# Patient Record
Sex: FEMALE | Race: WHITE | NOT HISPANIC OR LATINO | Employment: FULL TIME | URBAN - NONMETROPOLITAN AREA
[De-identification: names, ages, dates, MRNs, and addresses within clinical notes are randomized per-mention and may not be internally consistent; named-entity substitution may affect disease eponyms.]

---

## 2023-03-19 PROBLEM — R05.8 COUGH WITH SPUTUM: Status: ACTIVE | Noted: 2023-03-19

## 2023-03-19 PROBLEM — J45.909 ASTHMA (HHS-HCC): Status: ACTIVE | Noted: 2023-03-19

## 2023-03-19 PROBLEM — I82.409 DVT (DEEP VENOUS THROMBOSIS) (MULTI): Status: ACTIVE | Noted: 2023-03-19

## 2023-03-19 PROBLEM — D68.69 SECONDARY HYPERCOAGULABLE STATE (MULTI): Status: ACTIVE | Noted: 2023-03-19

## 2023-03-19 PROBLEM — S25.409A: Status: ACTIVE | Noted: 2023-03-19

## 2023-03-19 PROBLEM — K60.2 ANAL FISSURE: Status: ACTIVE | Noted: 2023-03-19

## 2023-03-19 PROBLEM — I26.99 PULMONARY EMBOLISM (MULTI): Status: ACTIVE | Noted: 2023-03-19

## 2023-03-19 PROBLEM — G43.109 COMPLICATED MIGRAINE: Status: ACTIVE | Noted: 2023-03-19

## 2023-03-19 PROBLEM — K62.5 BRBPR (BRIGHT RED BLOOD PER RECTUM): Status: ACTIVE | Noted: 2023-03-19

## 2023-03-19 PROBLEM — M79.609 LIMB PAIN: Status: ACTIVE | Noted: 2023-03-19

## 2023-03-19 PROBLEM — M25.519 SHOULDER PAIN: Status: ACTIVE | Noted: 2023-03-19

## 2023-03-19 PROBLEM — E66.9 OBESITY: Status: ACTIVE | Noted: 2023-03-19

## 2023-03-19 PROBLEM — I27.24 CTEPH (CHRONIC THROMBOEMBOLIC PULMONARY HYPERTENSION) (MULTI): Status: ACTIVE | Noted: 2023-03-19

## 2023-03-19 PROBLEM — J30.2 SEASONAL ALLERGIC REACTION: Status: ACTIVE | Noted: 2023-03-19

## 2023-03-19 PROBLEM — G70.00 MYASTHENIA GRAVIS, ACETYLCHOLINE RECEPTOR ANTIBODY POSITIVE (MULTI): Status: ACTIVE | Noted: 2023-03-19

## 2023-03-19 PROBLEM — R06.02 EXERTIONAL SHORTNESS OF BREATH: Status: ACTIVE | Noted: 2023-03-19

## 2023-03-19 PROBLEM — J06.9 URI (UPPER RESPIRATORY INFECTION): Status: ACTIVE | Noted: 2023-03-19

## 2023-03-19 RX ORDER — MYCOPHENOLATE MOFETIL 500 MG/1
500 TABLET ORAL 2 TIMES DAILY
COMMUNITY

## 2023-03-19 RX ORDER — TADALAFIL 20 MG/1
2 TABLET ORAL DAILY
COMMUNITY
Start: 2019-06-01 | End: 2024-03-13

## 2023-03-19 RX ORDER — BENZONATATE 100 MG/1
100 CAPSULE ORAL 3 TIMES DAILY PRN
COMMUNITY
End: 2023-03-24 | Stop reason: ALTCHOICE

## 2023-03-19 RX ORDER — DOCUSATE SODIUM 100 MG/1
1 CAPSULE, LIQUID FILLED ORAL 2 TIMES DAILY
Status: ON HOLD | COMMUNITY
End: 2024-03-28 | Stop reason: WASHOUT

## 2023-03-19 RX ORDER — FLUTICASONE PROPIONATE 50 MCG
1 SPRAY, SUSPENSION (ML) NASAL ONCE AS NEEDED
COMMUNITY
Start: 2014-09-22

## 2023-03-19 RX ORDER — MONTELUKAST SODIUM 10 MG/1
10 TABLET ORAL NIGHTLY
COMMUNITY

## 2023-03-19 RX ORDER — BUDESONIDE AND FORMOTEROL FUMARATE DIHYDRATE 80; 4.5 UG/1; UG/1
AEROSOL RESPIRATORY (INHALATION)
COMMUNITY
Start: 2022-04-27 | End: 2023-12-21 | Stop reason: ALTCHOICE

## 2023-03-19 RX ORDER — PYRIDOSTIGMINE BROMIDE 60 MG/1
2 TABLET ORAL DAILY
COMMUNITY
Start: 2013-01-03

## 2023-03-19 RX ORDER — LORATADINE 10 MG/1
1 TABLET ORAL DAILY
COMMUNITY
Start: 2014-07-15 | End: 2023-11-08 | Stop reason: ALTCHOICE

## 2023-03-19 RX ORDER — ACETAMINOPHEN 325 MG/1
TABLET ORAL
COMMUNITY

## 2023-03-19 RX ORDER — MACITENTAN 10 MG/1
1 TABLET, FILM COATED ORAL DAILY
COMMUNITY
Start: 2019-11-30

## 2023-03-19 RX ORDER — ALBUTEROL SULFATE 90 UG/1
1 AEROSOL, METERED RESPIRATORY (INHALATION) EVERY 4 HOURS PRN
COMMUNITY
End: 2023-12-30

## 2023-03-24 ENCOUNTER — OFFICE VISIT (OUTPATIENT)
Dept: PRIMARY CARE | Facility: CLINIC | Age: 38
End: 2023-03-24
Payer: COMMERCIAL

## 2023-03-24 VITALS
HEIGHT: 63 IN | DIASTOLIC BLOOD PRESSURE: 79 MMHG | WEIGHT: 211 LBS | HEART RATE: 77 BPM | SYSTOLIC BLOOD PRESSURE: 128 MMHG | BODY MASS INDEX: 37.39 KG/M2

## 2023-03-24 DIAGNOSIS — I27.82 CHRONIC PULMONARY EMBOLISM, UNSPECIFIED PULMONARY EMBOLISM TYPE, UNSPECIFIED WHETHER ACUTE COR PULMONALE PRESENT (MULTI): ICD-10-CM

## 2023-03-24 DIAGNOSIS — J30.2 SEASONAL ALLERGIC REACTION: ICD-10-CM

## 2023-03-24 DIAGNOSIS — G43.109 COMPLICATED MIGRAINE: Primary | ICD-10-CM

## 2023-03-24 DIAGNOSIS — G70.00 MYASTHENIA GRAVIS, ACETYLCHOLINE RECEPTOR ANTIBODY POSITIVE (MULTI): ICD-10-CM

## 2023-03-24 DIAGNOSIS — G43.709 CHRONIC MIGRAINE WITHOUT AURA WITHOUT STATUS MIGRAINOSUS, NOT INTRACTABLE: ICD-10-CM

## 2023-03-24 DIAGNOSIS — I27.24 CTEPH (CHRONIC THROMBOEMBOLIC PULMONARY HYPERTENSION) (MULTI): ICD-10-CM

## 2023-03-24 DIAGNOSIS — L23.9 ALLERGIC ECZEMA: ICD-10-CM

## 2023-03-24 PROBLEM — H52.03 HYPEROPIA OF BOTH EYES: Status: RESOLVED | Noted: 2021-12-30 | Resolved: 2023-03-24

## 2023-03-24 PROBLEM — H43.813 POSTERIOR VITREOUS DETACHMENT OF BOTH EYES: Status: RESOLVED | Noted: 2019-09-17 | Resolved: 2023-03-24

## 2023-03-24 PROBLEM — F41.9 ANXIETY DISORDER: Status: ACTIVE | Noted: 2020-04-29

## 2023-03-24 PROBLEM — H43.393 VITREOUS FLOATER, BILATERAL: Status: RESOLVED | Noted: 2019-09-17 | Resolved: 2023-03-24

## 2023-03-24 PROCEDURE — 99214 OFFICE O/P EST MOD 30 MIN: CPT | Performed by: INTERNAL MEDICINE

## 2023-03-24 PROCEDURE — 1036F TOBACCO NON-USER: CPT | Performed by: INTERNAL MEDICINE

## 2023-03-24 RX ORDER — MINERAL OIL
180 ENEMA (ML) RECTAL DAILY
Qty: 30 TABLET | Refills: 5 | Status: SHIPPED | OUTPATIENT
Start: 2023-03-24 | End: 2023-11-08

## 2023-03-24 RX ORDER — MULTIVITAMIN
2 TABLET ORAL
COMMUNITY

## 2023-03-24 RX ORDER — BETAMETHASONE DIPROPIONATE 0.5 MG/G
CREAM TOPICAL 2 TIMES DAILY PRN
Qty: 30 G | Refills: 1 | Status: SHIPPED | OUTPATIENT
Start: 2023-03-24 | End: 2023-07-22

## 2023-03-24 ASSESSMENT — PATIENT HEALTH QUESTIONNAIRE - PHQ9
2. FEELING DOWN, DEPRESSED OR HOPELESS: NOT AT ALL
1. LITTLE INTEREST OR PLEASURE IN DOING THINGS: NOT AT ALL
SUM OF ALL RESPONSES TO PHQ9 QUESTIONS 1 AND 2: 0

## 2023-03-24 ASSESSMENT — ENCOUNTER SYMPTOMS
SINUS PAIN: 1
SINUS PRESSURE: 1
HEADACHES: 1
DIZZINESS: 1
NUMBNESS: 1

## 2023-03-24 NOTE — PROGRESS NOTES
"Subjective   Patient ID: Alda Chaney is a 38 y.o. female who presents for Migraine (Pt states she has had them for a long time/Getting worse and coming on more frequent), Allergies (Seasonal/Having a hard time with her lungs this year), and Rash (Back of her RT leg/Itchy/tender/Noticed a week ago).    Migraine   Associated symptoms include dizziness, numbness and sinus pressure.   Rash  Associated symptoms include congestion.     Patient reports that for  the last few months her migraines have been getting worse, they will affect her eye sight, everything will get blurry. No aura. Sometimes her lips will feel numb.   Usually getting it twice a week, often while driving but not necessarily because the sun is bright.   She had an eye exam in Jan, rx did not change significnatly.   She repots that it is throbbing off to the left side of her head.  She reports  that the stabbing sensation in the top of her head is new through she has had migraines in the past     Allergies dod not seem maribell controlled on current regiment, singualir at night, loratadine during the day, and flonase.     Rash on back of right knee, has been there for 1 week, now it is itchy.   Review of Systems   HENT:  Positive for congestion, sinus pressure and sinus pain.    Skin:  Positive for rash.   Neurological:  Positive for dizziness, numbness and headaches.       Objective   /79 (BP Location: Left arm, Patient Position: Sitting, BP Cuff Size: Adult)   Pulse 77   Ht 1.6 m (5' 3\")   Wt 95.7 kg (211 lb)   BMI 37.38 kg/m²     Physical Exam  Constitutional:       General: She is not in acute distress.     Appearance: Normal appearance.   HENT:      Head: Normocephalic.      Nose: Nose normal.      Mouth/Throat:      Mouth: Mucous membranes are dry.      Pharynx: No oropharyngeal exudate.   Eyes:      General:         Right eye: No discharge.         Left eye: No discharge.      Extraocular Movements: Extraocular movements intact.      " Pupils: Pupils are equal, round, and reactive to light.   Cardiovascular:      Rate and Rhythm: Normal rate and regular rhythm.      Heart sounds: No murmur heard.     No gallop.   Pulmonary:      Effort: Pulmonary effort is normal. No respiratory distress.      Breath sounds: Normal breath sounds. No wheezing.   Musculoskeletal:         General: No swelling. Normal range of motion.   Skin:     General: Skin is warm and dry.      Coloration: Skin is not jaundiced.      Comments: What appears to be nummular excema on posterior right knee, around 3 cm   Neurological:      General: No focal deficit present.      Mental Status: She is alert and oriented to person, place, and time.      Cranial Nerves: No cranial nerve deficit.   Psychiatric:         Mood and Affect: Mood normal.         Behavior: Behavior normal.           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)     - PE with chronic TEPH  - follows with pulm  - on xarelto   - on uptravi  - on opsumit         Pulmonary embolism (CMS/HCC)       Other    Complicated migraine     - change in headache pattern with new sharp stabbing pain at apex of head off to the left, will order brain mri wo contrast   - rx nurtec   - cannot have mg due to Myasthenia gravis   - recommend riboflavin  - had eye exam last in Jan 23         Seasonal allergic reaction     - will try changing cetirizine to allegra           Other Visit Diagnoses       Allergic eczema    -  Primary    Relevant Medications    fexofenadine (Allegra) 180 mg tablet    betamethasone dipropionate 0.05 % cream    Chronic migraine without aura without status migrainosus, not intractable        Relevant Medications    rimegepant (Nurtec ODT) 75 mg  tablet,disintegrating    Other Relevant Orders    MR brain intracranial wo IV contrast            Final diagnoses:   [L23.9] Allergic eczema   [G43.709] Chronic migraine without aura without status migrainosus, not intractable   [G70.00] Myasthenia gravis, acetylcholine receptor antibody positive (CMS/HCC)   [I27.24] CTEPH (chronic thromboembolic pulmonary hypertension) (CMS/[facility])   [I27.82] Chronic pulmonary embolism, unspecified pulmonary embolism type, unspecified whether acute cor pulmonale present (CMS/[facility])   [G43.109] Complicated migraine   [J30.2] Seasonal allergic reaction

## 2023-03-24 NOTE — ASSESSMENT & PLAN NOTE
- change in headache pattern with new sharp stabbing pain at apex of head off to the left, will order brain mri wo contrast   - rx nurtec   - cannot have mg due to Myasthenia gravis   - recommend riboflavin  - had eye exam last in Jan 23

## 2023-04-03 ENCOUNTER — APPOINTMENT (OUTPATIENT)
Dept: LAB | Facility: LAB | Age: 38
End: 2023-04-03
Payer: COMMERCIAL

## 2023-04-03 LAB
ALANINE AMINOTRANSFERASE (SGPT) (U/L) IN SER/PLAS: 14 U/L (ref 7–45)
ALBUMIN (G/DL) IN SER/PLAS: 4.1 G/DL (ref 3.4–5)
ALKALINE PHOSPHATASE (U/L) IN SER/PLAS: 82 U/L (ref 33–110)
ANION GAP IN SER/PLAS: 12 MMOL/L (ref 10–20)
ASPARTATE AMINOTRANSFERASE (SGOT) (U/L) IN SER/PLAS: 13 U/L (ref 9–39)
BASOPHILS (10*3/UL) IN BLOOD BY AUTOMATED COUNT: 0.04 X10E9/L (ref 0–0.1)
BASOPHILS/100 LEUKOCYTES IN BLOOD BY AUTOMATED COUNT: 0.6 % (ref 0–2)
BILIRUBIN TOTAL (MG/DL) IN SER/PLAS: 0.4 MG/DL (ref 0–1.2)
CALCIUM (MG/DL) IN SER/PLAS: 9.4 MG/DL (ref 8.6–10.6)
CARBON DIOXIDE, TOTAL (MMOL/L) IN SER/PLAS: 27 MMOL/L (ref 21–32)
CHLORIDE (MMOL/L) IN SER/PLAS: 105 MMOL/L (ref 98–107)
CREATININE (MG/DL) IN SER/PLAS: 0.63 MG/DL (ref 0.5–1.05)
EOSINOPHILS (10*3/UL) IN BLOOD BY AUTOMATED COUNT: 0.22 X10E9/L (ref 0–0.7)
EOSINOPHILS/100 LEUKOCYTES IN BLOOD BY AUTOMATED COUNT: 3 % (ref 0–6)
ERYTHROCYTE DISTRIBUTION WIDTH (RATIO) BY AUTOMATED COUNT: 13.3 % (ref 11.5–14.5)
ERYTHROCYTE MEAN CORPUSCULAR HEMOGLOBIN CONCENTRATION (G/DL) BY AUTOMATED: 32.2 G/DL (ref 32–36)
ERYTHROCYTE MEAN CORPUSCULAR VOLUME (FL) BY AUTOMATED COUNT: 93 FL (ref 80–100)
ERYTHROCYTES (10*6/UL) IN BLOOD BY AUTOMATED COUNT: 4.7 X10E12/L (ref 4–5.2)
GFR FEMALE: >90 ML/MIN/1.73M2
GLUCOSE (MG/DL) IN SER/PLAS: 90 MG/DL (ref 74–99)
HCG, URINE: NEGATIVE
HEMATOCRIT (%) IN BLOOD BY AUTOMATED COUNT: 43.8 % (ref 36–46)
HEMOGLOBIN (G/DL) IN BLOOD: 14.1 G/DL (ref 12–16)
IMMATURE GRANULOCYTES/100 LEUKOCYTES IN BLOOD BY AUTOMATED COUNT: 0.4 % (ref 0–0.9)
LEUKOCYTES (10*3/UL) IN BLOOD BY AUTOMATED COUNT: 7.3 X10E9/L (ref 4.4–11.3)
LYMPHOCYTES (10*3/UL) IN BLOOD BY AUTOMATED COUNT: 1.68 X10E9/L (ref 1.2–4.8)
LYMPHOCYTES/100 LEUKOCYTES IN BLOOD BY AUTOMATED COUNT: 23.1 % (ref 13–44)
MONOCYTES (10*3/UL) IN BLOOD BY AUTOMATED COUNT: 0.45 X10E9/L (ref 0.1–1)
MONOCYTES/100 LEUKOCYTES IN BLOOD BY AUTOMATED COUNT: 6.2 % (ref 2–10)
NATRIURETIC PEPTIDE B (PG/ML) IN SER/PLAS: 11 PG/ML (ref 0–99)
NEUTROPHILS (10*3/UL) IN BLOOD BY AUTOMATED COUNT: 4.85 X10E9/L (ref 1.2–7.7)
NEUTROPHILS/100 LEUKOCYTES IN BLOOD BY AUTOMATED COUNT: 66.7 % (ref 40–80)
NRBC (PER 100 WBCS) BY AUTOMATED COUNT: 0 /100 WBC (ref 0–0)
PLATELETS (10*3/UL) IN BLOOD AUTOMATED COUNT: 289 X10E9/L (ref 150–450)
POTASSIUM (MMOL/L) IN SER/PLAS: 4.2 MMOL/L (ref 3.5–5.3)
PROTEIN TOTAL: 6.8 G/DL (ref 6.4–8.2)
SODIUM (MMOL/L) IN SER/PLAS: 140 MMOL/L (ref 136–145)
UREA NITROGEN (MG/DL) IN SER/PLAS: 14 MG/DL (ref 6–23)

## 2023-04-25 ENCOUNTER — APPOINTMENT (OUTPATIENT)
Dept: PRIMARY CARE | Facility: CLINIC | Age: 38
End: 2023-04-25
Payer: COMMERCIAL

## 2023-04-26 ENCOUNTER — HOSPITAL ENCOUNTER (OUTPATIENT)
Dept: DATA CONVERSION | Facility: HOSPITAL | Age: 38
End: 2023-04-26
Attending: SURGERY | Admitting: SURGERY
Payer: COMMERCIAL

## 2023-04-26 DIAGNOSIS — R06.09 OTHER FORMS OF DYSPNEA: ICD-10-CM

## 2023-04-26 DIAGNOSIS — G70.00 MYASTHENIA GRAVIS WITHOUT (ACUTE) EXACERBATION (MULTI): ICD-10-CM

## 2023-04-26 DIAGNOSIS — Z99.81 DEPENDENCE ON SUPPLEMENTAL OXYGEN: ICD-10-CM

## 2023-04-26 DIAGNOSIS — E66.9 OBESITY, UNSPECIFIED: ICD-10-CM

## 2023-04-26 DIAGNOSIS — G43.909 MIGRAINE, UNSPECIFIED, NOT INTRACTABLE, WITHOUT STATUS MIGRAINOSUS: ICD-10-CM

## 2023-04-26 DIAGNOSIS — K64.8 OTHER HEMORRHOIDS: ICD-10-CM

## 2023-04-26 DIAGNOSIS — I27.24 CHRONIC THROMBOEMBOLIC PULMONARY HYPERTENSION (MULTI): ICD-10-CM

## 2023-04-26 DIAGNOSIS — Z86.718 PERSONAL HISTORY OF OTHER VENOUS THROMBOSIS AND EMBOLISM: ICD-10-CM

## 2023-04-26 DIAGNOSIS — J45.909 UNSPECIFIED ASTHMA, UNCOMPLICATED (HHS-HCC): ICD-10-CM

## 2023-04-26 DIAGNOSIS — K62.5 HEMORRHAGE OF ANUS AND RECTUM: ICD-10-CM

## 2023-04-26 DIAGNOSIS — I27.21 SECONDARY PULMONARY ARTERIAL HYPERTENSION (MULTI): ICD-10-CM

## 2023-04-26 DIAGNOSIS — Z79.01 LONG TERM (CURRENT) USE OF ANTICOAGULANTS: ICD-10-CM

## 2023-04-26 DIAGNOSIS — K64.4 RESIDUAL HEMORRHOIDAL SKIN TAGS: ICD-10-CM

## 2023-04-26 DIAGNOSIS — K57.30 DIVERTICULOSIS OF LARGE INTESTINE WITHOUT PERFORATION OR ABSCESS WITHOUT BLEEDING: ICD-10-CM

## 2023-04-26 DIAGNOSIS — Z86.711 PERSONAL HISTORY OF PULMONARY EMBOLISM: ICD-10-CM

## 2023-05-05 ENCOUNTER — OFFICE VISIT (OUTPATIENT)
Dept: PRIMARY CARE | Facility: CLINIC | Age: 38
End: 2023-05-05
Payer: COMMERCIAL

## 2023-05-05 VITALS
WEIGHT: 214 LBS | SYSTOLIC BLOOD PRESSURE: 127 MMHG | BODY MASS INDEX: 37.92 KG/M2 | HEIGHT: 63 IN | DIASTOLIC BLOOD PRESSURE: 75 MMHG | HEART RATE: 88 BPM

## 2023-05-05 DIAGNOSIS — G43.109 COMPLICATED MIGRAINE: Primary | ICD-10-CM

## 2023-05-05 DIAGNOSIS — G43.709 CHRONIC MIGRAINE WITHOUT AURA WITHOUT STATUS MIGRAINOSUS, NOT INTRACTABLE: ICD-10-CM

## 2023-05-05 LAB — HCG, URINE: NEGATIVE

## 2023-05-05 PROCEDURE — 1036F TOBACCO NON-USER: CPT | Performed by: INTERNAL MEDICINE

## 2023-05-05 PROCEDURE — 3008F BODY MASS INDEX DOCD: CPT | Performed by: INTERNAL MEDICINE

## 2023-05-05 PROCEDURE — 99214 OFFICE O/P EST MOD 30 MIN: CPT | Performed by: INTERNAL MEDICINE

## 2023-05-05 ASSESSMENT — PATIENT HEALTH QUESTIONNAIRE - PHQ9
SUM OF ALL RESPONSES TO PHQ9 QUESTIONS 1 AND 2: 0
2. FEELING DOWN, DEPRESSED OR HOPELESS: NOT AT ALL
1. LITTLE INTEREST OR PLEASURE IN DOING THINGS: NOT AT ALL

## 2023-05-05 NOTE — PROGRESS NOTES
"Subjective   Patient ID: Alda Carvalho is a 38 y.o. female who presents for Follow-up (1 month (headaches and allergies) ).  HPI  Patient is here today for 1 mo follow up on migraines.  Reports since starting the Allegra her allergies have been improved.  Insurance not giving her enough to do nurtec every other day for preventative indication, will change directions, was having migraine nearly daily, > 15 per month, sig improved since starting medication,.     Review of Systems    Objective   /75 (BP Location: Right arm, Patient Position: Sitting, BP Cuff Size: Adult)   Pulse 88   Ht 1.6 m (5' 3\")   Wt 97.1 kg (214 lb)   BMI 37.91 kg/m²     Physical Exam    Narrative & Impression   Interpreted By:  JOSEP MCMANUS DO  MRN: [id number]  Patient Name: ALDA CARVALHO     STUDY:  MRI BRAIN WO;  3/29/2023 8:03 am     INDICATION:  change in migraines.     COMPARISON:  April 16, 2014     ACCESSION NUMBER(S):  [id number]     ORDERING CLINICIAN:  BAKARI VELAZQUEZ     TECHNIQUE:  Axial T2, FLAIR, DWI, gradient echo T2 and sagittal and coronal T1  weighted images of brain were acquired.     FINDINGS:  Some of the images are degraded by patient motion.     CSF Spaces: The ventricular system is nondilated. There is again  prominence of the retrocerebellar CSF space which may be related to  an arachnoid cyst measuring 1.8 cm in AP dimension by 1.9 cm in  transverse dimension, not measurably changed from the prior study.     Parenchyma: There is no diffusion restriction abnormality to suggest  acute infarct.  There is no focal parenchymal signal abnormality.  There is no mass effect or midline shift.     Paranasal Sinuses and Mastoids: Visualized paranasal sinuses and  mastoid air cells are unremarkable.     IMPRESSION:  No evidence of acute infarct, intracranial mass effect or midline  shift.     Assessment/Plan   Problem List Items Addressed This Visit          Other    Complicated migraine - Primary     Other Visit " Diagnoses       Chronic migraine without aura without status migrainosus, not intractable        Relevant Medications    rimegepant (Nurtec ODT) 75 mg tablet,disintegrating          Chronic migraine   - I have personally reviewed pts brain MRI, within normal limtis   - has not had any headaches since starting nurtec   - will change directions to 1 every other day for preventative indication   - cannot have mg due to Myasthenia gravis   - follow up in 6 mo sooner if anything changes     Final diagnoses:   [G43.709] Chronic migraine without aura without status migrainosus, not intractable   [G43.109] Complicated migraine

## 2023-07-19 ENCOUNTER — TELEPHONE (OUTPATIENT)
Dept: PRIMARY CARE | Facility: CLINIC | Age: 38
End: 2023-07-19
Payer: COMMERCIAL

## 2023-07-19 DIAGNOSIS — M25.571 ACUTE RIGHT ANKLE PAIN: Primary | ICD-10-CM

## 2023-07-19 LAB — HCG, URINE: NEGATIVE

## 2023-07-19 NOTE — TELEPHONE ENCOUNTER
Pt states she twisted her Right ankle a week ago and still hurting a lot, wanted to know if you would order an xray for her

## 2023-09-07 VITALS — HEIGHT: 63 IN | WEIGHT: 212.52 LBS | BODY MASS INDEX: 37.66 KG/M2

## 2023-09-14 NOTE — H&P
History of Present Illness:   Pregnant/Lactating:  ·  Are You Pregnant no   ·  Are You Currently Breastfeeding no     History Present Illness:  Reason for surgery: BRBPR   HPI:    Ms. Chaney is a 37-year-old female who I previously evaluated 2 months ago for an anal fissure. At that time, she was having sharp pain. That sharp  severe pain has subsided. She still gets some minor discomfort with bowel movements, but this is much improved. She has been taking Colace twice daily. She has bowel movements every other day. These are soft and do not require straining. Since starting  the Colace, she has not seen any blood per rectum. She takes Xarelto for history of DVT and PE, she has a clotting disorder. She has required a Lovenox bridge in the past when these are held for procedures. She had a colonoscopy over 10 years ago that  showed hemorrhoids, but no other abnormalities. She has no family history of colon or rectal cancer.        Allergies:        Allergies:  ·  Levaquin : Other  ·  magnesium  sulfate: Resp Distress  ·  Bee  Stings: Anaphylaxis  ·  magnesium  chloride: Unknown  ·  Cipro : Unknown    Home Medication Review:   Home Medications Reviewed: yes     Impression/Procedure:   ·  Impression and Planned Procedure: Ms. Chaney is a 37-year-old female with an anal fissure. This is healing, but not fully healed. Therefore, we will delay colonoscopy a little longer. We are doing  the colonoscopy because she had seen bright red blood per rectum and it has been over 10 years since her previous scope. We discussed risks and benefit of diagnostic colonoscopy. This included risk of bleeding, perforation, missed polyps, potential need  for additional procedures pending findings, and potential recurrence of her anal fissure. She was agreeable to proceed. Bowel prep instructions were reviewed with the patient and all of her questions were answered. We will do this at the hospital with  the assistance of anesthesia  given her history of pulmonary arterial hypertension. She reports that when she had Versed in the past she had respiratory issues that required reversal of the Versed. She will also need to hold her Xarelto for this procedure,  and will require a Lovenox bridge. She is scheduled for diagnostic colonoscopy on 4/26/23. In the meantime, she will continue Colace twice daily and she will notify the office if her fissure does not continue to improved prior to the procedure in which  case we would bump it back.       ERAS (Enhanced Recovery After Surgery):  ·  ERAS Patient: no       Physical Exam by System:    Constitutional: No acute distress, conversant and  pleasant   Eyes: PERRL   ENMT: mucous membranes moist   Head/Neck: Grossly normal.   Respiratory/Thorax: No labored breathing   Cardiovascular: NSR   Gastrointestinal: Abdomen soft, obese but nondistended   Extremities: normal extremities, no edema   Neurological: alert and oriented x3   Lymphatic: No significant lymphadenopathy   Psychological: Appropriate mood and behavior   Skin: Warm and dry     Consent:   COVID-19 Consent:  ·  COVID-19 Risk Consent Surgeon has reviewed key risks related to the risk of alena COVID-19 and if they contract COVID-19 what the risks are.       Electronic Signatures:  Milagros Almaraz)  (Signed 26-Apr-2023 07:06)   Authored: History of Present Illness, Allergies, Home  Medication Review, Impression/Procedure, ERAS, Physical Exam, Consent, Note Completion      Last Updated: 26-Apr-2023 07:06 by Milagros Almaraz)

## 2023-09-25 ENCOUNTER — LAB (OUTPATIENT)
Dept: LAB | Facility: LAB | Age: 38
End: 2023-09-25
Payer: COMMERCIAL

## 2023-09-25 LAB
ALANINE AMINOTRANSFERASE (SGPT) (U/L) IN SER/PLAS: 25 U/L (ref 7–45)
ALBUMIN (G/DL) IN SER/PLAS: 4.1 G/DL (ref 3.4–5)
ALKALINE PHOSPHATASE (U/L) IN SER/PLAS: 79 U/L (ref 33–110)
ANION GAP IN SER/PLAS: 16 MMOL/L (ref 10–20)
ASPARTATE AMINOTRANSFERASE (SGOT) (U/L) IN SER/PLAS: 24 U/L (ref 9–39)
BILIRUBIN TOTAL (MG/DL) IN SER/PLAS: 0.5 MG/DL (ref 0–1.2)
CALCIUM (MG/DL) IN SER/PLAS: 9.3 MG/DL (ref 8.6–10.6)
CARBON DIOXIDE, TOTAL (MMOL/L) IN SER/PLAS: 24 MMOL/L (ref 21–32)
CHLORIDE (MMOL/L) IN SER/PLAS: 106 MMOL/L (ref 98–107)
CREATININE (MG/DL) IN SER/PLAS: 0.64 MG/DL (ref 0.5–1.05)
ERYTHROCYTE DISTRIBUTION WIDTH (RATIO) BY AUTOMATED COUNT: 13.2 % (ref 11.5–14.5)
ERYTHROCYTE MEAN CORPUSCULAR HEMOGLOBIN CONCENTRATION (G/DL) BY AUTOMATED: 33.2 G/DL (ref 32–36)
ERYTHROCYTE MEAN CORPUSCULAR VOLUME (FL) BY AUTOMATED COUNT: 93 FL (ref 80–100)
ERYTHROCYTES (10*6/UL) IN BLOOD BY AUTOMATED COUNT: 4.35 X10E12/L (ref 4–5.2)
GFR FEMALE: >90 ML/MIN/1.73M2
GLUCOSE (MG/DL) IN SER/PLAS: 88 MG/DL (ref 74–99)
HEMATOCRIT (%) IN BLOOD BY AUTOMATED COUNT: 40.4 % (ref 36–46)
HEMOGLOBIN (G/DL) IN BLOOD: 13.4 G/DL (ref 12–16)
LEUKOCYTES (10*3/UL) IN BLOOD BY AUTOMATED COUNT: 4.6 X10E9/L (ref 4.4–11.3)
NATRIURETIC PEPTIDE B (PG/ML) IN SER/PLAS: 20 PG/ML (ref 0–99)
NRBC (PER 100 WBCS) BY AUTOMATED COUNT: 0 /100 WBC (ref 0–0)
PLATELETS (10*3/UL) IN BLOOD AUTOMATED COUNT: 300 X10E9/L (ref 150–450)
POTASSIUM (MMOL/L) IN SER/PLAS: 4.7 MMOL/L (ref 3.5–5.3)
PROTEIN TOTAL: 6.7 G/DL (ref 6.4–8.2)
SODIUM (MMOL/L) IN SER/PLAS: 141 MMOL/L (ref 136–145)
UREA NITROGEN (MG/DL) IN SER/PLAS: 11 MG/DL (ref 6–23)

## 2023-09-27 RX ORDER — BETAMETHASONE DIPROPIONATE 0.5 MG/G
1 CREAM TOPICAL 2 TIMES DAILY PRN
Status: ON HOLD | COMMUNITY
Start: 2023-03-24 | End: 2024-03-28 | Stop reason: WASHOUT

## 2023-10-29 DIAGNOSIS — J45.30 MILD PERSISTENT ASTHMA WITHOUT COMPLICATION (HHS-HCC): Primary | ICD-10-CM

## 2023-11-08 ENCOUNTER — HOSPITAL ENCOUNTER (OUTPATIENT)
Dept: VASCULAR MEDICINE | Facility: HOSPITAL | Age: 38
Discharge: HOME | End: 2023-11-08
Payer: COMMERCIAL

## 2023-11-08 ENCOUNTER — OFFICE VISIT (OUTPATIENT)
Dept: PRIMARY CARE | Facility: CLINIC | Age: 38
End: 2023-11-08
Payer: COMMERCIAL

## 2023-11-08 ENCOUNTER — LAB (OUTPATIENT)
Dept: LAB | Facility: LAB | Age: 38
End: 2023-11-08
Payer: COMMERCIAL

## 2023-11-08 VITALS
HEIGHT: 63 IN | HEART RATE: 78 BPM | SYSTOLIC BLOOD PRESSURE: 125 MMHG | DIASTOLIC BLOOD PRESSURE: 77 MMHG | WEIGHT: 211 LBS | BODY MASS INDEX: 37.39 KG/M2

## 2023-11-08 DIAGNOSIS — I27.82 CHRONIC PULMONARY EMBOLISM, UNSPECIFIED PULMONARY EMBOLISM TYPE, UNSPECIFIED WHETHER ACUTE COR PULMONALE PRESENT (MULTI): ICD-10-CM

## 2023-11-08 DIAGNOSIS — Z86.718 HISTORY OF DVT (DEEP VEIN THROMBOSIS): ICD-10-CM

## 2023-11-08 DIAGNOSIS — Z79.899 OTHER LONG TERM (CURRENT) DRUG THERAPY: Primary | ICD-10-CM

## 2023-11-08 DIAGNOSIS — Z23 NEED FOR INFLUENZA VACCINATION: ICD-10-CM

## 2023-11-08 DIAGNOSIS — I26.99 PULMONARY EMBOLISM AND INFARCTION (MULTI): ICD-10-CM

## 2023-11-08 DIAGNOSIS — M79.604 PAIN OF RIGHT LOWER EXTREMITY: ICD-10-CM

## 2023-11-08 DIAGNOSIS — G70.00 MYASTHENIA GRAVIS, ACETYLCHOLINE RECEPTOR ANTIBODY POSITIVE (MULTI): ICD-10-CM

## 2023-11-08 DIAGNOSIS — I82.591 CHRONIC EMBOLISM AND THROMBOSIS OF OTHER SPECIFIED DEEP VEIN OF RIGHT LOWER EXTREMITY (MULTI): ICD-10-CM

## 2023-11-08 DIAGNOSIS — L23.9 ALLERGIC ECZEMA: ICD-10-CM

## 2023-11-08 DIAGNOSIS — F41.9 ANXIETY DISORDER, UNSPECIFIED TYPE: ICD-10-CM

## 2023-11-08 DIAGNOSIS — I27.24 CTEPH (CHRONIC THROMBOEMBOLIC PULMONARY HYPERTENSION) (MULTI): Primary | ICD-10-CM

## 2023-11-08 LAB — HCG UR QL IA.RAPID: NEGATIVE

## 2023-11-08 PROCEDURE — 3008F BODY MASS INDEX DOCD: CPT | Performed by: INTERNAL MEDICINE

## 2023-11-08 PROCEDURE — 93971 EXTREMITY STUDY: CPT

## 2023-11-08 PROCEDURE — 93971 EXTREMITY STUDY: CPT | Performed by: STUDENT IN AN ORGANIZED HEALTH CARE EDUCATION/TRAINING PROGRAM

## 2023-11-08 PROCEDURE — 99214 OFFICE O/P EST MOD 30 MIN: CPT | Performed by: INTERNAL MEDICINE

## 2023-11-08 PROCEDURE — 90686 IIV4 VACC NO PRSV 0.5 ML IM: CPT | Performed by: INTERNAL MEDICINE

## 2023-11-08 PROCEDURE — 81025 URINE PREGNANCY TEST: CPT

## 2023-11-08 PROCEDURE — 1036F TOBACCO NON-USER: CPT | Performed by: INTERNAL MEDICINE

## 2023-11-08 PROCEDURE — 90471 IMMUNIZATION ADMIN: CPT | Performed by: INTERNAL MEDICINE

## 2023-11-08 RX ORDER — ALBUTEROL SULFATE 90 UG/1
1 AEROSOL, METERED RESPIRATORY (INHALATION) EVERY 4 HOURS PRN
Refills: 11 | OUTPATIENT
Start: 2023-11-08

## 2023-11-08 RX ORDER — MINERAL OIL
180 ENEMA (ML) RECTAL DAILY
Qty: 90 TABLET | Refills: 1 | Status: SHIPPED | OUTPATIENT
Start: 2023-11-08 | End: 2024-11-07

## 2023-11-08 ASSESSMENT — ENCOUNTER SYMPTOMS
ACTIVITY CHANGE: 0
SORE THROAT: 0
SINUS PAIN: 0
BACK PAIN: 0
WHEEZING: 0
APPETITE CHANGE: 0
SHORTNESS OF BREATH: 0
NAUSEA: 0
COUGH: 0
WEAKNESS: 0
CHILLS: 0
ABDOMINAL DISTENTION: 0
FATIGUE: 0
VOMITING: 0
SINUS PRESSURE: 0
NUMBNESS: 0
DIARRHEA: 0

## 2023-11-08 NOTE — PROGRESS NOTES
"Subjective   Patient ID: Alda Chaney is a 38 y.o. female who presents for Follow-up (6 month).  HPI  Patient is here today for 6 mo follow up   Patient reports that she had pain in her right leg yesterday ,was on her feet for 11 hours at work, she has a hx of dvt on there right, she had some ankle swelling, put on compression hose, then had the pain in upper thigh.  PT is more active than she has been, Denies chest pain or sob.     Review of Systems   Constitutional:  Negative for activity change, appetite change, chills and fatigue.   HENT:  Negative for congestion, postnasal drip, sinus pressure, sinus pain and sore throat.    Respiratory:  Negative for cough, shortness of breath and wheezing.    Cardiovascular:  Positive for leg swelling. Negative for chest pain.   Gastrointestinal:  Negative for abdominal distention, diarrhea, nausea and vomiting.   Musculoskeletal:  Negative for back pain.   Neurological:  Negative for weakness and numbness.       Objective   /77   Pulse 78   Ht 1.6 m (5' 3\")   Wt 95.7 kg (211 lb)   BMI 37.38 kg/m²     Physical Exam  Constitutional:       General: She is not in acute distress.     Appearance: Normal appearance.   HENT:      Head: Normocephalic.      Nose: Nose normal.      Mouth/Throat:      Pharynx: No oropharyngeal exudate.   Eyes:      General:         Right eye: No discharge.         Left eye: No discharge.      Extraocular Movements: Extraocular movements intact.      Pupils: Pupils are equal, round, and reactive to light.   Cardiovascular:      Rate and Rhythm: Normal rate and regular rhythm.      Heart sounds: No murmur heard.     No gallop.   Pulmonary:      Effort: Pulmonary effort is normal. No respiratory distress.      Breath sounds: Normal breath sounds. No wheezing.   Musculoskeletal:         General: No swelling. Normal range of motion.   Skin:     General: Skin is warm and dry.      Coloration: Skin is not jaundiced.   Neurological:      General: No " focal deficit present.      Mental Status: She is alert and oriented to person, place, and time.      Cranial Nerves: No cranial nerve deficit.   Psychiatric:         Mood and Affect: Mood normal.         Behavior: Behavior normal.       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 CTEPH   - PE with chronic teph   - follows with pulm   - on xarelto   - on uptravi   - on opsumit     3. Chronic migraine   - cannot have mg due to MG   - continue riboflavin   - last eye exam Jan 23   - has not needed to take nurtec in a while    4. Seasonal allergies   - claritin or zyrtec does not help, only allegra, sent rx     5. Leg pain and swelling, will order duplex     Final diagnoses:   [L23.9] Allergic eczema   [I27.24] CTEPH (chronic thromboembolic pulmonary hypertension) (CMS/HCC)   [I26.99] Pulmonary embolism and infarction (CMS/HCC)   [I27.82] Chronic pulmonary embolism, unspecified pulmonary embolism type, unspecified whether acute cor pulmonale present (CMS/HCC)   [Z86.718] History of DVT (deep vein thrombosis)   [F41.9] Anxiety disorder, unspecified type   [G70.00] Myasthenia gravis, acetylcholine receptor antibody positive (CMS/HCC)

## 2023-12-21 ENCOUNTER — OFFICE VISIT (OUTPATIENT)
Dept: PRIMARY CARE | Facility: CLINIC | Age: 38
End: 2023-12-21
Payer: COMMERCIAL

## 2023-12-21 VITALS
BODY MASS INDEX: 36.68 KG/M2 | DIASTOLIC BLOOD PRESSURE: 79 MMHG | HEART RATE: 91 BPM | HEIGHT: 63 IN | WEIGHT: 207 LBS | SYSTOLIC BLOOD PRESSURE: 119 MMHG

## 2023-12-21 DIAGNOSIS — J45.40 MODERATE PERSISTENT ASTHMA, UNSPECIFIED WHETHER COMPLICATED (HHS-HCC): Primary | ICD-10-CM

## 2023-12-21 PROCEDURE — 1036F TOBACCO NON-USER: CPT | Performed by: INTERNAL MEDICINE

## 2023-12-21 PROCEDURE — 3008F BODY MASS INDEX DOCD: CPT | Performed by: INTERNAL MEDICINE

## 2023-12-21 PROCEDURE — 99213 OFFICE O/P EST LOW 20 MIN: CPT | Performed by: INTERNAL MEDICINE

## 2023-12-21 RX ORDER — BUDESONIDE AND FORMOTEROL FUMARATE DIHYDRATE 160; 4.5 UG/1; UG/1
2 AEROSOL RESPIRATORY (INHALATION)
Qty: 10.2 G | Refills: 2 | Status: SHIPPED | OUTPATIENT
Start: 2023-12-21

## 2023-12-21 ASSESSMENT — ENCOUNTER SYMPTOMS
ACTIVITY CHANGE: 0
COUGH: 1

## 2023-12-21 NOTE — PROGRESS NOTES
"Subjective   Patient ID: Alda Chaney is a 38 y.o. female who presents for Cough (Pt reports having a cough for weeks/Tx with all allergy medication OTC/ Tessalon pearls etc.).  Cough      Patient is here today for cough,   PT reports that has had a cough for 2-3 weeks.   She tried tessalon pearls that she had at home that did not help, otc allergy medicine, and otc cough medicine .  She does have clear mucus production.  No fevers or sick contacts that she knows of.   She is still taking the Singulair also.   Her MG and CTPH have all been controlled.     Review of Systems   Constitutional:  Negative for activity change.   HENT:  Negative for congestion.    Respiratory:  Positive for cough.        Objective   /79   Pulse 91   Ht 1.6 m (5' 3\")   Wt 93.9 kg (207 lb)   BMI 36.67 kg/m²     Physical Exam  Constitutional:       General: She is not in acute distress.     Appearance: Normal appearance. She is not toxic-appearing.   HENT:      Head: Normocephalic and atraumatic.      Right Ear: Tympanic membrane, ear canal and external ear normal.      Left Ear: Tympanic membrane, ear canal and external ear normal.      Nose:      Comments: +pale and boggy      Mouth/Throat:      Mouth: Mucous membranes are moist.      Pharynx: Oropharynx is clear.   Eyes:      Extraocular Movements: Extraocular movements intact.      Conjunctiva/sclera: Conjunctivae normal.      Pupils: Pupils are equal, round, and reactive to light.   Cardiovascular:      Rate and Rhythm: Normal rate and regular rhythm.      Heart sounds: No murmur heard.     No friction rub. No gallop.   Pulmonary:      Effort: Pulmonary effort is normal.      Breath sounds: Normal breath sounds.   Abdominal:      General: Bowel sounds are normal. There is no distension.      Palpations: Abdomen is soft. There is no mass.      Tenderness: There is no abdominal tenderness. There is no guarding.   Musculoskeletal:         General: No swelling. Normal range of " motion.      Cervical back: Normal range of motion.   Skin:     General: Skin is warm and dry.   Neurological:      General: No focal deficit present.      Mental Status: She is alert and oriented to person, place, and time.   Psychiatric:         Mood and Affect: Mood normal.         Thought Content: Thought content normal.         Judgment: Judgment normal.           Assessment/Plan   Problem List Items Addressed This Visit       Asthma - Primary    Relevant Medications    budesonide-formoteroL (Symbicort) 160-4.5 mcg/actuation inhaler     I think it is more allergy induced, recommend continuing current treatment, will increase dosage of Symbicort, recommend humidifier or nasal saline to help with moisture  Final diagnoses:   [J45.40] Moderate persistent asthma, unspecified whether complicated

## 2023-12-22 DIAGNOSIS — J45.40 MODERATE PERSISTENT ASTHMA, UNSPECIFIED WHETHER COMPLICATED (HHS-HCC): Primary | ICD-10-CM

## 2023-12-22 RX ORDER — FLUTICASONE PROPIONATE AND SALMETEROL 500; 50 UG/1; UG/1
1 POWDER RESPIRATORY (INHALATION)
Qty: 60 EACH | Refills: 11 | Status: SHIPPED | OUTPATIENT
Start: 2023-12-22 | End: 2024-05-08 | Stop reason: ALTCHOICE

## 2023-12-27 ENCOUNTER — CONSULT (OUTPATIENT)
Dept: CARDIOLOGY | Facility: CLINIC | Age: 38
End: 2023-12-27
Payer: COMMERCIAL

## 2023-12-27 VITALS
SYSTOLIC BLOOD PRESSURE: 90 MMHG | BODY MASS INDEX: 36.82 KG/M2 | HEART RATE: 68 BPM | DIASTOLIC BLOOD PRESSURE: 70 MMHG | OXYGEN SATURATION: 97 % | WEIGHT: 207.8 LBS | HEIGHT: 63 IN

## 2023-12-27 DIAGNOSIS — I87.001 POST-THROMBOTIC SYNDROME OF RIGHT LOWER EXTREMITY: Primary | ICD-10-CM

## 2023-12-27 DIAGNOSIS — Z86.718 HISTORY OF DVT (DEEP VEIN THROMBOSIS): ICD-10-CM

## 2023-12-27 DIAGNOSIS — J45.909 MILD ASTHMA WITHOUT COMPLICATION, UNSPECIFIED WHETHER PERSISTENT (HHS-HCC): Primary | ICD-10-CM

## 2023-12-27 DIAGNOSIS — M79.89 RIGHT LEG SWELLING: ICD-10-CM

## 2023-12-27 DIAGNOSIS — J45.909 MILD ASTHMA, UNSPECIFIED WHETHER COMPLICATED, UNSPECIFIED WHETHER PERSISTENT (HHS-HCC): ICD-10-CM

## 2023-12-27 DIAGNOSIS — I27.24 CHRONIC THROMBOEMBOLIC PULMONARY HYPERTENSION (MULTI): ICD-10-CM

## 2023-12-27 PROCEDURE — 99204 OFFICE O/P NEW MOD 45 MIN: CPT | Performed by: STUDENT IN AN ORGANIZED HEALTH CARE EDUCATION/TRAINING PROGRAM

## 2023-12-27 NOTE — PROGRESS NOTES
Referred by Milagros Lawton DO  Chief complaint:   Chief Complaint   Patient presents with    History of DVT        History of Present Illness  Alda Chaney is a 38 y.o. 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  Patient reports having since her right lower extremity DVT persistent right lower extremity swelling, that does not resolve with leg elevation.  She reports this swelling is predominantly painless.  However she does get some right leg discomfort after the end of long walks.  Sometimes gets intermittent stabbing pain in the right thigh.  No foot or wounds.  She only wears light compression stockings intermittently  She is on Xarelto 20 mg daily    Social History     Tobacco Use    Smoking status: Never     Passive exposure: Never    Smokeless tobacco: Never   Substance Use Topics    Alcohol use: Not Currently     Comment: Rarely    Drug use: Never       Outpatient Medications:  Current Outpatient Medications   Medication Instructions    acetaminophen (TylenoL) 325 mg tablet oral, TAKE 1 TO 2 TABLETS EVERY 4 HOURS AS NEEDED    albuterol 90 mcg/actuation inhaler 1 puff, inhalation, Every 4 hours PRN    betamethasone dipropionate 0.05 % cream 1 Application 2 times a day as needed.    budesonide-formoteroL (Symbicort) 160-4.5 mcg/actuation inhaler 2 puffs, inhalation, 2 times daily RT, Rinse mouth with water after use to reduce aftertaste and incidence of candidiasis. Do not swallow.    calcium carbonate-vitamin D3 600 mg-10 mcg (400 unit) tablet 2 tablets, oral, Daily RT    docusate sodium (Colace) 100 mg capsule 1 capsule, oral, 2 times daily    fexofenadine (ALLEGRA) 180 mg, oral, Daily    fluticasone (Flonase) 50 mcg/actuation nasal spray 1 spray, Each Nostril, Daily    fluticasone propion-salmeteroL (Advair Diskus) 500-50 mcg/dose diskus inhaler 1 puff,  inhalation, 2 times daily RT, Rinse mouth with water after use to reduce aftertaste and incidence of candidiasis. Do not swallow.    macitentan (Opsumit) 10 mg tablet tablet 1 tablet, oral, Daily    montelukast (SINGULAIR) 10 mg, oral, Nightly    mycophenolate (Cellcept) 500 mg tablet 2 tablets, oral, 2 times daily    pyridostigmine (Mestinon) 60 mg tablet 2 tablets, oral, 2 times daily    rimegepant (NURTEC ODT) 75 mg, oral, Every other day    rivaroxaban (Xarelto) 20 mg tablet 1 tablet, oral, Daily    selexipag (UPTRAVI) 200 mcg, oral, Daily    tadalafil (tadalafil, antihypertensive,) 20 mg tablet 2 tablets, oral, Daily         Vitals:  Vitals:    12/27/23 0921   BP: 90/70   Pulse: 68   SpO2: 97%       Physical Exam:  General: NAD, well-appearing  HEENT: moist mucous membranes, no jaundice  Neck: No JVD, no carotid bruit  Lungs: CTA cady, no wheezing or rales  Cardiac: RRR, no murmurs  Abdomen: soft, non-tender, non-distended  Extremities: 2+ radial pulses, 1+ nonpitting right leg edema, palpable pulses  Skin: warm, dry, no wounds  Neurologic: AAOx3,  no focal deficits         Reviewed Study(s):  CONCLUSIONS:  Right Lower Venous Insufficiency: There is reflux noted in the common femoral, mid femoral and dist femoral veins.  Right Lower Venous: No evidence of acute deep vein thrombus visualized in the right lower extremity. There are chronic changes visualized in the distal external iliac, common femoral, proximal femoral, mid femoral, distal Femoral and popliteal veins. The proximal femoral vein appears chronically occluded. The EIV was visualized in segments and diminished flow is noted with a large collateral at the distal segment, suggestive of potential outflow occlusion.  Left Lower Venous: Left common femoral vein is negative for deep vein thrombus.     Imaging & Doppler Findings:     Right                 Compressible Thrombus        Flow  Distal External Iliac   Partial    Chronic  Spontaneous/Phasic  CFV                      Partial    Chronic        Reflux  PFV                       Yes        None   Spontaneous/Phasic  FV Proximal                No      Chronic         None  FV Mid                  Partial    Chronic        Reflux  FV Distal               Partial    Chronic        Reflux  Popliteal               Partial    Chronic  Spontaneous/Phasic  Peroneal                  Yes        None  PTV                       Yes        None        Left Compress Thrombus        Flow  CFV    Yes      None   Spontaneous/Phasic            Assessment/Plan       # Right lower extremity postthrombotic syndrome  -Evidence of chronic changes in the femoral extremity pain.  Will obtain leg vein duplex and venous reflux study for evaluation  -Discussed importance of wearing compression stockings 20 to 30 mmHg pressure, regular walking exercise, and regular extremity rest  -Referral to lymphedema therapy  -Patient is on Xarelto 20 mg daily      Marion Samuels MD [facility]  Interventional Cardiology  Endovascular Interventions  [email or web address]    Thank you for allowing me to participate in the care of this patient. Please do not hesitate to contact me with any further questions or concerns.

## 2023-12-28 RX ORDER — RIVAROXABAN 20 MG/1
20 TABLET, FILM COATED ORAL DAILY
Qty: 30 TABLET | Refills: 11 | Status: SHIPPED | OUTPATIENT
Start: 2023-12-28

## 2023-12-29 ENCOUNTER — HOSPITAL ENCOUNTER (OUTPATIENT)
Dept: VASCULAR MEDICINE | Facility: HOSPITAL | Age: 38
Discharge: HOME | End: 2023-12-29
Payer: COMMERCIAL

## 2023-12-29 DIAGNOSIS — R60.0 LOCALIZED EDEMA: ICD-10-CM

## 2023-12-29 DIAGNOSIS — Z86.718 HISTORY OF DVT (DEEP VEIN THROMBOSIS): ICD-10-CM

## 2023-12-29 DIAGNOSIS — I87.001 POST-THROMBOTIC SYNDROME OF RIGHT LOWER EXTREMITY: ICD-10-CM

## 2023-12-29 PROCEDURE — 93978 VASCULAR STUDY: CPT | Performed by: SURGERY

## 2023-12-29 PROCEDURE — 93978 VASCULAR STUDY: CPT

## 2023-12-30 RX ORDER — ALBUTEROL SULFATE 90 UG/1
1 AEROSOL, METERED RESPIRATORY (INHALATION) EVERY 4 HOURS PRN
Qty: 8.5 G | Refills: 11 | Status: SHIPPED | OUTPATIENT
Start: 2023-12-30

## 2024-01-02 ENCOUNTER — HOSPITAL ENCOUNTER (OUTPATIENT)
Dept: VASCULAR MEDICINE | Facility: HOSPITAL | Age: 39
Discharge: HOME | End: 2024-01-02
Payer: COMMERCIAL

## 2024-01-02 DIAGNOSIS — M79.89 RIGHT LEG SWELLING: ICD-10-CM

## 2024-01-02 DIAGNOSIS — R60.0 LOCALIZED EDEMA: ICD-10-CM

## 2024-01-02 PROCEDURE — 93971 EXTREMITY STUDY: CPT | Performed by: INTERNAL MEDICINE

## 2024-01-02 PROCEDURE — 93971 EXTREMITY STUDY: CPT

## 2024-01-04 PROBLEM — K64.4 RESIDUAL HEMORRHOIDAL SKIN TAGS: Status: ACTIVE | Noted: 2023-04-26

## 2024-01-04 PROBLEM — Z99.81 DEPENDENCE ON SUPPLEMENTAL OXYGEN: Status: ACTIVE | Noted: 2023-04-26

## 2024-01-04 PROBLEM — K57.30 DIVERTICULOSIS OF LARGE INTESTINE: Status: ACTIVE | Noted: 2023-04-26

## 2024-01-04 PROBLEM — I87.001 POSTTHROMBOTIC SYNDROME OF RIGHT LOWER EXTREMITY: Status: ACTIVE | Noted: 2024-01-04

## 2024-01-04 PROBLEM — M79.89 SWELLING OF RIGHT LOWER EXTREMITY: Status: ACTIVE | Noted: 2024-01-04

## 2024-01-04 PROBLEM — M79.604 PAIN OF RIGHT LOWER EXTREMITY: Status: ACTIVE | Noted: 2024-01-04

## 2024-01-04 PROBLEM — K62.89 RECTAL PAIN: Status: ACTIVE | Noted: 2023-03-19

## 2024-01-04 PROBLEM — R60.0 LOCALIZED EDEMA: Status: ACTIVE | Noted: 2024-01-04

## 2024-01-04 PROBLEM — L02.92 FURUNCLE: Status: ACTIVE | Noted: 2024-01-04

## 2024-01-04 NOTE — PROGRESS NOTES
History Of Present Illness  Alda Chaney is a 38 y.o. female being seen today for CTEPH follow up. Her last visit was 9/25/2023. She has been with our clinic since 2012. No known admissions or new medical issues since her last visit. Seeing vascular doctor in [place].     PAH Treatment: Has restarted Opsumit, Uptravi (600mcg/600mcg) and tadalafil. Also taking Xarelto. She also has oxygen at 2LPM PRN with activity (does not use daily) and 2LPM HS.   Treatment history: Revatio (2013, transitioned to Tadalafil for compliance)    01/04/24 - testing includes echo and 6MWT today. Seen PCP for nasal congestion/chest tightness - patient attributes to allergies, using antihistamines, nasal sprays and inhalers. Increased PM dose Uptravi from 400 -> 600, has increased diarrhea - not using immodium.      Past Medical History  Patient Active Problem List   Diagnosis    Asthma    Complicated migraine    Cough with sputum    CTEPH (chronic thromboembolic pulmonary hypertension) (CMS/HCC)    DVT (deep venous thrombosis) (CMS/HCC)    Exertional shortness of breath    Limb pain    Myasthenia gravis, acetylcholine receptor antibody positive (CMS/HCC)    Obesity    Pulmonary embolism (CMS/HCC)    Pulmonary vein injury    Seasonal allergic reaction    Secondary hypercoagulable state (CMS/HCC)    Shoulder pain    URI (upper respiratory infection)    Anal fissure    BRBPR (bright red blood per rectum)    Cardiomegaly    Anxiety disorder    Swelling of right lower extremity    Residual hemorrhoidal skin tags    Rectal pain    Postthrombotic syndrome of right lower extremity    Pain of right lower extremity    Localized edema    Diverticulosis of large intestine    Furuncle    Dependence on supplemental oxygen    Hypertropia of left eye    Hypertropia of right eye        Surgical History  She has a past surgical history that includes Eye surgery (08/28/2013); Mouth surgery (08/28/2013); Other surgical history (12/16/2022); Thymectomy  (08/31/2017); Other surgical history (02/09/2017); MR angio head w and wo IV contrast (4/16/2014); MR angio chest w IV contrast (1/23/2018); IR CVC tunneled (9/27/2016); and MR angio chest w IV contrast (9/28/2012).     Social History  She reports that she has never smoked. She has never been exposed to tobacco smoke. She has never used smokeless tobacco. She reports that she does not currently use alcohol. She reports that she does not use drugs.    Family History  Family History   Problem Relation Name Age of Onset    Other (diabetes mellitus) Mother      Hypertension Mother      Other (diabetes mellitus) Father      Heart disease Father          Medications      Current Outpatient Medications:     acetaminophen (TylenoL) 325 mg tablet, Take by mouth. TAKE 1 TO 2 TABLETS EVERY 4 HOURS AS NEEDED, Disp: , Rfl:     albuterol 90 mcg/actuation inhaler, TAKE 1 PUFF BY MOUTH EVERY 4 HOURS AS NEEDED, Disp: 8.5 g, Rfl: 11    betamethasone dipropionate 0.05 % cream, 1 Application 2 times a day as needed., Disp: , Rfl:     budesonide-formoteroL (Symbicort) 160-4.5 mcg/actuation inhaler, Inhale 2 puffs 2 times a day. Rinse mouth with water after use to reduce aftertaste and incidence of candidiasis. Do not swallow., Disp: 10.2 g, Rfl: 2    calcium carbonate-vitamin D3 600 mg-10 mcg (400 unit) tablet, Take 2 tablets by mouth once daily., Disp: , Rfl:     docusate sodium (Colace) 100 mg capsule, Take 1 capsule (100 mg) by mouth 2 times a day., Disp: , Rfl:     fexofenadine (Allegra) 180 mg tablet, Take 1 tablet (180 mg) by mouth once daily., Disp: 90 tablet, Rfl: 1    fluticasone (Flonase) 50 mcg/actuation nasal spray, Administer 1 spray into each nostril once daily., Disp: , Rfl:     fluticasone propion-salmeteroL (Advair Diskus) 500-50 mcg/dose diskus inhaler, Inhale 1 puff 2 times a day. Rinse mouth with water after use to reduce aftertaste and incidence of candidiasis. Do not swallow. (Patient not taking: Reported on  12/27/2023), Disp: 60 each, Rfl: 11    macitentan (Opsumit) 10 mg tablet tablet, Take 1 tablet (10 mg) by mouth once daily., Disp: , Rfl:     montelukast (Singulair) 10 mg tablet, Take 1 tablet (10 mg) by mouth once daily at bedtime., Disp: , Rfl:     mycophenolate (Cellcept) 500 mg tablet, Take 2 tablets (1,000 mg) by mouth 2 times a day., Disp: , Rfl:     pyridostigmine (Mestinon) 60 mg tablet, Take 2 tablets (120 mg) by mouth 2 times a day., Disp: , Rfl:     rimegepant (Nurtec ODT) 75 mg tablet,disintegrating, Take 1 tablet (75 mg) by mouth every other day., Disp: , Rfl:     selexipag (Uptravi) 200 mcg tablet, Take 1 tablet (200 mcg) by mouth once daily., Disp: , Rfl:     tadalafil (tadalafil, antihypertensive,) 20 mg tablet, Take 2 tablets (40 mg) by mouth once daily., Disp: , Rfl:     Xarelto 20 mg tablet, TAKE 1 TABLET BY MOUTH EVERY DAY, Disp: 30 tablet, Rfl: 11     Allergies  Bee venom protein (honey bee), Ciprofloxacin, Levofloxacin, Magnesium, Magnesium chloride, and Magnesium sulfate    Review of Systems   Respiratory:  Positive for cough, shortness of breath and wheezing.    Cardiovascular:  Negative for chest pain and leg swelling.   Gastrointestinal:  Positive for diarrhea. Negative for nausea and vomiting.        3x daily.    Allergic/Immunologic: Positive for environmental allergies.   Neurological:  Negative for dizziness and syncope.       Last Recorded Vitals  There were no vitals taken for this visit.     Physical Exam  Constitutional:       Appearance: Normal appearance. She is obese.   HENT:      Head: Normocephalic.      Nose: Nose normal.   Cardiovascular:      Rate and Rhythm: Normal rate and regular rhythm.      Heart sounds: No murmur heard.  Pulmonary:      Effort: Pulmonary effort is normal.      Breath sounds: Normal breath sounds.   Abdominal:      General: Bowel sounds are normal.      Palpations: Abdomen is soft.   Musculoskeletal:      Right lower leg: No edema.      Left lower leg:  No edema.      Comments: RLE > LLE chronically.    Neurological:      General: No focal deficit present.      Mental Status: She is alert.   Psychiatric:         Mood and Affect: Mood normal.         Judgment: Judgment normal.            Relevant Results    6MWT (1/15/24)  HR 64 - 133  O2 94 - 86 on RA  Reuben 0 - 4  Meters 465     6MWT (23)  SpO2: 96% - 89% on RA  HR: 74 - 139  Reuben: 0 - 3  Actual 466m     6MWT (2023)  SpO2: 97-88% RA  HR:   Reuben: 0-3  Actual meters: 457    Echo (23)   Right Ventricle: The right ventricle is upper limits of normal in size. There is reduced right ventricular systolic function.  Right Atrium: The right atrium is normal in size.     6MWT (4/3/23)  SpO2: 97% - 91% on RA  HR: 72 - 135  Reuben: 0 - 3  Actual 450m     Echo (3/12/22): normal size RV with mildly reduced function, normal size RA    Pulmonary angiogram (2019) with Dr. Veronica - attempted left pulmonary artery angioplasty.  Pulmonary angioplasty (2019) with Dr. Veronica - balloon angio to distal left main, pulmonary artery and segmental branches to the lingula and left lower lobe.     Assessment/Plan     I have reviewed relevant data prior to patient visit includin MW , previous echo and 2019 angio with BPA. Echo today with unchanged, mild RV.     1) Pulmonary   a. CTED limited mostly to right lung - near normal RV - unchanged exercise ability FC II. Known previous desat with significant exercise, prescribed oxygen, will not use. 2015 stress echo with maintained RVSP , TAPSE and RV  size at peak exertion. Echo 10/23/2017 near normal to mild RV, likely unchanged. Cath last year 2017 with resting mPAP of 35 mm Hg. Somewhat difficult echo to follow, cardiac MRI performed on 2018, which showed   severe stenosis of distal left main pulmonary artery secondary to calcified perihilar lymph nodes with associated decreased perfusion of left lung in comparison to the right; normal LV, normal  AV, MV, and TV function, and enlargement of the main pulmonary artery. Uptravi started, significant side effects because she inappropriately escalated dosing without reviewing or contacting physician office as instructed. we then slowly increase dose and only change after direct consultation with pulmonary vascular office.      1/10/2022 - taking meds, no edema tolerating 400/400 ug selexipag.      9/12/2022 - stopped taking PH meds because she is in [place]. Still taking anticoagulation. Echo unchanged with muscular RV but normal size, unchanged compared to 2021.     4/3/2023 - Last seen September 2022 with normal echo but had interrupted meds due to insurance issues. She has not worked to clear this up but did not tell us. Today has insurance approval but has not restarted. Will restart. triple therapy sequentially.      9/25/2023 - (+) 9 m , unchanged drop in SpO2 to 89%, using meds, resistant to uptitration of selexipag due to work.    1/15/2024 - Specialty pharmacy claims no delivery since July 2023 for Uptravi. Patient claims she has significant previously delivered drug which she is using, this is possible given dose, etc. Patient unable to uptitrate past 600 BID due to diarrhea, headache. May have to decrease to 600/400 unless sx decrease over next 1-2 weeks. Walk similar but desaturation to 86%, recommend use of ambulatory oxygen.      b. Desaturation due to CTED and dead space- still SpO2 = 91% on room air.      c. 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 This Limits possibilities for PTE. Angio 4/2019 suggested improvement of this flow and patient underwent BPA with significant subjective improvement which has now waned. Unclear if this is due to restenosis or ?     d. Previously INcreasing wheezing and allergy symptoms, patient has had albuterol inhaler for long time but very, very rarely. Now using regular symbicort, no nocturnal  "symptoms.      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      3) Obesity with increased weight, BMI = 35.5-> 36.9->37-.37.6     4) COVID infection, minor dyspnea, no oxygen , stayed home, no CXR feels \"normal\" now.          Plan    1) Echo next visit 3-4 mo with 6MW cath and echo before.   2) Continue anticoagulation.   3) Weight loss.   4) COVID vaccination again recommended.  5) Continue tadalafil and macitentan  6) No uptitration of selexipag at this time given symptoms. currently at 600/600. May need to decrease to previous 600/400 unless side effects settle down.     ADDENDUM REQUESTED BY INSURANCE 7/3/2024 FOR CONTINUED APPROVAL OF PREVIOUSLY DELIVERED, APPROVED AND THERAPEUTICALLY USEFUL MACITENTAN.    To Address: \"Has documentation been submitted to confirm that patient has tried generic bosentan?\"    Bosentan was never trialed. Given the modern availability of non liver toxic drugs (macitentan and ambrisentan) it is not acceptable for insurance to advocate for a potentially liver toxic drug with potential patient harm when safe and effective alternatives are available.        "

## 2024-01-05 DIAGNOSIS — R06.02 SHORTNESS OF BREATH: ICD-10-CM

## 2024-01-05 DIAGNOSIS — I27.20 PULMONARY HYPERTENSION (MULTI): ICD-10-CM

## 2024-01-05 DIAGNOSIS — Z79.899 HIGH RISK MEDICATION USE: ICD-10-CM

## 2024-01-12 ENCOUNTER — LAB (OUTPATIENT)
Dept: LAB | Facility: LAB | Age: 39
End: 2024-01-12
Payer: COMMERCIAL

## 2024-01-12 DIAGNOSIS — Z79.899 HIGH RISK MEDICATION USE: ICD-10-CM

## 2024-01-12 LAB — HCG UR QL IA.RAPID: NEGATIVE

## 2024-01-12 PROCEDURE — 81025 URINE PREGNANCY TEST: CPT

## 2024-01-15 ENCOUNTER — HOSPITAL ENCOUNTER (OUTPATIENT)
Dept: RESPIRATORY THERAPY | Facility: HOSPITAL | Age: 39
Discharge: HOME | End: 2024-01-15
Payer: COMMERCIAL

## 2024-01-15 ENCOUNTER — APPOINTMENT (OUTPATIENT)
Dept: CARDIOLOGY | Facility: HOSPITAL | Age: 39
End: 2024-01-15
Payer: COMMERCIAL

## 2024-01-15 ENCOUNTER — OFFICE VISIT (OUTPATIENT)
Dept: PULMONOLOGY | Facility: HOSPITAL | Age: 39
End: 2024-01-15
Payer: COMMERCIAL

## 2024-01-15 ENCOUNTER — APPOINTMENT (OUTPATIENT)
Dept: RESPIRATORY THERAPY | Facility: HOSPITAL | Age: 39
End: 2024-01-15
Payer: COMMERCIAL

## 2024-01-15 VITALS
SYSTOLIC BLOOD PRESSURE: 109 MMHG | WEIGHT: 210 LBS | HEART RATE: 64 BPM | BODY MASS INDEX: 37.2 KG/M2 | DIASTOLIC BLOOD PRESSURE: 73 MMHG | OXYGEN SATURATION: 94 %

## 2024-01-15 DIAGNOSIS — R06.02 EXERTIONAL SHORTNESS OF BREATH: ICD-10-CM

## 2024-01-15 DIAGNOSIS — R19.7 DIARRHEA, UNSPECIFIED TYPE: ICD-10-CM

## 2024-01-15 DIAGNOSIS — I27.20 PULMONARY HYPERTENSION (MULTI): ICD-10-CM

## 2024-01-15 DIAGNOSIS — I27.24 CTEPH (CHRONIC THROMBOEMBOLIC PULMONARY HYPERTENSION) (MULTI): Primary | ICD-10-CM

## 2024-01-15 DIAGNOSIS — R06.02 SHORTNESS OF BREATH: ICD-10-CM

## 2024-01-15 DIAGNOSIS — Z91.89 AT RISK FOR SIDE EFFECT OF MEDICATION: ICD-10-CM

## 2024-01-15 PROCEDURE — 3008F BODY MASS INDEX DOCD: CPT | Performed by: INTERNAL MEDICINE

## 2024-01-15 PROCEDURE — 99215 OFFICE O/P EST HI 40 MIN: CPT | Performed by: INTERNAL MEDICINE

## 2024-01-15 PROCEDURE — 1036F TOBACCO NON-USER: CPT | Performed by: INTERNAL MEDICINE

## 2024-01-15 RX ORDER — LOPERAMIDE HCL 2 MG
2 TABLET ORAL 4 TIMES DAILY PRN
Qty: 30 TABLET | Refills: 11 | Status: CANCELLED | OUTPATIENT
Start: 2024-01-15 | End: 2025-01-14

## 2024-01-15 RX ORDER — LOPERAMIDE HCL 2 MG
2 TABLET ORAL 4 TIMES DAILY PRN
Qty: 30 TABLET | Refills: 11 | Status: SHIPPED | OUTPATIENT
Start: 2024-01-15 | End: 2025-01-14

## 2024-01-15 ASSESSMENT — PAIN SCALES - GENERAL: PAINLEVEL: 0-NO PAIN

## 2024-01-15 ASSESSMENT — ENCOUNTER SYMPTOMS
DIZZINESS: 0
DIARRHEA: 1
WHEEZING: 1
SHORTNESS OF BREATH: 1
NAUSEA: 0
COUGH: 1
VOMITING: 0

## 2024-01-24 ENCOUNTER — APPOINTMENT (OUTPATIENT)
Dept: RADIOLOGY | Facility: HOSPITAL | Age: 39
End: 2024-01-24
Payer: COMMERCIAL

## 2024-01-24 ENCOUNTER — HOSPITAL ENCOUNTER (EMERGENCY)
Facility: HOSPITAL | Age: 39
Discharge: HOME | End: 2024-01-24
Attending: EMERGENCY MEDICINE
Payer: COMMERCIAL

## 2024-01-24 VITALS
HEIGHT: 63 IN | RESPIRATION RATE: 18 BRPM | DIASTOLIC BLOOD PRESSURE: 87 MMHG | WEIGHT: 207 LBS | OXYGEN SATURATION: 93 % | HEART RATE: 75 BPM | SYSTOLIC BLOOD PRESSURE: 123 MMHG | TEMPERATURE: 97.7 F | BODY MASS INDEX: 36.68 KG/M2

## 2024-01-24 DIAGNOSIS — R42 VERTIGO: Primary | ICD-10-CM

## 2024-01-24 LAB
ALBUMIN SERPL BCP-MCNC: 4.2 G/DL (ref 3.4–5)
ALP SERPL-CCNC: 85 U/L (ref 33–110)
ALT SERPL W P-5'-P-CCNC: 20 U/L (ref 7–45)
ANION GAP SERPL CALC-SCNC: 11 MMOL/L (ref 10–20)
AST SERPL W P-5'-P-CCNC: 14 U/L (ref 9–39)
BASOPHILS # BLD AUTO: 0.03 X10*3/UL (ref 0–0.1)
BASOPHILS NFR BLD AUTO: 0.4 %
BILIRUB SERPL-MCNC: 0.4 MG/DL (ref 0–1.2)
BNP SERPL-MCNC: 21 PG/ML (ref 0–99)
BUN SERPL-MCNC: 12 MG/DL (ref 6–23)
CALCIUM SERPL-MCNC: 8.9 MG/DL (ref 8.6–10.3)
CARDIAC TROPONIN I PNL SERPL HS: <3 NG/L (ref 0–13)
CHLORIDE SERPL-SCNC: 105 MMOL/L (ref 98–107)
CO2 SERPL-SCNC: 26 MMOL/L (ref 21–32)
CREAT SERPL-MCNC: 0.63 MG/DL (ref 0.5–1.05)
EGFRCR SERPLBLD CKD-EPI 2021: >90 ML/MIN/1.73M*2
EOSINOPHIL # BLD AUTO: 0 X10*3/UL (ref 0–0.7)
EOSINOPHIL NFR BLD AUTO: 0 %
ERYTHROCYTE [DISTWIDTH] IN BLOOD BY AUTOMATED COUNT: 13.5 % (ref 11.5–14.5)
FLUAV RNA RESP QL NAA+PROBE: NOT DETECTED
FLUBV RNA RESP QL NAA+PROBE: NOT DETECTED
GLUCOSE SERPL-MCNC: 94 MG/DL (ref 74–99)
HCT VFR BLD AUTO: 41.2 % (ref 36–46)
HGB BLD-MCNC: 13.6 G/DL (ref 12–16)
IMM GRANULOCYTES # BLD AUTO: 0.02 X10*3/UL (ref 0–0.7)
IMM GRANULOCYTES NFR BLD AUTO: 0.3 % (ref 0–0.9)
LYMPHOCYTES # BLD AUTO: 1.67 X10*3/UL (ref 1.2–4.8)
LYMPHOCYTES NFR BLD AUTO: 24.7 %
MAGNESIUM SERPL-MCNC: 1.44 MG/DL (ref 1.6–2.4)
MCH RBC QN AUTO: 30.3 PG (ref 26–34)
MCHC RBC AUTO-ENTMCNC: 33 G/DL (ref 32–36)
MCV RBC AUTO: 92 FL (ref 80–100)
MONOCYTES # BLD AUTO: 0.39 X10*3/UL (ref 0.1–1)
MONOCYTES NFR BLD AUTO: 5.8 %
NEUTROPHILS # BLD AUTO: 4.65 X10*3/UL (ref 1.2–7.7)
NEUTROPHILS NFR BLD AUTO: 68.8 %
NRBC BLD-RTO: 0 /100 WBCS (ref 0–0)
PLATELET # BLD AUTO: 283 X10*3/UL (ref 150–450)
POTASSIUM SERPL-SCNC: 4 MMOL/L (ref 3.5–5.3)
PROT SERPL-MCNC: 7 G/DL (ref 6.4–8.2)
RBC # BLD AUTO: 4.49 X10*6/UL (ref 4–5.2)
RSV RNA RESP QL NAA+PROBE: NOT DETECTED
SARS-COV-2 RNA RESP QL NAA+PROBE: NOT DETECTED
SODIUM SERPL-SCNC: 138 MMOL/L (ref 136–145)
WBC # BLD AUTO: 6.8 X10*3/UL (ref 4.4–11.3)

## 2024-01-24 PROCEDURE — 2500000004 HC RX 250 GENERAL PHARMACY W/ HCPCS (ALT 636 FOR OP/ED): Performed by: EMERGENCY MEDICINE

## 2024-01-24 PROCEDURE — 83880 ASSAY OF NATRIURETIC PEPTIDE: CPT | Performed by: EMERGENCY MEDICINE

## 2024-01-24 PROCEDURE — 36415 COLL VENOUS BLD VENIPUNCTURE: CPT | Performed by: EMERGENCY MEDICINE

## 2024-01-24 PROCEDURE — 96361 HYDRATE IV INFUSION ADD-ON: CPT

## 2024-01-24 PROCEDURE — 84484 ASSAY OF TROPONIN QUANT: CPT | Performed by: EMERGENCY MEDICINE

## 2024-01-24 PROCEDURE — 96360 HYDRATION IV INFUSION INIT: CPT

## 2024-01-24 PROCEDURE — 80053 COMPREHEN METABOLIC PANEL: CPT | Performed by: EMERGENCY MEDICINE

## 2024-01-24 PROCEDURE — 83735 ASSAY OF MAGNESIUM: CPT | Performed by: EMERGENCY MEDICINE

## 2024-01-24 PROCEDURE — 93005 ELECTROCARDIOGRAM TRACING: CPT

## 2024-01-24 PROCEDURE — 71045 X-RAY EXAM CHEST 1 VIEW: CPT | Performed by: RADIOLOGY

## 2024-01-24 PROCEDURE — 70450 CT HEAD/BRAIN W/O DYE: CPT

## 2024-01-24 PROCEDURE — 85025 COMPLETE CBC W/AUTO DIFF WBC: CPT | Performed by: EMERGENCY MEDICINE

## 2024-01-24 PROCEDURE — 9420000001 HC RT PATIENT EDUCATION 5 MIN

## 2024-01-24 PROCEDURE — 71045 X-RAY EXAM CHEST 1 VIEW: CPT

## 2024-01-24 PROCEDURE — 70450 CT HEAD/BRAIN W/O DYE: CPT | Performed by: RADIOLOGY

## 2024-01-24 PROCEDURE — 2500000001 HC RX 250 WO HCPCS SELF ADMINISTERED DRUGS (ALT 637 FOR MEDICARE OP): Performed by: EMERGENCY MEDICINE

## 2024-01-24 PROCEDURE — 99285 EMERGENCY DEPT VISIT HI MDM: CPT | Mod: 25

## 2024-01-24 PROCEDURE — 87637 SARSCOV2&INF A&B&RSV AMP PRB: CPT | Performed by: EMERGENCY MEDICINE

## 2024-01-24 PROCEDURE — 99284 EMERGENCY DEPT VISIT MOD MDM: CPT | Performed by: EMERGENCY MEDICINE

## 2024-01-24 RX ORDER — ONDANSETRON 8 MG/1
8 TABLET, ORALLY DISINTEGRATING ORAL EVERY 8 HOURS PRN
Qty: 20 TABLET | Refills: 0 | Status: SHIPPED | OUTPATIENT
Start: 2024-01-24 | End: 2024-01-31

## 2024-01-24 RX ORDER — MECLIZINE HYDROCHLORIDE 25 MG/1
25 TABLET ORAL ONCE
Status: COMPLETED | OUTPATIENT
Start: 2024-01-24 | End: 2024-01-24

## 2024-01-24 RX ORDER — MECLIZINE HYDROCHLORIDE 25 MG/1
25 TABLET ORAL 3 TIMES DAILY PRN
Qty: 30 TABLET | Refills: 0 | Status: SHIPPED | OUTPATIENT
Start: 2024-01-24 | End: 2024-02-03

## 2024-01-24 RX ORDER — SODIUM CHLORIDE 9 MG/ML
150 INJECTION, SOLUTION INTRAVENOUS CONTINUOUS
Status: DISCONTINUED | OUTPATIENT
Start: 2024-01-24 | End: 2024-01-24 | Stop reason: HOSPADM

## 2024-01-24 RX ADMIN — MECLIZINE HYDROCHLORIDE 25 MG: 25 TABLET ORAL at 19:27

## 2024-01-24 RX ADMIN — SODIUM CHLORIDE 500 ML: 9 INJECTION, SOLUTION INTRAVENOUS at 19:20

## 2024-01-24 RX ADMIN — SODIUM CHLORIDE 150 ML/HR: 9 INJECTION, SOLUTION INTRAVENOUS at 21:00

## 2024-01-24 ASSESSMENT — PAIN DESCRIPTION - LOCATION: LOCATION: HEAD

## 2024-01-24 ASSESSMENT — PAIN DESCRIPTION - DESCRIPTORS: DESCRIPTORS: ACHING

## 2024-01-24 ASSESSMENT — COLUMBIA-SUICIDE SEVERITY RATING SCALE - C-SSRS
2. HAVE YOU ACTUALLY HAD ANY THOUGHTS OF KILLING YOURSELF?: NO
1. IN THE PAST MONTH, HAVE YOU WISHED YOU WERE DEAD OR WISHED YOU COULD GO TO SLEEP AND NOT WAKE UP?: NO
6. HAVE YOU EVER DONE ANYTHING, STARTED TO DO ANYTHING, OR PREPARED TO DO ANYTHING TO END YOUR LIFE?: NO

## 2024-01-24 ASSESSMENT — PAIN SCALES - GENERAL: PAINLEVEL_OUTOF10: 3

## 2024-01-24 ASSESSMENT — PAIN - FUNCTIONAL ASSESSMENT: PAIN_FUNCTIONAL_ASSESSMENT: 0-10

## 2024-01-24 NOTE — Clinical Note
Alda Chaney was seen and treated in our emergency department on 1/24/2024.  She may return to work on 01/30/2024.       If you have any questions or concerns, please don't hesitate to call.      Jacob Sin, DO

## 2024-01-25 ENCOUNTER — HOSPITAL ENCOUNTER (OUTPATIENT)
Dept: CARDIOLOGY | Facility: HOSPITAL | Age: 39
Discharge: HOME | End: 2024-01-25
Payer: COMMERCIAL

## 2024-01-25 PROCEDURE — 93005 ELECTROCARDIOGRAM TRACING: CPT

## 2024-01-25 NOTE — DISCHARGE INSTRUCTIONS
You were provided a work note to return on 30 January if you need further excuses please contact her primary care doctor.

## 2024-01-25 NOTE — ED PROVIDER NOTES
"HPI   Chief Complaint   Patient presents with    Dizziness     Patient complains of dizziness since yesterday, states it feels like the room is spinning. Complains of nausea but denies vomiting. Also states she has a history of pulmonary arterial hypertension and because of that wears 2 liters oxygen at night. States \"the past few nights Yaz been waking up feeling short of breath even with my oxygen on and I have been having to use my inhaler.\"       Multiple complaints.  This 38-year-old white female with history of myasthenia gravis and pulmonary artery hypertension presents to the ED with complaint of vertigo symptoms that began yesterday she states that today she began to have headache symptoms she also notes that she has had increasing wheezing and shortness of breath and mild night waking her up and causing her to have to use her albuterol MDI more frequently.  She also has had a chronic upper respiratory infection and a winter thought to be secondary to allergies patient states that he has been taking allergy medication without much improvement of her symptoms and has been seen by her primary care doctor she does see a pulmonologist for her pulmonary hypertension.      History provided by:  Patient   used: Yes                        Jesus Manuel Coma Scale Score: 15                  Patient History   Past Medical History:   Diagnosis Date    History of DVT (deep vein thrombosis) 02/15/2012    Hyperopia of both eyes 12/30/2021    Other pulmonary embolism without acute cor pulmonale (CMS/[facility]) 04/07/2020    Pulmonary embolism    Posterior vitreous detachment of both eyes 09/17/2019    Pulmonary embolism and infarction (CMS/[facility]) 02/15/2012    Vitreous floater, bilateral 09/17/2019     Past Surgical History:   Procedure Laterality Date    EYE SURGERY  08/28/2013    Eye Surgery    IR CVC TUNNELED  9/27/2016    [place] CVC [place] [facility] [place]    MOUTH SURGERY  08/28/2013    Oral Surgery " Tooth Extraction    MR CHEST ANGIO W IV CONTRAST  1/23/2018    MR CHEST ANGIO W IV CONTRAST 1/23/2018 [facility] [place]    MR CHEST ANGIO W IV CONTRAST  9/28/2012    MR CHEST ANGIO W IV CONTRAST 9/28/2012 [facility] [place] [facility]    MR HEAD ANGIO W AND WO IV CONTRAST  4/16/2014    MR HEAD ANGIO W AND WO IV CONTRAST 4/16/2014 [facility] ANCILLARY LEGACY    OTHER SURGICAL HISTORY  12/16/2022    Cardiac catheterization    OTHER SURGICAL HISTORY  02/09/2017    Sternotomy    THYMECTOMY  08/31/2017    Thymectomy     Family History   Problem Relation Name Age of Onset    Other (diabetes mellitus) Mother      Hypertension Mother      Other (diabetes mellitus) Father      Heart disease Father       Social History     Tobacco Use    Smoking status: Never     Passive exposure: Never    Smokeless tobacco: Never   Substance Use Topics    Alcohol use: Not Currently     Comment: Rarely    Drug use: Never       Physical Exam   ED Triage Vitals [01/24/24 1850]   Temperature Heart Rate Respirations BP   36.5 °C (97.7 °F) 77 16 150/80      Pulse Ox Temp Source Heart Rate Source Patient Position   (!) 92 % Temporal Monitor --      BP Location FiO2 (%)     -- --       Physical Exam  Vitals and nursing note reviewed.   Constitutional:       Appearance: Normal appearance. She is overweight.   HENT:      Head: Normocephalic and atraumatic.      Right Ear: Hearing, tympanic membrane, ear canal and external ear normal.      Left Ear: Hearing, tympanic membrane, ear canal and external ear normal.      Nose: Nose normal. No congestion or rhinorrhea.      Mouth/Throat:      Mouth: Mucous membranes are moist.      Pharynx: Oropharynx is clear. No oropharyngeal exudate or posterior oropharyngeal erythema.   Eyes:      General: Lids are normal.         Right eye: No discharge.         Left eye: No discharge.      Extraocular Movements: Extraocular movements intact.      Conjunctiva/sclera: Conjunctivae normal.      Pupils: Pupils are equal, round, and  reactive to light.   Cardiovascular:      Rate and Rhythm: Normal rate and regular rhythm.      Pulses: Normal pulses.      Heart sounds: Normal heart sounds. No murmur heard.     No friction rub. No gallop.   Pulmonary:      Effort: Pulmonary effort is normal. No respiratory distress.      Breath sounds: Normal breath sounds. No stridor. No wheezing, rhonchi or rales.   Chest:      Chest wall: No tenderness.   Abdominal:      General: Abdomen is flat. Bowel sounds are normal. There is no distension.      Palpations: Abdomen is soft. There is no mass.      Tenderness: There is no abdominal tenderness. There is no guarding or rebound.      Hernia: No hernia is present.   Musculoskeletal:         General: No swelling, tenderness, deformity or signs of injury. Normal range of motion.      Cervical back: Full passive range of motion without pain, normal range of motion and neck supple.      Right lower le+ Edema present.      Left lower le+ Edema present.   Skin:     General: Skin is warm and dry.      Capillary Refill: Capillary refill takes less than 2 seconds.      Coloration: Skin is not jaundiced or pale.      Findings: No bruising, erythema, lesion or rash.   Neurological:      General: No focal deficit present.      Mental Status: She is alert and oriented to person, place, and time.      GCS: GCS eye subscore is 4. GCS verbal subscore is 5. GCS motor subscore is 6.      Cranial Nerves: Cranial nerves 2-12 are intact. No cranial nerve deficit.      Sensory: Sensation is intact. No sensory deficit.      Motor: Motor function is intact. No weakness.      Coordination: Coordination normal.      Deep Tendon Reflexes: Reflexes normal.   Psychiatric:         Attention and Perception: Attention and perception normal.         Mood and Affect: Mood and affect normal.         Speech: Speech normal.         Behavior: Behavior normal. Behavior is cooperative.         Thought Content: Thought content normal.          Cognition and Memory: Cognition and memory normal.         Judgment: Judgment normal.         ED Course & MDM   Diagnoses as of 01/25/24 043   Vertigo       Medical Decision Making  Patient was seen and evaluated due to vertigo as well and shortness of breath at nighttime.  Patient states that she was just seen by her pulmonologist and after having asthma attack and problem they did not do anything about that.  Her chest x-ray was read by radiologist as cardiomegaly with concern for congestive heart failure I did do a BNP that was normal therefore I do not think she is having congestive heart failure and clinically she is not having as well.  She did have some improvement.  The Antivert.  CT imaging of the brain was normal.  Her magnesium level was low at 1.44 and did have a discussion with that concerning that she states that she is not to take magnesium because of her myasthenia gravis.  Patient will be referred to ENT for follow-up prescribed Antivert and provided work note.    Amount and/or Complexity of Data Reviewed  ECG/medicine tests: independent interpretation performed.     Details: EKG was interpreted by myself at 1858 reveals normal sinus rhythm with a incomplete right bundle branch block pattern with no acute ST or T wave changes there are some nonspecific flipped T's in leads V1 V4.  Heart rate 72 bpm.  The MS interval is 186 ms.  QRS duration is 102 ms.  The QTc is 424 ms.  Axis is 77 degrees.        Procedure  Procedures     Jacob Sin,   01/25/24 8223

## 2024-01-28 LAB
ATRIAL RATE: 72 BPM
P AXIS: 66 DEGREES
P OFFSET: 171 MS
P ONSET: 113 MS
PR INTERVAL: 186 MS
Q ONSET: 206 MS
QRS COUNT: 12 BEATS
QRS DURATION: 102 MS
QT INTERVAL: 388 MS
QTC CALCULATION(BAZETT): 424 MS
QTC FREDERICIA: 412 MS
R AXIS: 77 DEGREES
T AXIS: 51 DEGREES
T OFFSET: 400 MS
VENTRICULAR RATE: 72 BPM

## 2024-02-21 DIAGNOSIS — I27.24 CTEPH (CHRONIC THROMBOEMBOLIC PULMONARY HYPERTENSION) (MULTI): ICD-10-CM

## 2024-02-21 DIAGNOSIS — R06.02 EXERTIONAL SHORTNESS OF BREATH: ICD-10-CM

## 2024-03-12 DIAGNOSIS — I27.24 CTEPH (CHRONIC THROMBOEMBOLIC PULMONARY HYPERTENSION) (MULTI): Primary | ICD-10-CM

## 2024-03-13 RX ORDER — TADALAFIL 20 MG/1
40 TABLET ORAL DAILY
Qty: 60 TABLET | Refills: 2 | Status: SHIPPED | OUTPATIENT
Start: 2024-03-13

## 2024-03-21 ENCOUNTER — LAB (OUTPATIENT)
Dept: LAB | Facility: LAB | Age: 39
End: 2024-03-21
Payer: COMMERCIAL

## 2024-03-21 DIAGNOSIS — Z79.899 HIGH RISK MEDICATION USE: ICD-10-CM

## 2024-03-21 DIAGNOSIS — R06.02 EXERTIONAL SHORTNESS OF BREATH: ICD-10-CM

## 2024-03-21 DIAGNOSIS — I27.24 CTEPH (CHRONIC THROMBOEMBOLIC PULMONARY HYPERTENSION) (MULTI): ICD-10-CM

## 2024-03-21 LAB
ERYTHROCYTE [DISTWIDTH] IN BLOOD BY AUTOMATED COUNT: 14.6 % (ref 11.5–14.5)
HCG UR QL IA.RAPID: NEGATIVE
HCT VFR BLD AUTO: 46.8 % (ref 36–46)
HGB BLD-MCNC: 14.7 G/DL (ref 12–16)
MCH RBC QN AUTO: 29.8 PG (ref 26–34)
MCHC RBC AUTO-ENTMCNC: 31.4 G/DL (ref 32–36)
MCV RBC AUTO: 95 FL (ref 80–100)
NRBC BLD-RTO: 0 /100 WBCS (ref 0–0)
PLATELET # BLD AUTO: 257 X10*3/UL (ref 150–450)
RBC # BLD AUTO: 4.94 X10*6/UL (ref 4–5.2)
WBC # BLD AUTO: 5.3 X10*3/UL (ref 4.4–11.3)

## 2024-03-21 PROCEDURE — 83880 ASSAY OF NATRIURETIC PEPTIDE: CPT

## 2024-03-21 PROCEDURE — 36415 COLL VENOUS BLD VENIPUNCTURE: CPT

## 2024-03-21 PROCEDURE — 81025 URINE PREGNANCY TEST: CPT

## 2024-03-21 PROCEDURE — 85027 COMPLETE CBC AUTOMATED: CPT

## 2024-03-22 LAB — BNP SERPL-MCNC: 11 PG/ML (ref 0–99)

## 2024-03-27 ENCOUNTER — OFFICE VISIT (OUTPATIENT)
Dept: CARDIOLOGY | Facility: CLINIC | Age: 39
End: 2024-03-27
Payer: COMMERCIAL

## 2024-03-27 ENCOUNTER — DOCUMENTATION (OUTPATIENT)
Dept: PULMONOLOGY | Facility: HOSPITAL | Age: 39
End: 2024-03-27

## 2024-03-27 VITALS
WEIGHT: 203 LBS | OXYGEN SATURATION: 98 % | HEART RATE: 59 BPM | DIASTOLIC BLOOD PRESSURE: 68 MMHG | SYSTOLIC BLOOD PRESSURE: 110 MMHG | HEIGHT: 63 IN | BODY MASS INDEX: 35.97 KG/M2

## 2024-03-27 DIAGNOSIS — I82.511 CHRONIC DEEP VEIN THROMBOSIS (DVT) OF FEMORAL VEIN OF RIGHT LOWER EXTREMITY (MULTI): ICD-10-CM

## 2024-03-27 DIAGNOSIS — I87.001 POSTTHROMBOTIC SYNDROME OF RIGHT LOWER EXTREMITY: Primary | ICD-10-CM

## 2024-03-27 DIAGNOSIS — I27.24 CTEPH (CHRONIC THROMBOEMBOLIC PULMONARY HYPERTENSION) (MULTI): ICD-10-CM

## 2024-03-27 PROCEDURE — 3008F BODY MASS INDEX DOCD: CPT | Performed by: STUDENT IN AN ORGANIZED HEALTH CARE EDUCATION/TRAINING PROGRAM

## 2024-03-27 PROCEDURE — 99214 OFFICE O/P EST MOD 30 MIN: CPT | Performed by: STUDENT IN AN ORGANIZED HEALTH CARE EDUCATION/TRAINING PROGRAM

## 2024-03-27 NOTE — PROGRESS NOTES
Chief complaint:   Chief Complaint   Patient presents with    4 month follow up        History of Present Illness  Alda Chaney is a 39 y.o. 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      Patient reports having since her right lower extremity DVT persistent right lower extremity swelling.  She also endorses venous claudication on the right leg.  She works as a  and reports the pain limits her regular work life, since she has to avoid going on longer walks due to leg discomfort.  She also reports that she definitely feels the pain more when she goes on her 6-minute walk test.  Reports she also has painful vulvar varicosities,  She is wearing compression stockings more regularly.  And she has been maintained on Xarelto 20 mg daily    Social History     Tobacco Use    Smoking status: Never     Passive exposure: Never    Smokeless tobacco: Never   Substance Use Topics    Alcohol use: Not Currently     Comment: Rarely    Drug use: Never       Outpatient Medications:  Current Outpatient Medications   Medication Instructions    acetaminophen (TylenoL) 325 mg tablet oral, TAKE 1 TO 2 TABLETS EVERY 4 HOURS AS NEEDED    albuterol 90 mcg/actuation inhaler 1 puff, inhalation, Every 4 hours PRN    budesonide-formoteroL (Symbicort) 160-4.5 mcg/actuation inhaler 2 puffs, inhalation, 2 times daily RT, Rinse mouth with water after use to reduce aftertaste and incidence of candidiasis. Do not swallow.    calcium carbonate-vitamin D3 600 mg-10 mcg (400 unit) tablet 2 tablets, oral, Daily RT    fexofenadine (ALLEGRA) 180 mg, oral, Daily    fluticasone (Flonase) 50 mcg/actuation nasal spray 1 spray, Each Nostril, Daily    fluticasone propion-salmeteroL (Advair Diskus) 500-50 mcg/dose diskus inhaler 1 puff, inhalation, 2 times daily RT, Rinse mouth with water after use to reduce aftertaste and incidence of  candidiasis. Do not swallow.    loperamide (IMODIUM A-D) 2 mg, oral, 4 times daily PRN    macitentan (Opsumit) 10 mg tablet tablet 1 tablet, oral, Daily    montelukast (SINGULAIR) 10 mg, oral, Nightly    mycophenolate (CELLCEPT) 500 mg, oral, 2 times daily    pyridostigmine (Mestinon) 60 mg tablet 2 tablets, oral, Daily    selexipag (UPTRAVI) 400 mcg, oral, Daily    tadalafil (CIALIS) 40 mg, oral, Daily    Xarelto 20 mg, oral, Daily         Vitals:  Vitals:    03/27/24 1530   BP: 110/68   Pulse: 59   SpO2: 98%       Physical Exam:  General: NAD, well-appearing  HEENT: moist mucous membranes, no jaundice  Neck: No JVD, no carotid bruit  Lungs: CTA cady, no wheezing or rales  Cardiac: RRR, no murmurs  Abdomen: soft, non-tender, non-distended  Extremities: 2+ radial pulses, right lower extremity edema edema, palpable pulses  Skin: warm, dry, no wound  Neurologic: AAOx3,  no focal deficits             Reviewed Study(s):    Venous reflux ultrasound from January 2, 2024  -Predominantly deep venous reflux  -Chronic changes on the popliteal to proximal femoral veins     Iliac duplex 12/19/2023  -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  Patient is symptomatic - VCSS score 10  -All ultrasound images reviewed.  Will obtain CT abdomen pelvis with contrast to further evaluation for potential intervention  -Continue compression therapy  -Continue anticoagulation with Xarelto 20 mg daily      Jasmin Samuels MD [facility]  Interventional Cardiology  Endovascular Interventions  [email or web address]    **Disclaimer: This note was dictated by speech recognition, and every effort has been made to prevent any error in transcription, however minor errors may be present**

## 2024-03-28 ENCOUNTER — HOSPITAL ENCOUNTER (OUTPATIENT)
Facility: HOSPITAL | Age: 39
Setting detail: OUTPATIENT SURGERY
Discharge: HOME | End: 2024-03-28
Attending: INTERNAL MEDICINE | Admitting: INTERNAL MEDICINE
Payer: COMMERCIAL

## 2024-03-28 VITALS — WEIGHT: 203 LBS | BODY MASS INDEX: 35.97 KG/M2 | HEIGHT: 63 IN

## 2024-03-28 DIAGNOSIS — I27.24 CTEPH (CHRONIC THROMBOEMBOLIC PULMONARY HYPERTENSION) (MULTI): Primary | ICD-10-CM

## 2024-03-28 LAB
ANION GAP SERPL CALC-SCNC: 17 MMOL/L (ref 10–20)
BUN SERPL-MCNC: 10 MG/DL (ref 6–23)
CALCIUM SERPL-MCNC: 9.6 MG/DL (ref 8.6–10.6)
CHLORIDE SERPL-SCNC: 104 MMOL/L (ref 98–107)
CO2 SERPL-SCNC: 24 MMOL/L (ref 21–32)
CREAT SERPL-MCNC: 0.63 MG/DL (ref 0.5–1.05)
EGFRCR SERPLBLD CKD-EPI 2021: >90 ML/MIN/1.73M*2
GLUCOSE SERPL-MCNC: 92 MG/DL (ref 74–99)
HOLD SPECIMEN: NORMAL
POTASSIUM SERPL-SCNC: 4.3 MMOL/L (ref 3.5–5.3)
SODIUM SERPL-SCNC: 141 MMOL/L (ref 136–145)

## 2024-03-28 PROCEDURE — 93451 RIGHT HEART CATH: CPT | Performed by: INTERNAL MEDICINE

## 2024-03-28 PROCEDURE — C1894 INTRO/SHEATH, NON-LASER: HCPCS | Performed by: INTERNAL MEDICINE

## 2024-03-28 PROCEDURE — 7100000009 HC PHASE TWO TIME - INITIAL BASE CHARGE: Performed by: INTERNAL MEDICINE

## 2024-03-28 PROCEDURE — 99153 MOD SED SAME PHYS/QHP EA: CPT | Performed by: INTERNAL MEDICINE

## 2024-03-28 PROCEDURE — C1887 CATHETER, GUIDING: HCPCS | Performed by: INTERNAL MEDICINE

## 2024-03-28 PROCEDURE — 7100000010 HC PHASE TWO TIME - EACH INCREMENTAL 1 MINUTE: Performed by: INTERNAL MEDICINE

## 2024-03-28 PROCEDURE — 80048 BASIC METABOLIC PNL TOTAL CA: CPT | Performed by: INTERNAL MEDICINE

## 2024-03-28 PROCEDURE — 2720000007 HC OR 272 NO HCPCS: Performed by: INTERNAL MEDICINE

## 2024-03-28 PROCEDURE — 99152 MOD SED SAME PHYS/QHP 5/>YRS: CPT | Performed by: INTERNAL MEDICINE

## 2024-03-28 PROCEDURE — 36415 COLL VENOUS BLD VENIPUNCTURE: CPT | Performed by: INTERNAL MEDICINE

## 2024-03-28 PROCEDURE — 2500000004 HC RX 250 GENERAL PHARMACY W/ HCPCS (ALT 636 FOR OP/ED): Performed by: INTERNAL MEDICINE

## 2024-03-28 PROCEDURE — 2500000005 HC RX 250 GENERAL PHARMACY W/O HCPCS: Performed by: INTERNAL MEDICINE

## 2024-03-28 RX ORDER — FENTANYL CITRATE 50 UG/ML
INJECTION, SOLUTION INTRAMUSCULAR; INTRAVENOUS AS NEEDED
Status: DISCONTINUED | OUTPATIENT
Start: 2024-03-28 | End: 2024-03-28 | Stop reason: HOSPADM

## 2024-03-28 RX ORDER — LIDOCAINE HYDROCHLORIDE 10 MG/ML
INJECTION, SOLUTION EPIDURAL; INFILTRATION; INTRACAUDAL; PERINEURAL AS NEEDED
Status: DISCONTINUED | OUTPATIENT
Start: 2024-03-28 | End: 2024-03-28 | Stop reason: HOSPADM

## 2024-03-28 RX ORDER — MIDAZOLAM HYDROCHLORIDE 1 MG/ML
INJECTION INTRAMUSCULAR; INTRAVENOUS AS NEEDED
Status: DISCONTINUED | OUTPATIENT
Start: 2024-03-28 | End: 2024-03-28 | Stop reason: HOSPADM

## 2024-03-28 NOTE — DISCHARGE INSTRUCTIONS
CARDIAC CATHETERIZATION DISCHARGE INSTRUCTIONS (procedure done on ***)     FOR SUDDEN AND SEVERE CHEST PAIN, SHORTNESS OF BREATH, EXCESSIVE BLEEDING, SIGNS OF STROKE, OR CHANGES IN MENTAL STATUS YOU SHOULD CALL 911 IMMEDIATELY.     If your provider has prescribed aspirin and/or clopidogrel (Plavix), or prasugrel (Effient), or ticagrelor (Brilinta), DO NOT STOP THESE MEDICATIONS for any reason without talking to your cardiologist first. If any of these were prescribed, you must take them every day without missing a single dose. If you are getting low on these medications, contact your provider immediately for a refill.     FOR NEXT 24 HOURS  - Upon discharge, you should return home and rest for the remainder of the day and evening. You do not have to stay on bed rest but should not be very active.  It is recommended a responsible adult be with you for the first 24 hours after the procedure.    - No driving for 24 hours after procedure. Please arrange for someone to drive you home from the hospital today.     - Do not drive, operate machinery, or use power tools for 24 hours after your procedure.     - Do not make any legal decisions for 24 hours after your procedure.     - Do not drink alcoholic beverages for 24 hours after your procedure.    WOUND CARE   *FOR FEMORAL (LEG) ACCESS*  ·      Avoid heavy lifting (over 10 pounds) for 7 days, squatting or excessive bending for 2 days, and strenuous exercise for 7 days.  ·      No submerged bathing, swimming, or hot tubs for the next 7 days, or until fully healed.  ·      Avoid sexual activity for 3-4 days until any groin discomfort has ceased.     *FOR RADIAL (WRIST) ACCESS*  ·      No lifting more than 5 pounds or excessive use of the wrist for 24 hours - for example, treat your wrist as if it is sprained.  ·      Do not engage in vigorous activities (tennis, golf, bowling, weights) for at least 48 hours after the procedure.  ·      Do not submerge the wrist for  7 days after the procedure.  ·      You should expect mild tingling in your hand and tenderness at the puncture site for up to 3 days.    - The transparent dressing should be removed from the site 24 hours after the procedure.  Wash the site gently with soap and water. Rinse well and pat dry. Keep the area clean and dry. You may apply a Band-Aid to the site. Avoid lotions, ointments, or powders until fully healed.     - You may shower the day after your procedure.      - It is normal to notice a small bruise around the puncture site and/or a small grape sized or smaller lump. Any large bruising or large lump warrants a call to the office.     - If bleeding should occur, lay down and apply pressure to the affected area for 10 minutes.  If the bleeding stops notify your physician.  If there is a large amount of bleeding or spurting of blood CALL 911 immediately.  DO NOT drive yourself to the hospital.    - You may experience some tenderness, bruising or minimal inflammation.  If you have any concerns, you may contact the Cath Lab or if any of these symptoms become excessive, contact your cardiologist or go to the emergency room.     OTHER INSTRUCTIONS  - You may take acetaminophen (Tylenol) as directed for discomfort.  If pain is not relieved with acetaminophen (Tylenol), contact your doctor.    - If you notice or experience any of the following, you should notify your doctor or seek medical attention  Chest pain or discomfort  Change in mental status or weakness in extremities.  Dizziness, light headedness, or feeling faint.  Change in the site where the procedure was performed, such as bleeding or an increased area of bruising or swelling.  Tingling, numbness, pain, or coolness in the leg/arm beyond the site where the procedure was performed.  Signs of infection (i.e. shaking chills, temperature > 100 degrees Fahrenheit, warmth, redness) in the leg/arm area where the procedure was performed.  Changes in urination    Bloody or black stools  Vomiting blood  Severe nose bleeds  Any excessive bleeding    - If you DO NOT have an appointment with your cardiologist within 2-4 weeks following your procedure, please contact their office.

## 2024-03-28 NOTE — PROGRESS NOTES
Pharmacy Medication History Review    Alda Chaney is a 39 y.o. female admitted for CTEPH (chronic thromboembolic pulmonary hypertension) (CMS/[facility]). Pharmacy reviewed the patient's [id number] medications and allergies for accuracy.    The list below reflects the updated PTA list. Comments regarding how patient may be taking medications differently can be found in the Admit Orders Activity  Prior to Admission Medications   Prescriptions Last Dose Informant Patient Reported?   Xarelto 20 mg tablet 3/25/2024 Self No   Sig: TAKE 1 TABLET BY MOUTH EVERY DAY   acetaminophen (TylenoL) 325 mg tablet 3/24/2024 Self Yes   Sig: Take by mouth. TAKE 1 TO 2 TABLETS EVERY 4 HOURS AS NEEDED   albuterol 90 mcg/actuation inhaler 3/28/2024 Self No   Sig: TAKE 1 PUFF BY MOUTH EVERY 4 HOURS AS NEEDED   budesonide-formoteroL (Symbicort) 160-4.5 mcg/actuation inhaler 3/25/2024 Self No   Sig: Inhale 2 puffs 2 times a day. Rinse mouth with water after use to reduce aftertaste and incidence of candidiasis. Do not swallow.   calcium carbonate-vitamin D3 600 mg-10 mcg (400 unit) tablet 3/25/2024 Self Yes   Sig: Take 2 tablets by mouth once daily.   fexofenadine (Allegra) 180 mg tablet 3/25/2024 Self No   Sig: Take 1 tablet (180 mg) by mouth once daily.   fluticasone (Flonase) 50 mcg/actuation nasal spray 3/25/2024 Self Yes   Sig: Administer 1 spray into each nostril once daily.   fluticasone propion-salmeteroL (Advair Diskus) 500-50 mcg/dose diskus inhaler  Self No   Sig: Inhale 1 puff 2 times a day. Rinse mouth with water after use to reduce aftertaste and incidence of candidiasis. Do not swallow.   Patient not taking: Reported on 12/27/2023   loperamide (Imodium A-D) 2 mg tablet 3/25/2024 Self No   Sig: Take 1 tablet (2 mg) by mouth 4 times a day as needed for diarrhea.   macitentan (Opsumit) 10 mg tablet tablet 3/25/2024 Self Yes   Sig: Take 1 tablet (10 mg) by mouth once daily.   montelukast (Singulair) 10 mg tablet 3/25/2024  Self Yes   Sig: Take 1 tablet (10 mg) by mouth once daily at bedtime.   mycophenolate (Cellcept) 500 mg tablet 3/25/2024 Self Yes   Sig: Take 1 tablet (500 mg) by mouth 2 times a day.   pyridostigmine (Mestinon) 60 mg tablet 3/25/2024 Self Yes   Sig: Take 2 tablets (120 mg) by mouth once daily.   selexipag (Uptravi) 200 mcg tablet 3/25/2024 Self Yes   Sig: Take 2 tablets (400 mcg) by mouth once daily.   tadalafil, antihypertensive, 20 mg tablet 3/25/2024 Self No   Sig: TAKE 2 TABLETS DAILY      Facility-Administered Medications: None        The list below reflects the updated allergy list. Please review each documented allergy for additional clarification and justification.  Allergies  Reviewed by Meron Banks PharmD on 3/28/2024        Severity Reactions Comments    Bee Venom Protein (honey Bee) High Anaphylaxis     Ciprofloxacin High Unknown     Levofloxacin High Other contraindicated with myesthenia gravis unk    Magnesium High Anaphylaxis     Magnesium Chloride High Unknown     Magnesium Sulfate High Unknown patient notified that elevated Mg level could result in respiratory distress.  not immunologically allergic to magnesium.            Patient declines M2B at discharge. Pharmacy has been updated to [facility] in [place].    Sources used to complete the med history include out patient fill history, OARRS, and patient interview- great historian, patient is well aware of all her medications, doses, and directions.      Below are additional concerns with the patient's PTA list.  Patient reports not taking (Betamethasone, Advair Diskus, Nurtec ODT, and Colace)  Patient reports taking Cellcept 500 as : 1 tab bid  Patient reports taking Pyridostigmine 60 as: 2 tabs every day  Patient reports taking Selexipag 200 mcg as : 2 tabs every day     Meron Banks PharmD  Transitions of Care Pharmacist  [facility]  Please reach out via Secure Chat for questions, or if no response call [email or web address] or [email or web address]  [facility]

## 2024-03-28 NOTE — POST-PROCEDURE NOTE
Physician Transition of Care Summary  Invasive Cardiovascular Lab    Procedure Date: 3/28/2024  Attending:    * Santiago Dimas - Primary  Resident/Fellow/Other Assistant: Surgeon(s) and Role:     * Wilfredo Schrader MD - Fellow    Indications:   Pre-op Diagnosis     * CTEPH (chronic thromboembolic pulmonary hypertension) (CMS/HCC) [I27.24]    Post-procedure diagnosis:   Post-op Diagnosis     * CTEPH (chronic thromboembolic pulmonary hypertension) (CMS/HCC) [I27.24]    Procedure(s):   Right Heart Cath  [address] - [place] RIGHT HEART CATH O2 SATURATION & CARDIAC OUTPUT        Procedure Findings:   RHC hemodynamics:  RA 7  RV 37/11 (13)  PCWP 13  PA 0/18 (27)  Mixed venous sat: 71%  CO/CI 5.1/2.6    After 2 minutes of left hand exercise:  PA 54/18 (35)  PCWP 15    Description of the Procedure:   Access: Right brachial vein access  Closure: manual pressure      Complications:   None    Stents/Implants:   N/A    Anticoagulation/Antiplatelet Plan:   Continue home xarelto    Estimated Blood Loss:   0 mL    Anesthesia: Moderate Sedation Anesthesia Staff: No anesthesia staff entered.    Any Specimen(s) Removed:   Order Name Source Comment Collection Info Order Time   BASIC METABOLIC PANEL Blood, Venous  Collected By: Tessa Crenshaw RN 3/28/2024  6:52 AM     Release result to [employer]   Immediate            Disposition:   home      Electronically signed by: Wilfredo Schrader MD, 3/28/2024 8:52 AM

## 2024-03-28 NOTE — H&P
History Of Present Illness  Alda Chaney is a 39 y.o. female with history of CTEPH presenting today for repeat RHC for hemodynamic assessment      Past Medical History  Past Medical History:   Diagnosis Date    History of DVT (deep vein thrombosis) 02/15/2012    Hyperopia of both eyes 12/30/2021    Other pulmonary embolism without acute cor pulmonale (CMS/HCC) 04/07/2020    Pulmonary embolism    Posterior vitreous detachment of both eyes 09/17/2019    Pulmonary embolism and infarction (CMS/HCC) 02/15/2012    Vitreous floater, bilateral 09/17/2019       Surgical History  Past Surgical History:   Procedure Laterality Date    EYE SURGERY  08/28/2013    Eye Surgery    IR CVC TUNNELED  9/27/2016    [place] C[place] [facility] [place]    MOUTH SURGERY  08/28/2013    Oral Surgery Tooth Extraction    MR CHEST ANGIO W IV CONTRAST  1/23/2018    MR CHEST ANGIO W IV CONTRAST [place] [facility] [place]    MR CHEST ANGIO W IV CONTRAST  9/28/2012    MR CHEST ANGIO W IV CONTRAST [place] [facility] [place]    MR HEAD ANGIO W AND WO IV CONTRAST  4/16/2014    MR HEAD ANGIO W AND WO IV CONTRAST 4/16/2014 [facility] ANCILLARY LEGACY    OTHER SURGICAL HISTORY  12/16/2022    Cardiac catheterization    OTHER SURGICAL HISTORY  02/09/2017    Sternotomy    THYMECTOMY  08/31/2017    Thymectomy        Social History  She reports that she has never smoked. She has never been exposed to tobacco smoke. She has never used smokeless tobacco. She reports that she does not currently use alcohol. She reports that she does not use drugs.    Family History  Family History   Problem Relation Name Age of Onset    Other (diabetes mellitus) Mother      Hypertension Mother      Other (diabetes mellitus) Father      Heart disease Father          Allergies  Bee venom protein (honey bee), Ciprofloxacin, Levofloxacin, Magnesium, Magnesium chloride, and Magnesium sulfate    Review of Systems     Physical Exam     General: NAD, AOx3  HEENT: EOMI, MMM,  no LAD, no thyroid nodule or enlargement.   Neck: -JVD, supple  Cardiac: Normal S, S2. No murmurs noted  Lungs:  Good bilateral air entry, clear lungs bilaterally  Abdomen: Soft, non-tender, Non-distended   Extremities: No LE edema   Neuro: AOx3, no apparent focal deficits       Assessment/Plan   Principal Problem:    CTEPH (chronic thromboembolic pulmonary hypertension) (CMS/HCC)      Will proceed with RHC today.           Wilfredo Schrader MD

## 2024-04-01 ENCOUNTER — TELEPHONE (OUTPATIENT)
Dept: CARDIOLOGY | Facility: CLINIC | Age: 39
End: 2024-04-01
Payer: COMMERCIAL

## 2024-04-08 ENCOUNTER — HOSPITAL ENCOUNTER (OUTPATIENT)
Dept: RADIOLOGY | Facility: HOSPITAL | Age: 39
Discharge: HOME | End: 2024-04-08
Payer: COMMERCIAL

## 2024-04-08 ENCOUNTER — APPOINTMENT (OUTPATIENT)
Dept: RADIOLOGY | Facility: HOSPITAL | Age: 39
End: 2024-04-08
Payer: COMMERCIAL

## 2024-04-08 DIAGNOSIS — I87.001 POSTTHROMBOTIC SYNDROME OF RIGHT LOWER EXTREMITY: ICD-10-CM

## 2024-04-08 DIAGNOSIS — I82.409 DVT (DEEP VENOUS THROMBOSIS) (MULTI): Primary | ICD-10-CM

## 2024-04-08 DIAGNOSIS — I82.409 DVT (DEEP VENOUS THROMBOSIS) (MULTI): ICD-10-CM

## 2024-04-08 PROCEDURE — 74174 CTA ABD&PLVS W/CONTRAST: CPT

## 2024-04-08 PROCEDURE — 2550000001 HC RX 255 CONTRASTS: Performed by: STUDENT IN AN ORGANIZED HEALTH CARE EDUCATION/TRAINING PROGRAM

## 2024-04-08 PROCEDURE — 74174 CTA ABD&PLVS W/CONTRAST: CPT | Performed by: RADIOLOGY

## 2024-04-08 RX ADMIN — IOHEXOL 90 ML: 350 INJECTION, SOLUTION INTRAVENOUS at 13:31

## 2024-04-10 ENCOUNTER — APPOINTMENT (OUTPATIENT)
Dept: CARDIOLOGY | Facility: CLINIC | Age: 39
End: 2024-04-10
Payer: COMMERCIAL

## 2024-04-15 ENCOUNTER — APPOINTMENT (OUTPATIENT)
Dept: RESPIRATORY THERAPY | Facility: HOSPITAL | Age: 39
End: 2024-04-15
Payer: COMMERCIAL

## 2024-04-15 ENCOUNTER — APPOINTMENT (OUTPATIENT)
Dept: PULMONOLOGY | Facility: HOSPITAL | Age: 39
End: 2024-04-15
Payer: COMMERCIAL

## 2024-04-15 ENCOUNTER — APPOINTMENT (OUTPATIENT)
Dept: CARDIOLOGY | Facility: HOSPITAL | Age: 39
End: 2024-04-15
Payer: COMMERCIAL

## 2024-04-17 ENCOUNTER — TELEMEDICINE (OUTPATIENT)
Dept: CARDIOLOGY | Facility: CLINIC | Age: 39
End: 2024-04-17
Payer: COMMERCIAL

## 2024-04-17 ENCOUNTER — APPOINTMENT (OUTPATIENT)
Dept: CARDIOLOGY | Facility: HOSPITAL | Age: 39
End: 2024-04-17
Payer: COMMERCIAL

## 2024-04-17 ENCOUNTER — HOSPITAL ENCOUNTER (EMERGENCY)
Facility: HOSPITAL | Age: 39
Discharge: HOME | End: 2024-04-17
Payer: COMMERCIAL

## 2024-04-17 ENCOUNTER — APPOINTMENT (OUTPATIENT)
Dept: RADIOLOGY | Facility: HOSPITAL | Age: 39
End: 2024-04-17
Payer: COMMERCIAL

## 2024-04-17 VITALS
DIASTOLIC BLOOD PRESSURE: 81 MMHG | TEMPERATURE: 97.5 F | SYSTOLIC BLOOD PRESSURE: 112 MMHG | OXYGEN SATURATION: 97 % | HEART RATE: 82 BPM | WEIGHT: 205 LBS | RESPIRATION RATE: 18 BRPM | HEIGHT: 63 IN | BODY MASS INDEX: 36.32 KG/M2

## 2024-04-17 DIAGNOSIS — I87.001 POSTTHROMBOTIC SYNDROME OF RIGHT LOWER EXTREMITY: Primary | ICD-10-CM

## 2024-04-17 DIAGNOSIS — J01.90 ACUTE SINUSITIS, RECURRENCE NOT SPECIFIED, UNSPECIFIED LOCATION: ICD-10-CM

## 2024-04-17 DIAGNOSIS — R06.00 DYSPNEA, UNSPECIFIED TYPE: ICD-10-CM

## 2024-04-17 DIAGNOSIS — R52 GENERALIZED BODY ACHES: ICD-10-CM

## 2024-04-17 DIAGNOSIS — R11.2 NAUSEA AND VOMITING, UNSPECIFIED VOMITING TYPE: ICD-10-CM

## 2024-04-17 DIAGNOSIS — R10.9 ACUTE ABDOMINAL PAIN: ICD-10-CM

## 2024-04-17 DIAGNOSIS — I82.511 CHRONIC DEEP VEIN THROMBOSIS (DVT) OF FEMORAL VEIN OF RIGHT LOWER EXTREMITY (MULTI): ICD-10-CM

## 2024-04-17 DIAGNOSIS — R42 DIZZINESS: Primary | ICD-10-CM

## 2024-04-17 LAB
ALBUMIN SERPL BCP-MCNC: 4 G/DL (ref 3.4–5)
ALP SERPL-CCNC: 86 U/L (ref 33–110)
ALT SERPL W P-5'-P-CCNC: 24 U/L (ref 7–45)
ANION GAP SERPL CALC-SCNC: 10 MMOL/L (ref 10–20)
APTT PPP: 30 SECONDS (ref 27–38)
AST SERPL W P-5'-P-CCNC: 17 U/L (ref 9–39)
B-HCG SERPL-ACNC: <2 MIU/ML
BASOPHILS # BLD AUTO: 0.01 X10*3/UL (ref 0–0.1)
BASOPHILS NFR BLD AUTO: 0.2 %
BILIRUB SERPL-MCNC: 0.4 MG/DL (ref 0–1.2)
BNP SERPL-MCNC: 89 PG/ML (ref 0–99)
BUN SERPL-MCNC: 5 MG/DL (ref 6–23)
CALCIUM SERPL-MCNC: 8.7 MG/DL (ref 8.6–10.3)
CARDIAC TROPONIN I PNL SERPL HS: 7 NG/L (ref 0–13)
CARDIAC TROPONIN I PNL SERPL HS: 9 NG/L (ref 0–13)
CHLORIDE SERPL-SCNC: 102 MMOL/L (ref 98–107)
CO2 SERPL-SCNC: 28 MMOL/L (ref 21–32)
CREAT SERPL-MCNC: 0.76 MG/DL (ref 0.5–1.05)
EGFRCR SERPLBLD CKD-EPI 2021: >90 ML/MIN/1.73M*2
EOSINOPHIL # BLD AUTO: 0.01 X10*3/UL (ref 0–0.7)
EOSINOPHIL NFR BLD AUTO: 0.2 %
ERYTHROCYTE [DISTWIDTH] IN BLOOD BY AUTOMATED COUNT: 14.4 % (ref 11.5–14.5)
FLUAV RNA RESP QL NAA+PROBE: NOT DETECTED
FLUBV RNA RESP QL NAA+PROBE: NOT DETECTED
GLUCOSE SERPL-MCNC: 104 MG/DL (ref 74–99)
HCT VFR BLD AUTO: 42.4 % (ref 36–46)
HGB BLD-MCNC: 13.3 G/DL (ref 12–16)
IMM GRANULOCYTES # BLD AUTO: 0.01 X10*3/UL (ref 0–0.7)
IMM GRANULOCYTES NFR BLD AUTO: 0.2 % (ref 0–0.9)
INR PPP: 1.2 (ref 0.9–1.1)
LACTATE SERPL-SCNC: 1 MMOL/L (ref 0.4–2)
LIPASE SERPL-CCNC: 11 U/L (ref 9–82)
LYMPHOCYTES # BLD AUTO: 1.13 X10*3/UL (ref 1.2–4.8)
LYMPHOCYTES NFR BLD AUTO: 27.8 %
MAGNESIUM SERPL-MCNC: 1.5 MG/DL (ref 1.6–2.4)
MCH RBC QN AUTO: 29.5 PG (ref 26–34)
MCHC RBC AUTO-ENTMCNC: 31.4 G/DL (ref 32–36)
MCV RBC AUTO: 94 FL (ref 80–100)
MONOCYTES # BLD AUTO: 0.33 X10*3/UL (ref 0.1–1)
MONOCYTES NFR BLD AUTO: 8.1 %
NEUTROPHILS # BLD AUTO: 2.58 X10*3/UL (ref 1.2–7.7)
NEUTROPHILS NFR BLD AUTO: 63.5 %
NRBC BLD-RTO: 0 /100 WBCS (ref 0–0)
PLATELET # BLD AUTO: 236 X10*3/UL (ref 150–450)
POTASSIUM SERPL-SCNC: 3.7 MMOL/L (ref 3.5–5.3)
PROT SERPL-MCNC: 6.8 G/DL (ref 6.4–8.2)
PROTHROMBIN TIME: 13.4 SECONDS (ref 9.8–12.8)
RBC # BLD AUTO: 4.51 X10*6/UL (ref 4–5.2)
SARS-COV-2 RNA RESP QL NAA+PROBE: NOT DETECTED
SODIUM SERPL-SCNC: 136 MMOL/L (ref 136–145)
WBC # BLD AUTO: 4.1 X10*3/UL (ref 4.4–11.3)

## 2024-04-17 PROCEDURE — 85730 THROMBOPLASTIN TIME PARTIAL: CPT | Performed by: PHYSICIAN ASSISTANT

## 2024-04-17 PROCEDURE — 71275 CT ANGIOGRAPHY CHEST: CPT

## 2024-04-17 PROCEDURE — 87040 BLOOD CULTURE FOR BACTERIA: CPT | Mod: SAMLAB | Performed by: PHYSICIAN ASSISTANT

## 2024-04-17 PROCEDURE — 71275 CT ANGIOGRAPHY CHEST: CPT | Performed by: STUDENT IN AN ORGANIZED HEALTH CARE EDUCATION/TRAINING PROGRAM

## 2024-04-17 PROCEDURE — 2500000005 HC RX 250 GENERAL PHARMACY W/O HCPCS: Performed by: PHYSICIAN ASSISTANT

## 2024-04-17 PROCEDURE — 74177 CT ABD & PELVIS W/CONTRAST: CPT | Performed by: STUDENT IN AN ORGANIZED HEALTH CARE EDUCATION/TRAINING PROGRAM

## 2024-04-17 PROCEDURE — 2500000001 HC RX 250 WO HCPCS SELF ADMINISTERED DRUGS (ALT 637 FOR MEDICARE OP): Performed by: PHYSICIAN ASSISTANT

## 2024-04-17 PROCEDURE — 36415 COLL VENOUS BLD VENIPUNCTURE: CPT | Performed by: PHYSICIAN ASSISTANT

## 2024-04-17 PROCEDURE — 87636 SARSCOV2 & INF A&B AMP PRB: CPT | Performed by: PHYSICIAN ASSISTANT

## 2024-04-17 PROCEDURE — 80053 COMPREHEN METABOLIC PANEL: CPT | Performed by: PHYSICIAN ASSISTANT

## 2024-04-17 PROCEDURE — 85610 PROTHROMBIN TIME: CPT | Performed by: PHYSICIAN ASSISTANT

## 2024-04-17 PROCEDURE — 83690 ASSAY OF LIPASE: CPT | Performed by: PHYSICIAN ASSISTANT

## 2024-04-17 PROCEDURE — 74177 CT ABD & PELVIS W/CONTRAST: CPT

## 2024-04-17 PROCEDURE — 2550000001 HC RX 255 CONTRASTS: Performed by: PHYSICIAN ASSISTANT

## 2024-04-17 PROCEDURE — 84702 CHORIONIC GONADOTROPIN TEST: CPT | Performed by: PHYSICIAN ASSISTANT

## 2024-04-17 PROCEDURE — 85025 COMPLETE CBC W/AUTO DIFF WBC: CPT | Performed by: PHYSICIAN ASSISTANT

## 2024-04-17 PROCEDURE — 83880 ASSAY OF NATRIURETIC PEPTIDE: CPT | Performed by: PHYSICIAN ASSISTANT

## 2024-04-17 PROCEDURE — 84484 ASSAY OF TROPONIN QUANT: CPT | Performed by: PHYSICIAN ASSISTANT

## 2024-04-17 PROCEDURE — 94760 N-INVAS EAR/PLS OXIMETRY 1: CPT

## 2024-04-17 PROCEDURE — 99442 PR PHYS/QHP TELEPHONE EVALUATION 11-20 MIN: CPT | Performed by: STUDENT IN AN ORGANIZED HEALTH CARE EDUCATION/TRAINING PROGRAM

## 2024-04-17 PROCEDURE — 3008F BODY MASS INDEX DOCD: CPT | Performed by: STUDENT IN AN ORGANIZED HEALTH CARE EDUCATION/TRAINING PROGRAM

## 2024-04-17 PROCEDURE — 83605 ASSAY OF LACTIC ACID: CPT | Performed by: PHYSICIAN ASSISTANT

## 2024-04-17 PROCEDURE — 93005 ELECTROCARDIOGRAM TRACING: CPT

## 2024-04-17 PROCEDURE — 99285 EMERGENCY DEPT VISIT HI MDM: CPT | Mod: 25

## 2024-04-17 PROCEDURE — 83735 ASSAY OF MAGNESIUM: CPT | Performed by: PHYSICIAN ASSISTANT

## 2024-04-17 PROCEDURE — 1036F TOBACCO NON-USER: CPT | Performed by: STUDENT IN AN ORGANIZED HEALTH CARE EDUCATION/TRAINING PROGRAM

## 2024-04-17 RX ORDER — ONDANSETRON 4 MG/1
4 TABLET, ORALLY DISINTEGRATING ORAL EVERY 8 HOURS PRN
Qty: 9 TABLET | Refills: 0 | Status: SHIPPED | OUTPATIENT
Start: 2024-04-17 | End: 2024-04-20

## 2024-04-17 RX ORDER — AMOXICILLIN AND CLAVULANATE POTASSIUM 875; 125 MG/1; MG/1
1 TABLET, FILM COATED ORAL ONCE
Status: COMPLETED | OUTPATIENT
Start: 2024-04-17 | End: 2024-04-17

## 2024-04-17 RX ORDER — AMOXICILLIN AND CLAVULANATE POTASSIUM 875; 125 MG/1; MG/1
1 TABLET, FILM COATED ORAL EVERY 12 HOURS
Qty: 20 TABLET | Refills: 0 | Status: SHIPPED | OUTPATIENT
Start: 2024-04-17 | End: 2024-04-27

## 2024-04-17 RX ADMIN — Medication 2 L/MIN: at 18:50

## 2024-04-17 RX ADMIN — AMOXICILLIN AND CLAVULANATE POTASSIUM 1 TABLET: 875; 125 TABLET, FILM COATED ORAL at 22:39

## 2024-04-17 RX ADMIN — IOHEXOL 68 ML: 350 INJECTION, SOLUTION INTRAVENOUS at 21:14

## 2024-04-17 ASSESSMENT — ENCOUNTER SYMPTOMS
ARTHRALGIAS: 0
SORE THROAT: 0
EYE PAIN: 0
PALPITATIONS: 0
VOMITING: 1
SEIZURES: 0
ABDOMINAL PAIN: 1
NAUSEA: 1
COLOR CHANGE: 0
HEMATURIA: 0
DYSURIA: 0
CHILLS: 0
COUGH: 0
SHORTNESS OF BREATH: 1
FEVER: 0
BACK PAIN: 0

## 2024-04-17 ASSESSMENT — PAIN - FUNCTIONAL ASSESSMENT: PAIN_FUNCTIONAL_ASSESSMENT: 0-10

## 2024-04-17 ASSESSMENT — COLUMBIA-SUICIDE SEVERITY RATING SCALE - C-SSRS
6. HAVE YOU EVER DONE ANYTHING, STARTED TO DO ANYTHING, OR PREPARED TO DO ANYTHING TO END YOUR LIFE?: NO
1. IN THE PAST MONTH, HAVE YOU WISHED YOU WERE DEAD OR WISHED YOU COULD GO TO SLEEP AND NOT WAKE UP?: NO
2. HAVE YOU ACTUALLY HAD ANY THOUGHTS OF KILLING YOURSELF?: NO

## 2024-04-17 ASSESSMENT — PAIN SCALES - GENERAL: PAINLEVEL_OUTOF10: 3

## 2024-04-17 ASSESSMENT — PAIN DESCRIPTION - PAIN TYPE: TYPE: ACUTE PAIN

## 2024-04-17 NOTE — ED PROVIDER NOTES
Patient is a 39-year-old female with myasthenia gravis, pulmonary arterial hypertension, pulmonary emboli, recurrent DVTs who presents to the emergency room today for a chief complaint of dizziness and nausea and vomiting.  Patient states that she is constantly short of breath although her shortness of breath worsened this afternoon.  She states that yesterday she was nauseous and began vomiting.  She states that she has had upper respiratory symptoms and a cough.  She states that she has chest pain only when she is coughing.  She states that she has had a fever, more tired than normal.  She also reports some abdominal pain.            Review of Systems   Constitutional:  Negative for chills and fever.   HENT:  Negative for ear pain and sore throat.    Eyes:  Negative for pain and visual disturbance.   Respiratory:  Positive for shortness of breath. Negative for cough.    Cardiovascular:  Negative for chest pain and palpitations.   Gastrointestinal:  Positive for abdominal pain, nausea and vomiting.   Genitourinary:  Negative for dysuria and hematuria.   Musculoskeletal:  Negative for arthralgias and back pain.   Skin:  Negative for color change and rash.   Neurological:  Negative for seizures and syncope.   All other systems reviewed and are negative.       Physical Exam  Vitals and nursing note reviewed.   Constitutional:       General: She is not in acute distress.     Appearance: Normal appearance. She is well-developed.   HENT:      Head: Normocephalic and atraumatic.      Nose: Nose normal. No congestion or rhinorrhea.      Mouth/Throat:      Pharynx: No oropharyngeal exudate or posterior oropharyngeal erythema.   Eyes:      Extraocular Movements: Extraocular movements intact.      Conjunctiva/sclera: Conjunctivae normal.      Pupils: Pupils are equal, round, and reactive to light.   Cardiovascular:      Rate and Rhythm: Normal rate and regular rhythm.      Heart sounds: No murmur heard.  Pulmonary:       Effort: Pulmonary effort is normal. No respiratory distress.      Breath sounds: Normal breath sounds. No stridor. No wheezing or rhonchi.   Abdominal:      General: There is no distension.      Palpations: Abdomen is soft. There is no mass.      Tenderness: There is no abdominal tenderness. There is no guarding or rebound.      Hernia: No hernia is present.   Musculoskeletal:         General: No swelling, tenderness, deformity or signs of injury. Normal range of motion.      Cervical back: Normal range of motion and neck supple. No rigidity or tenderness.   Skin:     General: Skin is warm and dry.      Capillary Refill: Capillary refill takes less than 2 seconds.   Neurological:      General: No focal deficit present.      Mental Status: She is alert.   Psychiatric:         Mood and Affect: Mood normal.          Labs Reviewed   CBC WITH AUTO DIFFERENTIAL - Abnormal       Result Value    WBC 4.1 (*)     nRBC 0.0      RBC 4.51      Hemoglobin 13.3      Hematocrit 42.4      MCV 94      MCH 29.5      MCHC 31.4 (*)     RDW 14.4      Platelets 236      Neutrophils % 63.5      Immature Granulocytes %, Automated 0.2      Lymphocytes % 27.8      Monocytes % 8.1      Eosinophils % 0.2      Basophils % 0.2      Neutrophils Absolute 2.58      Immature Granulocytes Absolute, Automated 0.01      Lymphocytes Absolute 1.13 (*)     Monocytes Absolute 0.33      Eosinophils Absolute 0.01      Basophils Absolute 0.01     COMPREHENSIVE METABOLIC PANEL - Abnormal    Glucose 104 (*)     Sodium 136      Potassium 3.7      Chloride 102      Bicarbonate 28      Anion Gap 10      Urea Nitrogen 5 (*)     Creatinine 0.76      eGFR >90      Calcium 8.7      Albumin 4.0      Alkaline Phosphatase 86      Total Protein 6.8      AST 17      Bilirubin, Total 0.4      ALT 24     MAGNESIUM - Abnormal    Magnesium 1.50 (*)    PROTIME-INR - Abnormal    Protime 13.4 (*)     INR 1.2 (*)    APTT - Normal    aPTT 30      Narrative:     The APTT is no longer  used for monitoring Unfractionated Heparin Therapy. For monitoring Heparin Therapy, use the Heparin Assay.   HUMAN CHORIONIC GONADOTROPIN, SERUM QUANTITATIVE - Normal    HCG, Beta-Quantitative <2      Narrative:      Total HCG measurement is performed using the Ale[employer]rina Austin Access   Immunoassay which detects intact HCG and free beta HCG subunit.    This test is not indicated for use as a tumor marker.   HCG testing is performed using a different test methodology at [facility] than other [facility]. Direct result comparison   should only be made within the same method.       SARS-COV-2 AND INFLUENZA A/B PCR - Normal    Flu A Result Not Detected      Flu B Result Not Detected      Coronavirus 2019, PCR Not Detected      Narrative:     This assay has received FDA Emergency Use Authorization (EUA) and  is only authorized for the duration of time that circumstances exist to justify the authorization of the emergency use of in vitro diagnostic tests for the detection of SARS-CoV-2 virus and/or diagnosis of COVID-19 infection under section 564(b)(1) of the Act, 21 U.S.C. 360bbb-3(b)(1). Testing for SARS-CoV-2 is only recommended for patients who meet current clinical and/or epidemiological criteria as defined by federal, state, or local public health directives. This assay is an in vitro diagnostic nucleic acid amplification test for the qualitative detection of SARS-CoV-2, Influenza A, and Influenza B from nasopharyngeal specimens and has been validated for use at [facility]. Negative results do not preclude COVID-19 infections or Influenza A/B infections, and should not be used as the sole basis for diagnosis, treatment, or other management decisions. If Influenza A/B and RSV PCR results are negative, testing for Parainfluenza virus, Adenovirus and Metapneumovirus is routinely performed for [facility] pediatric oncology and intensive care inpatients, and is available on other  patients by placing an add-on request.    B-TYPE NATRIURETIC PEPTIDE - Normal    BNP 89      Narrative:        <100 pg/mL - Heart failure unlikely  100-299 pg/mL - Intermediate probability of acute heart                  failure exacerbation. Correlate with clinical                  context and patient history.    >=300 pg/mL - Heart Failure likely. Correlate with clinical                  context and patient history.    BNP testing is performed using different testing methodology at [facility] than at other [facility]. Direct result comparisons should only be made within the same method.      LIPASE - Normal    Lipase 11      Narrative:     Venipuncture immediately after or during the administration of Metamizole may lead to falsely low results. Testing should be performed immediately prior to Metamizole dosing.   SERIAL TROPONIN-INITIAL - Normal    Troponin I, High Sensitivity 9      Narrative:     Less than 99th percentile of normal range cutoff-  Female and children under 18 years old <14 ng/L; Male <21 ng/L: Negative  Repeat testing should be performed if clinically indicated.     Female and children under 18 years old 14-50 ng/L; Male 21-50 ng/L:  Consistent with possible cardiac damage and possible increased clinical   risk. Serial measurements may help to assess extent of myocardial damage.     >50 ng/L: Consistent with cardiac damage, increased clinical risk and  myocardial infarction. Serial measurements may help assess extent of   myocardial damage.      NOTE: Children less than 1 year old may have higher baseline troponin   levels and results should be interpreted in conjunction with the overall   clinical context.     NOTE: Troponin I testing is performed using a different   testing methodology at [facility] than at other   [facility]. Direct result comparisons should only   be made within the same method.   SERIAL TROPONIN, 1 HOUR - Normal    Troponin I, High  Sensitivity 7      Narrative:     Less than 99th percentile of normal range cutoff-  Female and children under 18 years old <14 ng/L; Male <21 ng/L: Negative  Repeat testing should be performed if clinically indicated.     Female and children under 18 years old 14-50 ng/L; Male 21-50 ng/L:  Consistent with possible cardiac damage and possible increased clinical   risk. Serial measurements may help to assess extent of myocardial damage.     >50 ng/L: Consistent with cardiac damage, increased clinical risk and  myocardial infarction. Serial measurements may help assess extent of   myocardial damage.      NOTE: Children less than 1 year old may have higher baseline troponin   levels and results should be interpreted in conjunction with the overall   clinical context.     NOTE: Troponin I testing is performed using a different   testing methodology at [facility] than at other   [facility]. Direct result comparisons should only   be made within the same method.   LACTATE - Normal    Lactate 1.0      Narrative:     Venipuncture immediately after or during the administration of Metamizole may lead to falsely low results. Testing should be performed immediately  prior to Metamizole dosing.   BLOOD CULTURE   BLOOD CULTURE   TROPONIN SERIES- (INITIAL, 1 HR)    Narrative:     The following orders were created for panel order Troponin I Series, High Sensitivity (0, 1 HR).  Procedure                               Abnormality         Status                     ---------                               -----------         ------                     Troponin I, High Sensiti...[[id number]]  Normal              Final result               Troponin, High Sensitivi...[[id number]]  Normal              Final result                 Please view results for these tests on the individual orders.   URINALYSIS WITH REFLEX CULTURE AND MICROSCOPIC    Narrative:     The following orders were created for panel order Urinalysis with  Reflex Culture and Microscopic.  Procedure                               Abnormality         Status                     ---------                               -----------         ------                     Urinalysis with Reflex C...[[id number]]                                                 Extra Urine Gray Tube[[id number]]                                                         Please view results for these tests on the individual orders.   URINALYSIS WITH REFLEX CULTURE AND MICROSCOPIC   EXTRA URINE GRAY TUBE        CT angio chest for pulmonary embolism   Final Result   1.  No evidence of pulmonary embolism or acute findings in the thorax.   2. Interval worsening pulmonary artery dilation since 2017 with   severe caliber attenuation of the left lobar pulmonary artery.             Signed by: Vicente Palumbo 4/17/2024 9:51 PM   Dictation workstation:   [id number]      CT abdomen pelvis w IV contrast   Final Result   1.  No acute findings in the abdomen and pelvis.   2. Hepatomegaly without focal lesion.   3. Bilateral ovarian cysts the larger on the right.   4. Multiple low-attenuation splenic lesions, likely cysts or   hemangiomas.             Signed by: Vicente Palumbo 4/17/2024 9:59 PM   Dictation workstation:   [id number]           ECG 12 Lead    Performed by: Maryann Gale PA-C  Authorized by: Maryann Gale PA-C    Comments:      EKG shows normal sinus rhythm with a rate of 70 bpm.  No ST elevation.  IL interval is 190 ms and QRS duration is 102 ms.       Medical Decision Making  Patient is a 39-year-old female who presents with a chief complaint of dizziness.  She has a history of myasthenia gravis, pulmonary arterial hypertension, previous history of pulmonary emboli and recurrent DVTs.  She is anticoagulated.  Today she presents with dizziness, nausea and vomiting along with chest pain worse with breathing and generalized abdominal pain.  COVID and influenza are unremarkable.  Initial  and 1 hour troponin are within normal limits.  Patient's WBC count is 4.1, this makes me somewhat suspicious for viral etiology.  This CT angio of the chest shows no evidence of PE.  There is interval worsening pulmonary artery dilation since 2017, discussed these findings with the patient, recommended she follow-up with her pulmonologist.  This can be worked up on an outpatient basis.  CT scan of the abdomen pelvis shows no acute findings.  There are bilateral ovarian cyst and low-attenuation splenic lesions likely cyst or meningiomas.  Was originally hypoxic when she arrived, was placed in bed.  Pulse ox was 87%.  Patient was placed on oxygen.  After further discussion patient states that she is on oxygen, 2 L nasal cannula in the evenings, does not use oxygen during the day.  She states that when she does exert herself her 6-minute walking test does cause a drop in her oxygen.  I did discuss this with the patient.  We removed the patient from oxygen and she was able to saturate without any difficulty around 95 to 96% on room air.  Patient and patient's significant other feel comfortable with the patient being discharged home.  Recommended follow-up with PCP and return for any new or worsening symptoms.    Amount and/or Complexity of Data Reviewed  Labs: ordered. Decision-making details documented in ED Course.  Radiology: ordered. Decision-making details documented in ED Course.  ECG/medicine tests: ordered and independent interpretation performed.         Diagnoses as of 04/17/24 2227   Dizziness   Nausea and vomiting, unspecified vomiting type   Acute sinusitis, recurrence not specified, unspecified location   Generalized body aches   Dyspnea, unspecified type   Acute abdominal pain                    Maryann Gale PA-C  04/17/24 2228

## 2024-04-17 NOTE — PROGRESS NOTES
Virtual or Telephone Consent    A telephone visit (audio only) between the patient (at the originating site) and the provider (at the distant site) was utilized to provide this telehealth service.   Verbal consent was requested and obtained from Alda Chaney on this date, 04/17/24 for a telehealth visit.     Chief complaint:   Follow up results       History of Present Illness  Alda Chaney is a 39 y.o. 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      Patient reports having since her right lower extremity DVT persistent right lower extremity swelling.  She also endorses venous claudication on the right leg.  She works as a  and reports the pain limits her regular work life, since she has to avoid going on longer walks due to leg discomfort.  She also reports that she definitely feels the pain more when she goes on her 6-minute walk test.  Reports she also has painful vulvar varicosities,  She is wearing compression stockings more regularly.  And she has been maintained on Xarelto 20 mg daily    Social History     Tobacco Use    Smoking status: Never     Passive exposure: Never    Smokeless tobacco: Never   Substance Use Topics    Alcohol use: Not Currently     Comment: Rarely    Drug use: Never       Outpatient Medications:  Current Outpatient Medications   Medication Instructions    acetaminophen (TylenoL) 325 mg tablet oral, TAKE 1 TO 2 TABLETS EVERY 4 HOURS AS NEEDED    albuterol 90 mcg/actuation inhaler 1 puff, inhalation, Every 4 hours PRN    budesonide-formoteroL (Symbicort) 160-4.5 mcg/actuation inhaler 2 puffs, inhalation, 2 times daily RT, Rinse mouth with water after use to reduce aftertaste and incidence of candidiasis. Do not swallow.    calcium carbonate-vitamin D3 600 mg-10 mcg (400 unit) tablet 2 tablets, oral, Daily RT    fexofenadine (ALLEGRA) 180 mg, oral, Daily     fluticasone (Flonase) 50 mcg/actuation nasal spray 1 spray, Each Nostril, Once as needed    fluticasone propion-salmeteroL (Advair Diskus) 500-50 mcg/dose diskus inhaler 1 puff, inhalation, 2 times daily RT, Rinse mouth with water after use to reduce aftertaste and incidence of candidiasis. Do not swallow.    loperamide (IMODIUM A-D) 2 mg, oral, 4 times daily PRN    macitentan (Opsumit) 10 mg tablet tablet 1 tablet, oral, Daily    montelukast (SINGULAIR) 10 mg, oral, Nightly    mycophenolate (CELLCEPT) 500 mg, oral, 2 times daily    pyridostigmine (Mestinon) 60 mg tablet 2 tablets, oral, Daily    selexipag (UPTRAVI) 400 mcg, oral, Daily    tadalafil (CIALIS) 40 mg, oral, Daily    Xarelto 20 mg, oral, Daily       Reviewed Study(s):    Venous reflux ultrasound from January 2, 2024  -Predominantly deep venous reflux  -Chronic changes on the popliteal to proximal femoral veins     Iliac duplex 12/19/2023  -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  Patient is symptomatic - VCSS score 10  -All ultrasound images reviewed.  CT abdomen pelvis with evidence of right iliac vein occlusion  -Patient benefits from intervention given symptoms.  Will evaluate for potential clinical trial enrollment  -Continue compression therapy  -Continue anticoagulation with Xarelto 20 mg daily    20 minutes in telephone visit    Jasmin Samuels MD [facility]  Interventional Cardiology  Endovascular Interventions  [email or web address]    **Disclaimer: This note was dictated by speech recognition, and every effort has been made to prevent any error in transcription, however minor errors may be present**

## 2024-04-17 NOTE — Clinical Note
Alda Chaney was seen and treated in our emergency department on 4/17/2024.  She may return to work on 04/21/2024.       If you have any questions or concerns, please don't hesitate to call.      Maryann Gale PA-C

## 2024-04-18 LAB
ATRIAL RATE: 70 BPM
P AXIS: 44 DEGREES
P OFFSET: 171 MS
P ONSET: 112 MS
PR INTERVAL: 190 MS
Q ONSET: 207 MS
QRS COUNT: 11 BEATS
QRS DURATION: 102 MS
QT INTERVAL: 380 MS
QTC CALCULATION(BAZETT): 410 MS
QTC FREDERICIA: 400 MS
R AXIS: 116 DEGREES
T AXIS: 56 DEGREES
T OFFSET: 397 MS
VENTRICULAR RATE: 70 BPM

## 2024-04-22 LAB
BACTERIA BLD CULT: NORMAL
BACTERIA BLD CULT: NORMAL

## 2024-05-02 ENCOUNTER — APPOINTMENT (OUTPATIENT)
Dept: PULMONOLOGY | Facility: HOSPITAL | Age: 39
End: 2024-05-02
Payer: COMMERCIAL

## 2024-05-02 ENCOUNTER — APPOINTMENT (OUTPATIENT)
Dept: CARDIOLOGY | Facility: HOSPITAL | Age: 39
End: 2024-05-02
Payer: COMMERCIAL

## 2024-05-02 ENCOUNTER — APPOINTMENT (OUTPATIENT)
Dept: RESPIRATORY THERAPY | Facility: HOSPITAL | Age: 39
End: 2024-05-02
Payer: COMMERCIAL

## 2024-05-08 ENCOUNTER — OFFICE VISIT (OUTPATIENT)
Dept: PRIMARY CARE | Facility: CLINIC | Age: 39
End: 2024-05-08
Payer: COMMERCIAL

## 2024-05-08 VITALS
HEART RATE: 61 BPM | BODY MASS INDEX: 35.61 KG/M2 | WEIGHT: 201 LBS | HEIGHT: 63 IN | DIASTOLIC BLOOD PRESSURE: 76 MMHG | SYSTOLIC BLOOD PRESSURE: 121 MMHG

## 2024-05-08 DIAGNOSIS — L23.9 ALLERGIC ECZEMA: ICD-10-CM

## 2024-05-08 DIAGNOSIS — G70.00 MYASTHENIA GRAVIS, ACETYLCHOLINE RECEPTOR ANTIBODY POSITIVE (MULTI): ICD-10-CM

## 2024-05-08 DIAGNOSIS — I87.001 POSTTHROMBOTIC SYNDROME OF RIGHT LOWER EXTREMITY: ICD-10-CM

## 2024-05-08 DIAGNOSIS — J45.30 MILD PERSISTENT ASTHMA, UNSPECIFIED WHETHER COMPLICATED (HHS-HCC): ICD-10-CM

## 2024-05-08 DIAGNOSIS — I27.24 CTEPH (CHRONIC THROMBOEMBOLIC PULMONARY HYPERTENSION) (MULTI): ICD-10-CM

## 2024-05-08 DIAGNOSIS — F41.9 ANXIETY DISORDER, UNSPECIFIED TYPE: ICD-10-CM

## 2024-05-08 DIAGNOSIS — Z86.718 HISTORY OF DVT (DEEP VEIN THROMBOSIS): ICD-10-CM

## 2024-05-08 DIAGNOSIS — D68.69 SECONDARY HYPERCOAGULABLE STATE (MULTI): ICD-10-CM

## 2024-05-08 DIAGNOSIS — I27.82 CHRONIC PULMONARY EMBOLISM, UNSPECIFIED PULMONARY EMBOLISM TYPE, UNSPECIFIED WHETHER ACUTE COR PULMONALE PRESENT (MULTI): ICD-10-CM

## 2024-05-08 DIAGNOSIS — R06.81 APNEA: Primary | ICD-10-CM

## 2024-05-08 DIAGNOSIS — K62.5 BRBPR (BRIGHT RED BLOOD PER RECTUM): ICD-10-CM

## 2024-05-08 DIAGNOSIS — Z99.81 DEPENDENCE ON SUPPLEMENTAL OXYGEN: ICD-10-CM

## 2024-05-08 PROCEDURE — 1036F TOBACCO NON-USER: CPT | Performed by: INTERNAL MEDICINE

## 2024-05-08 PROCEDURE — 99214 OFFICE O/P EST MOD 30 MIN: CPT | Performed by: INTERNAL MEDICINE

## 2024-05-08 RX ORDER — TRIAMCINOLONE ACETONIDE 1 MG/G
CREAM TOPICAL 2 TIMES DAILY
Qty: 30 G | Refills: 0 | Status: SHIPPED | OUTPATIENT
Start: 2024-05-08

## 2024-05-08 ASSESSMENT — ENCOUNTER SYMPTOMS
NAUSEA: 0
DIARRHEA: 0
SINUS PRESSURE: 0
VOMITING: 0
ACTIVITY CHANGE: 0
COUGH: 0
FATIGUE: 0
ABDOMINAL DISTENTION: 0
CHILLS: 0
NUMBNESS: 0
SHORTNESS OF BREATH: 0
BACK PAIN: 0
SINUS PAIN: 0
SORE THROAT: 0
APPETITE CHANGE: 0
WHEEZING: 0
WEAKNESS: 0

## 2024-05-08 NOTE — PROGRESS NOTES
"Subjective   Patient ID: Alda Chaney is a 39 y.o. female who presents for Follow-up (6 month).  HPI  Patient is here today for 6 mo follow up     Pt reports rash on hands that is very itchy. Been a few months. Started on left palm and now on right.     Review of Systems   Constitutional:  Negative for activity change, appetite change, chills and fatigue.   HENT:  Negative for congestion, postnasal drip, sinus pressure, sinus pain and sore throat.    Respiratory:  Negative for cough, shortness of breath and wheezing.    Cardiovascular:  Negative for chest pain and leg swelling.   Gastrointestinal:  Negative for abdominal distention, diarrhea, nausea and vomiting.   Musculoskeletal:  Negative for back pain.   Skin:  Positive for rash.   Neurological:  Negative for weakness and numbness.       Objective   /76   Pulse 61   Ht 1.6 m (5' 3\")   Wt 91.2 kg (201 lb)   BMI 35.61 kg/m²     Physical Exam  Constitutional:       General: She is not in acute distress.     Appearance: Normal appearance.   HENT:      Head: Normocephalic.      Nose: Nose normal.      Mouth/Throat:      Pharynx: No oropharyngeal exudate.   Eyes:      General:         Right eye: No discharge.         Left eye: No discharge.      Extraocular Movements: Extraocular movements intact.      Pupils: Pupils are equal, round, and reactive to light.   Cardiovascular:      Rate and Rhythm: Normal rate and regular rhythm.      Heart sounds: No murmur heard.     No gallop.   Pulmonary:      Effort: Pulmonary effort is normal. No respiratory distress.      Breath sounds: Normal breath sounds. No wheezing.   Abdominal:      General: Bowel sounds are normal. There is no distension.      Palpations: Abdomen is soft.      Tenderness: There is no abdominal tenderness.   Musculoskeletal:         General: No swelling. Normal range of motion.      Cervical back: Neck supple. No tenderness.   Skin:     General: Skin is warm and dry.      Coloration: Skin is not " jaundiced.   Neurological:      General: No focal deficit present.      Mental Status: She is alert and oriented to person, place, and time.      Cranial Nerves: No cranial nerve deficit.   Psychiatric:         Mood and Affect: Mood normal.         Behavior: Behavior normal.       Mammo 3/24   Pap 3/24   DEXA --   Colon cancer screening 2023     Assessment/Plan   Problem List Items Addressed This Visit       Asthma (HHS-HCC)    CTEPH (chronic thromboembolic pulmonary hypertension) (Multi)    Myasthenia gravis, acetylcholine receptor antibody positive (Multi)    Pulmonary embolism (Multi)    Secondary hypercoagulable state (Multi)    BRBPR (bright red blood per rectum)    RESOLVED: History of DVT (deep vein thrombosis)    Anxiety disorder    Postthrombotic syndrome of right lower extremity    Dependence on supplemental oxygen     Other Visit Diagnoses       Apnea    -  Primary    Relevant Orders    In-Center Sleep Study (Non-Sleep Provider Only)        Asthma   - continue albuterol inhaler prn   - continue Symbicort   - continue allegra     2. Recurrent DVT and PT with CTEPH   - following with vascular and pulmonology   - continue cellcept  - continue tadalafil   - xarelto   - Opsumit and uptravi  - 2L o2 prn and nocturnal     3. Apnea, snoring   - will order in lab sleep study with her other comorbidities including CTEPH certainly makes her higher risk for worsening of her pulmonary htn, if she has untreated CINDY. Pt has apnea and snoring, obesity, has never had a sleep study in the past.     Addendum   Pt needs pre-op clearance for uterine ablation. She had a pre-op EKG that showed a new finding (left fasicular block) compared to previous, with her history of CTEPH recommend that she be cleared by cardiology also, did see Cardiology. At this time pt is medically optimized and is low risk and may proceed with planned procedure. She is high risk for DVT, anticoagulation per Dr Samuels.     Final diagnoses:   [R06.81]  Apnea   [I27.24] CTEPH (chronic thromboembolic pulmonary hypertension) (Multi)   [Z86.718] History of DVT (deep vein thrombosis)   [I87.001] Postthrombotic syndrome of right lower extremity   [I27.82] Chronic pulmonary embolism, unspecified pulmonary embolism type, unspecified whether acute cor pulmonale present (Multi)   [D68.69] Secondary hypercoagulable state (Multi)   [K62.5] BRBPR (bright red blood per rectum)   [F41.9] Anxiety disorder, unspecified type   [G70.00] Myasthenia gravis, acetylcholine receptor antibody positive (Multi)   [Z99.81] Dependence on supplemental oxygen   [J45.30] Mild persistent asthma, unspecified whether complicated ([facility])

## 2024-05-14 ENCOUNTER — TELEPHONE (OUTPATIENT)
Dept: PRIMARY CARE | Facility: CLINIC | Age: 39
End: 2024-05-14
Payer: COMMERCIAL

## 2024-05-14 DIAGNOSIS — G43.109 COMPLICATED MIGRAINE: Primary | ICD-10-CM

## 2024-05-14 NOTE — TELEPHONE ENCOUNTER
Pt is looking to change her neurologist and wants to go to [place], Dr. Martínez, please place a new referral.  thanks  
Patient provided a pulse oximeter at discharge

## 2024-05-14 NOTE — TELEPHONE ENCOUNTER
Pt was just here on 5/8 and said she told you she was going to have a uteran ablation, she said Dr. Fuentes, [facility] wanted to know if you would put an addemdum on your visit stating she is ok to have this procedure done.  Please advise

## 2024-05-15 ENCOUNTER — DOCUMENTATION (OUTPATIENT)
Dept: PULMONOLOGY | Facility: HOSPITAL | Age: 39
End: 2024-05-15
Payer: COMMERCIAL

## 2024-05-15 NOTE — PROGRESS NOTES
Discussed with Dr. Curry patients upcoming uterine [facility] procedure [facility] in [facility] [place]. Per Dr. Curry, ok for patient to proceed with procedure.

## 2024-05-20 ENCOUNTER — OFFICE VISIT (OUTPATIENT)
Dept: CARDIOLOGY | Facility: CLINIC | Age: 39
End: 2024-05-20
Payer: COMMERCIAL

## 2024-05-20 VITALS
HEIGHT: 63 IN | HEART RATE: 67 BPM | WEIGHT: 201.1 LBS | SYSTOLIC BLOOD PRESSURE: 102 MMHG | DIASTOLIC BLOOD PRESSURE: 60 MMHG | OXYGEN SATURATION: 95 % | BODY MASS INDEX: 35.63 KG/M2

## 2024-05-20 DIAGNOSIS — Z01.810 PRE-OPERATIVE CARDIOVASCULAR EXAMINATION, HIGH RISK SURGERY: Primary | ICD-10-CM

## 2024-05-20 DIAGNOSIS — I27.24 CHRONIC THROMBOEMBOLIC PULMONARY HYPERTENSION (MULTI): ICD-10-CM

## 2024-05-20 DIAGNOSIS — I82.401 RECURRENT ACUTE DEEP VEIN THROMBOSIS (DVT) OF RIGHT LOWER EXTREMITY (MULTI): ICD-10-CM

## 2024-05-20 DIAGNOSIS — I87.001 POST-THROMBOTIC SYNDROME OF RIGHT LOWER EXTREMITY: ICD-10-CM

## 2024-05-20 PROCEDURE — 1036F TOBACCO NON-USER: CPT

## 2024-05-20 PROCEDURE — 99215 OFFICE O/P EST HI 40 MIN: CPT

## 2024-05-20 NOTE — PROGRESS NOTES
[facility]  Cardiology Clinic Office Note    Active Issues  This is a 39 y.o. 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 Samuels in this practice who presents to the clinic for preoperative risk stratification.     Patient had a virtual visit with Dr. Samuels on 4/17/2024 for right lower extremity posttraumatic syndrome for which she recommended to continue compression therapy and anticoagulation with Xarelto 20 mg daily.  She believe the patient would benefit from intervention given her symptoms and plan was to evaluate for potential clinical trial enrollment.    EKG April 17, 2024 shows normal sinus rhythm with a left posterior fascicular block and no acute ischemic changes.    Past Medical History  Past Medical History:   Diagnosis Date    History of DVT (deep vein thrombosis) 02/15/2012    Hyperopia of both eyes 12/30/2021    Other pulmonary embolism without acute cor pulmonale (Multi) 04/07/2020    Pulmonary embolism    Posterior vitreous detachment of both eyes 09/17/2019    Pulmonary embolism and infarction (Multi) 02/15/2012    Vitreous floater, bilateral 09/17/2019       Past Surgical History  Past Surgical History:   Procedure Laterality Date    CARDIAC CATHETERIZATION N/A 3/28/2024    Procedure: Right Heart Cath;  Surgeon: Santiago Dimas MD;  Location: [place] [address] Cardiac Cath Lab;  Service: Cardiovascular;  Laterality: N/A;    EYE SURGERY  08/28/2013    Eye Surgery    IR CVC TUNNELED  9/27/2016    IR CVC TUNNELED 9/27/2016 [facility] [place]    MOUTH SURGERY  08/28/2013    Oral Surgery Tooth Extraction    MR CHEST ANGIO W IV CONTRAST  1/23/2018    MR CHEST ANGIO W IV CONTRAST 1/23/2018 [facility] [place]    MR CHEST ANGIO W IV CONTRAST  9/28/2012    MR CHEST ANGIO W IV CONTRAST 9/28/2012 [facility] [place]    MR HEAD ANGIO W AND WO IV CONTRAST  4/16/2014    MR HEAD  ANGIO W AND WO IV CONTRAST 4/16/2014 [facility]CI[facility]    OTHER SURGICAL HISTORY  12/16/2022    Cardiac catheterization    OTHER SURGICAL HISTORY  02/09/2017    Sternotomy    THYMECTOMY  08/31/2017    Thymectomy       Medications  Current Outpatient Medications on File Prior to Visit   Medication Sig Dispense Refill    acetaminophen (TylenoL) 325 mg tablet Take by mouth. TAKE 1 TO 2 TABLETS EVERY 4 HOURS AS NEEDED      albuterol 90 mcg/actuation inhaler TAKE 1 PUFF BY MOUTH EVERY 4 HOURS AS NEEDED 8.5 g 11    budesonide-formoteroL (Symbicort) 160-4.5 mcg/actuation inhaler Inhale 2 puffs 2 times a day. Rinse mouth with water after use to reduce aftertaste and incidence of candidiasis. Do not swallow. 10.2 g 2    calcium carbonate-vitamin D3 600 mg-10 mcg (400 unit) tablet Take 2 tablets by mouth once daily.      fexofenadine (Allegra) 180 mg tablet Take 1 tablet (180 mg) by mouth once daily. 90 tablet 1    fluticasone (Flonase) 50 mcg/actuation nasal spray Administer 1 spray into each nostril 1 time if needed.      loperamide (Imodium A-D) 2 mg tablet Take 1 tablet (2 mg) by mouth 4 times a day as needed for diarrhea. 30 tablet 11    macitentan (Opsumit) 10 mg tablet tablet Take 1 tablet (10 mg) by mouth once daily.      montelukast (Singulair) 10 mg tablet Take 1 tablet (10 mg) by mouth once daily at bedtime.      mycophenolate (Cellcept) 500 mg tablet Take 1 tablet (500 mg) by mouth 2 times a day.      pyridostigmine (Mestinon) 60 mg tablet Take 2 tablets (120 mg) by mouth once daily.      selexipag (Uptravi) 200 mcg tablet Take 2 tablets (400 mcg) by mouth once daily.      tadalafil, antihypertensive, 20 mg tablet TAKE 2 TABLETS DAILY 60 tablet 2    triamcinolone (Kenalog) 0.1 % cream Apply topically 2 times a day. Apply to affected area 1-2 times daily as needed. Avoid face and groin. 30 g 0    Xarelto 20 mg tablet TAKE 1 TABLET BY MOUTH EVERY DAY 30 tablet 11     No current facility-administered medications  on file prior to visit.       Allergies  Allergies   Allergen Reactions    Bee Venom Protein (Honey Bee) Anaphylaxis    Ciprofloxacin Unknown    Levofloxacin Other     contraindicated with myesthenia gravis    unk    Magnesium Anaphylaxis    Magnesium Chloride Unknown    Magnesium Sulfate Unknown     patient notified that elevated Mg level could result in respiratory distress.  not immunologically allergic to magnesium.       Social History  Social History     Tobacco Use    Smoking status: Never     Passive exposure: Never    Smokeless tobacco: Never   Vaping Use    Vaping status: Never Used   Substance Use Topics    Alcohol use: Not Currently     Comment: Rarely    Drug use: Never       Family History  Family History   Problem Relation Name Age of Onset    Other (diabetes mellitus) Mother      Hypertension Mother      Heart attack Father      Other (diabetes mellitus) Father      Heart disease Father         VITALS  There were no vitals filed for this visit.    Weight  There were no vitals filed for this visit.    PHYSICAL EXAM  Physical Exam  Vitals and nursing note reviewed.   Constitutional:       General: She is not in acute distress.  HENT:      Head: Normocephalic and atraumatic.      Mouth/Throat:      Mouth: Mucous membranes are moist.      Pharynx: Oropharynx is clear.   Eyes:      General: No scleral icterus.     Pupils: Pupils are equal, round, and reactive to light.   Cardiovascular:      Rate and Rhythm: Normal rate and regular rhythm.      Chest Wall: PMI is not displaced.      Pulses: Normal pulses.      Heart sounds: Normal heart sounds. No murmur heard.     No friction rub.   Pulmonary:      Effort: Pulmonary effort is normal.      Breath sounds: Normal breath sounds.   Abdominal:      General: Bowel sounds are normal. There is no distension.      Palpations: Abdomen is soft.      Tenderness: There is no abdominal tenderness.   Musculoskeletal:         General: No swelling. Normal range of motion.       Cervical back: Normal range of motion and neck supple.      Right lower leg: Edema present.      Left lower leg: No edema.   Skin:     General: Skin is warm and dry.      Capillary Refill: Capillary refill takes less than 2 seconds.      Findings: No rash.   Neurological:      General: No focal deficit present.      Mental Status: She is alert.   Psychiatric:         Mood and Affect: Mood normal.         Behavior: Behavior normal.         Cardiovascular Imaging/Procedures/Labs  Lab Results   Component Value Date    HGB 13.3 04/17/2024    HGB 14.7 03/21/2024    HGB 13.6 01/24/2024     04/17/2024    WBC 4.1 (L) 04/17/2024     04/17/2024    K 3.7 04/17/2024    CREATININE 0.76 04/17/2024    CREATININE 0.63 03/28/2024    CREATININE 0.63 01/24/2024    BUN 5 (L) 04/17/2024    CALCIUM 8.7 04/17/2024    INR 1.2 (H) 04/17/2024    BNP 89 04/17/2024    TROPHS 7 04/17/2024    TROPHS 9 04/17/2024    TROPHS <3 01/24/2024       No echocardiogram results found for the past 12 months    ECG 12 Lead  Normal sinus rhythm  Left posterior fascicular block  Inferior infarct , age undetermined  T wave abnormality, consider anterior ischemia  Abnormal ECG  When compared with ECG of 24-JAN-2024 18:50,  QRS axis Shifted right  Inferior infarct is now Present  See ED provider note for full interpretation and clinical correlation  Confirmed by Maryann Fregoso ([id number]) on 4/18/2024 11:29:12 AM        Assessment and Plan    Preoperative cardiac risk stratification  -Patient states Dr. Norman want to be seen by cardiology prior to her uterine surgery due to an EKG on April 17, 2024 that showed a left posterior fascicular block.  Patient reassured that this is an incidental finding and likely related to some potential mild enlargement of heart chambers due to her pulmonary hypertension.  -She is a low cardiac risk for a high risk intra-abdominal uterine surgery.  However she is a high DVT risk.  Case discussed with  endovascular interventional cardiologist Dr. Jasmin Samuels who recommended that the patient may since discontinue her Xarelto 2 days prior to surgery and to restart it as soon as possible.  From a cardiac standpoint patient is optimized for surgery.    Right lower extremity post thrombotic syndrome  Recurrent right lower extremity DVT  Chronic thromboembolic pulmonary hypertension  -Continue compression therapy and anticoagulation with Xarelto 20 mg daily.  -Continue to follow with Dr. Samuels    RTC:  Follow-up with Dr. Samuels as needed    If your symptoms worsen or progress please go directory to your nearest emergency department for evaluation.     Thank you for this interesting clinical case and allowing me to participate in the care of this patient. Please reach me out if you have any questions or if you need any clarifications regarding this patient's care.    **Disclaimer: This note was dictated by speech recognition, and every effort has been made to prevent any error in transcription, however minor errors may be present**  ___________________________________________________  Santiago Tee, MSN, CNP, ACNPC, CCRN  Division of Cardiovascular Medicine  [facility]  [facility]

## 2024-05-30 ENCOUNTER — TELEPHONE (OUTPATIENT)
Dept: PRIMARY CARE | Facility: CLINIC | Age: 39
End: 2024-05-30
Payer: COMMERCIAL

## 2024-05-30 NOTE — TELEPHONE ENCOUNTER
Pt called in stating that she has now seen the cardiologist and was cleared. She was wondering if you could add an addendum to the note that she is cleared , she said nothing needs faxed whenever it is done we are to just let her know and she will call the office herself.     Please advise.

## 2024-06-05 ENCOUNTER — APPOINTMENT (OUTPATIENT)
Dept: SLEEP MEDICINE | Facility: CLINIC | Age: 39
End: 2024-06-05
Payer: COMMERCIAL

## 2024-06-18 ENCOUNTER — LAB (OUTPATIENT)
Dept: LAB | Facility: LAB | Age: 39
End: 2024-06-18
Payer: COMMERCIAL

## 2024-06-18 DIAGNOSIS — Z79.899 HIGH RISK MEDICATION USE: Primary | ICD-10-CM

## 2024-06-18 DIAGNOSIS — Z79.899 HIGH RISK MEDICATION USE: ICD-10-CM

## 2024-06-18 LAB — HCG UR QL IA.RAPID: NEGATIVE

## 2024-06-18 PROCEDURE — 81025 URINE PREGNANCY TEST: CPT

## 2024-06-26 ENCOUNTER — PREP FOR PROCEDURE (OUTPATIENT)
Dept: VASCULAR MEDICINE | Facility: CLINIC | Age: 39
End: 2024-06-26
Payer: COMMERCIAL

## 2024-06-26 DIAGNOSIS — I87.001 POST-THROMBOTIC SYNDROME OF RIGHT LOWER EXTREMITY: Primary | ICD-10-CM

## 2024-06-26 DIAGNOSIS — I87.001 POSTTHROMBOTIC SYNDROME OF RIGHT LOWER EXTREMITY: Primary | ICD-10-CM

## 2024-06-26 DIAGNOSIS — I82.511 CHRONIC DEEP VEIN THROMBOSIS (DVT) OF FEMORAL VEIN OF RIGHT LOWER EXTREMITY (MULTI): ICD-10-CM

## 2024-06-26 DIAGNOSIS — I87.002 POST-THROMBOTIC SYNDROME OF LEFT LOWER EXTREMITY: Primary | ICD-10-CM

## 2024-06-26 DIAGNOSIS — I27.24 CTEPH (CHRONIC THROMBOEMBOLIC PULMONARY HYPERTENSION) (MULTI): Primary | ICD-10-CM

## 2024-06-27 ENCOUNTER — HOSPITAL ENCOUNTER (OUTPATIENT)
Dept: VASCULAR MEDICINE | Facility: HOSPITAL | Age: 39
Discharge: HOME | End: 2024-06-27
Payer: COMMERCIAL

## 2024-06-27 ENCOUNTER — PRE-ADMISSION TESTING (OUTPATIENT)
Dept: PREADMISSION TESTING | Facility: HOSPITAL | Age: 39
End: 2024-06-27
Payer: COMMERCIAL

## 2024-06-27 VITALS
SYSTOLIC BLOOD PRESSURE: 108 MMHG | BODY MASS INDEX: 35.28 KG/M2 | TEMPERATURE: 97.7 F | HEIGHT: 63 IN | OXYGEN SATURATION: 96 % | WEIGHT: 199.1 LBS | DIASTOLIC BLOOD PRESSURE: 68 MMHG | HEART RATE: 71 BPM

## 2024-06-27 DIAGNOSIS — I87.001 POST-THROMBOTIC SYNDROME OF RIGHT LOWER EXTREMITY: ICD-10-CM

## 2024-06-27 DIAGNOSIS — Z01.818 ENCOUNTER FOR OTHER PREPROCEDURAL EXAMINATION: ICD-10-CM

## 2024-06-27 DIAGNOSIS — I87.001 POSTTHROMBOTIC SYNDROME OF RIGHT LOWER EXTREMITY: ICD-10-CM

## 2024-06-27 DIAGNOSIS — I82.511 CHRONIC DEEP VEIN THROMBOSIS (DVT) OF FEMORAL VEIN OF RIGHT LOWER EXTREMITY (MULTI): ICD-10-CM

## 2024-06-27 DIAGNOSIS — Z01.818 PREOP EXAMINATION: Primary | ICD-10-CM

## 2024-06-27 DIAGNOSIS — I27.24 CTEPH (CHRONIC THROMBOEMBOLIC PULMONARY HYPERTENSION) (MULTI): ICD-10-CM

## 2024-06-27 LAB
ABO GROUP (TYPE) IN BLOOD: NORMAL
ANION GAP SERPL CALC-SCNC: 15 MMOL/L (ref 10–20)
ANTIBODY SCREEN: NORMAL
APPEARANCE UR: ABNORMAL
APTT PPP: 28 SECONDS (ref 27–38)
BILIRUB UR STRIP.AUTO-MCNC: NEGATIVE MG/DL
BUN SERPL-MCNC: 14 MG/DL (ref 6–23)
CALCIUM SERPL-MCNC: 9.4 MG/DL (ref 8.6–10.6)
CHLORIDE SERPL-SCNC: 104 MMOL/L (ref 98–107)
CO2 SERPL-SCNC: 24 MMOL/L (ref 21–32)
COLOR UR: ABNORMAL
CREAT SERPL-MCNC: 0.57 MG/DL (ref 0.5–1.05)
EGFRCR SERPLBLD CKD-EPI 2021: >90 ML/MIN/1.73M*2
ERYTHROCYTE [DISTWIDTH] IN BLOOD BY AUTOMATED COUNT: 13.8 % (ref 11.5–14.5)
EST. AVERAGE GLUCOSE BLD GHB EST-MCNC: 108 MG/DL
GLUCOSE SERPL-MCNC: 114 MG/DL (ref 74–99)
GLUCOSE UR STRIP.AUTO-MCNC: NORMAL MG/DL
HBA1C MFR BLD: 5.4 %
HCT VFR BLD AUTO: 44 % (ref 36–46)
HGB BLD-MCNC: 14.3 G/DL (ref 12–16)
INR PPP: 1 (ref 0.9–1.1)
KETONES UR STRIP.AUTO-MCNC: NEGATIVE MG/DL
LEUKOCYTE ESTERASE UR QL STRIP.AUTO: ABNORMAL
MCH RBC QN AUTO: 29.5 PG (ref 26–34)
MCHC RBC AUTO-ENTMCNC: 32.5 G/DL (ref 32–36)
MCV RBC AUTO: 91 FL (ref 80–100)
MUCOUS THREADS #/AREA URNS AUTO: ABNORMAL /LPF
NITRITE UR QL STRIP.AUTO: NEGATIVE
NRBC BLD-RTO: 0 /100 WBCS (ref 0–0)
PH UR STRIP.AUTO: 5 [PH]
PLATELET # BLD AUTO: 292 X10*3/UL (ref 150–450)
POTASSIUM SERPL-SCNC: 4.2 MMOL/L (ref 3.5–5.3)
PROT UR STRIP.AUTO-MCNC: NEGATIVE MG/DL
PROTHROMBIN TIME: 11.6 SECONDS (ref 9.8–12.8)
RBC # BLD AUTO: 4.84 X10*6/UL (ref 4–5.2)
RBC # UR STRIP.AUTO: ABNORMAL /UL
RBC #/AREA URNS AUTO: ABNORMAL /HPF
RH FACTOR (ANTIGEN D): NORMAL
SODIUM SERPL-SCNC: 139 MMOL/L (ref 136–145)
SP GR UR STRIP.AUTO: 1.01
SQUAMOUS #/AREA URNS AUTO: ABNORMAL /HPF
UROBILINOGEN UR STRIP.AUTO-MCNC: NORMAL MG/DL
WBC # BLD AUTO: 6.3 X10*3/UL (ref 4.4–11.3)
WBC #/AREA URNS AUTO: ABNORMAL /HPF

## 2024-06-27 PROCEDURE — 93971 EXTREMITY STUDY: CPT

## 2024-06-27 PROCEDURE — 93971 EXTREMITY STUDY: CPT | Performed by: STUDENT IN AN ORGANIZED HEALTH CARE EDUCATION/TRAINING PROGRAM

## 2024-06-27 PROCEDURE — 85730 THROMBOPLASTIN TIME PARTIAL: CPT

## 2024-06-27 PROCEDURE — 87081 CULTURE SCREEN ONLY: CPT

## 2024-06-27 PROCEDURE — 85610 PROTHROMBIN TIME: CPT

## 2024-06-27 PROCEDURE — 80048 BASIC METABOLIC PNL TOTAL CA: CPT

## 2024-06-27 PROCEDURE — 86901 BLOOD TYPING SEROLOGIC RH(D): CPT

## 2024-06-27 PROCEDURE — 81001 URINALYSIS AUTO W/SCOPE: CPT

## 2024-06-27 PROCEDURE — 87086 URINE CULTURE/COLONY COUNT: CPT

## 2024-06-27 PROCEDURE — 83036 HEMOGLOBIN GLYCOSYLATED A1C: CPT

## 2024-06-27 PROCEDURE — 36415 COLL VENOUS BLD VENIPUNCTURE: CPT

## 2024-06-27 PROCEDURE — 99204 OFFICE O/P NEW MOD 45 MIN: CPT | Performed by: NURSE PRACTITIONER

## 2024-06-27 PROCEDURE — 85027 COMPLETE CBC AUTOMATED: CPT

## 2024-06-27 RX ORDER — CHLORHEXIDINE GLUCONATE 40 MG/ML
SOLUTION TOPICAL 2 TIMES DAILY
Qty: 473 ML | Refills: 0 | Status: SHIPPED | OUTPATIENT
Start: 2024-06-27 | End: 2024-07-02

## 2024-06-27 RX ORDER — CHLORHEXIDINE GLUCONATE ORAL RINSE 1.2 MG/ML
15 SOLUTION DENTAL SEE ADMIN INSTRUCTIONS
Qty: 473 ML | Refills: 0 | Status: SHIPPED | OUTPATIENT
Start: 2024-06-27

## 2024-06-27 ASSESSMENT — LIFESTYLE VARIABLES: SMOKING_STATUS: NONSMOKER

## 2024-06-27 ASSESSMENT — DUKE ACTIVITY SCORE INDEX (DASI)
CAN YOU DO YARD WORK LIKE RAKING LEAVES, WEEDING OR PUSHING A MOWER: YES
CAN YOU HAVE SEXUAL RELATIONS: YES
DASI METS SCORE: 7
CAN YOU WALK A BLOCK OR TWO ON LEVEL GROUND: YES
CAN YOU DO MODERATE WORK AROUND THE HOUSE LIKE VACUUMING, SWEEPING FLOORS OR CARRYING GROCERIES: YES
CAN YOU PARTICIPATE IN STRENOUS SPORTS LIKE SWIMMING, SINGLES TENNIS, FOOTBALL, BASKETBALL, OR SKIING: NO
CAN YOU TAKE CARE OF YOURSELF (EAT, DRESS, BATHE, OR USE TOILET): YES
CAN YOU DO HEAVY WORK AROUND THE HOUSE LIKE SCRUBBING FLOORS OR LIFTING AND MOVING HEAVY FURNITURE: NO
CAN YOU WALK INDOORS, SUCH AS AROUND YOUR HOUSE: YES
CAN YOU PARTICIPATE IN MODERATE RECREATIONAL ACTIVITIES LIKE GOLF, BOWLING, DANCING, DOUBLES TENNIS OR THROWING A BASEBALL OR FOOTBALL: YES
CAN YOU RUN A SHORT DISTANCE: NO
CAN YOU CLIMB A FLIGHT OF STAIRS OR WALK UP A HILL: YES
CAN YOU DO LIGHT WORK AROUND THE HOUSE LIKE DUSTING OR WASHING DISHES: YES
TOTAL_SCORE: 34.7

## 2024-06-27 ASSESSMENT — ENCOUNTER SYMPTOMS
MUSCULOSKELETAL NEGATIVE: 1
RESPIRATORY NEGATIVE: 1
FEVER: 0
NECK NEGATIVE: 1
CARDIOVASCULAR NEGATIVE: 1
EYES NEGATIVE: 1
CHILLS: 0
NEUROLOGICAL NEGATIVE: 1
CONSTITUTIONAL NEGATIVE: 1
GASTROINTESTINAL NEGATIVE: 1
ENDOCRINE NEGATIVE: 1

## 2024-06-27 ASSESSMENT — PAIN - FUNCTIONAL ASSESSMENT: PAIN_FUNCTIONAL_ASSESSMENT: 0-10

## 2024-06-27 ASSESSMENT — PAIN SCALES - GENERAL: PAINLEVEL_OUTOF10: 0 - NO PAIN

## 2024-06-27 NOTE — PREPROCEDURE INSTRUCTIONS
Thank you for visiting [facility] ([facility]) today for your pre-procedure evaluation, you were seen by    Sis Morfin, MSN, NP-C, CNP  Family Nurse Practitioner  Department of Anesthesiology and Perioperative Medicine  Main phone: [phone]  Fax :[phone]    **Please be sure to follow any guidance provided by your surgeon regarding foods, fluids and medications prior to surgery**    This summary includes instructions and information to aid you during your perioperative period.  Please read carefully. If you have any questions about your visit today, please call the number listed above.  If you become ill or have any changes to your health before your surgery, please contact your primary care provider and alert your surgeon.    Preparing for your Surgery       Exercises  Preoperative Deep Breathing Exercises  Why it is important to do deep breathing exercises before my surgery?  Deep breathing exercises strengthen your breathing muscles.  This helps you to recover after your surgery and decreases the chance of breathing complications.  How are the deep breathing exercises done?  Sit straight with your back supported.  Breathe in deeply and slowly through your nose. Your lower rib cage should expand and your abdomen may move forward.  Hold that breath for 3 to 5 seconds.  Breathe out through pursed lips, slowly and completely.  Rest and repeat 10 times every hour while awake.  Rest longer if you become dizzy or lightheaded.       Incentive Spirometer   You were provided with an incentive spirometer in [facility], please follow the below instructions.   You were not provided an incentive spirometer in [facility], please disregard the incentive spirometer instructions  What is an incentive spirometer?  An incentive spirometer is a device used before and after surgery to “exercise” your lungs.  It helps you to take deeper breaths to expand your lungs.  Below is an example of a basic incentive  spirometer.  The device you receive may differ slightly but they all function the same.    Why do I need to use an incentive spirometer?  Using your incentive spirometer prepares your lungs for surgery and helps prevent lung problems after surgery.  How do I use my incentive spirometer?  When you're using your incentive spirometer, make sure to breathe through your mouth. If you breathe through your nose, the incentive spirometer won't work properly. You can hold your nose if you have trouble.  If you feel dizzy at any time, stop and rest. Try again at a later time.  Follow the steps below:  Set up your incentive spirometer, expand the flexible tubing and connect to the outlet.  Sit upright in a chair or bed. Hold the incentive spirometer at eye level.   Put the mouthpiece in your mouth and close your lips tightly around it. Slowly breathe out (exhale) completely.  Breathe in (inhale) slowly through your mouth as deeply as you can. As you take a breath, you will see the piston rise inside the large column. While the piston rises, the indicator should move upwards. It should stay in between the 2 arrows (see Figure).  Try to get the piston as high as you can, while keeping the indicator between the arrows.   If the indicator doesn't stay between the arrows, you're breathing either too fast or too slow.  When you get it as high as you can, hold your breath for 10 seconds, or as long as possible. While you're holding your breath, the piston will slowly fall to the base of the spirometer.  Once the piston reaches the bottom of the spirometer, breathe out slowly through your mouth. Rest for a few seconds.  Repeat 10 times. Try to get the piston to the same level with each breath.  Repeat every hour while awake  You can carefully clean the outside of the mouthpiece with an alcohol wipe or soap and water.      Preoperative Brain Exercises    What are brain exercises?  A brain exercise is any activity that engages your  thinking (cognitive) skills.    What types of activities are considered brain exercises?  Jigsaw puzzles, crossword puzzles, word jumble, memory games, word search, and many more.  Many can be found free online or on your phone via a mobile jennifer.    Why should I do brain exercises before my surgery?  More recent research has shown brain exercise before surgery can lower the risk of postoperative delirium (confusion) which can be especially important for older adults.  Patients who did brain exercises for 5 to 10 hours the days before surgery, cut their risk of postoperative delirium in half up to 1 week after surgery.    Sit-to-Stand Exercise    What is the sit-to-stand exercise?  The sit-to-stand exercise strengthens the muscles of your lower body and muscles in the center of your body (core muscles for stability) helping to maintain and improve your strength and mobility.  How do I do the sit-to-stand exercise?  The goal is to do this exercise without using your arms or hands.  If this is too difficult, use your arms and hands or a chair with armrests to help slowly push yourself to the standing position and lower yourself back to the sitting position. As the movement becomes easier use your arms and hands less.    Steps to the sit-to-stand exercise  Sit up tall in a sturdy chair, knees bent, feet flat on the floor shoulder-width apart.  Shift your hips/pelvis forward in the chair to correctly position yourself for the next movement.  Lean forward at your hips.  Stand up straight putting equal weight on both feet.  Check to be sure you are properly aligned with the chair, in a slow controlled movement sit back down.  Repeat this exercise 10-15 times.  If needed you can do it fewer times until your strength improves.  Rest for 1 minute.  Do another 10-15 sit-to-stand exercises.  Try to do this in the morning and evening.        Instructions    Preoperative Fasting Guidelines    Why must I stop eating and drinking  near surgery time?  With sedation, food or liquid in your stomach can enter your lungs causing serious complications  Food can increase nausea and vomiting  When do I need to stop eating and drinking before my surgery?      Do not eat any food after midnight the night before your surgery/procedure. You may have up to 13.5 ounces of clear liquid until TWO hours before your instructed arrival time to the hospital.  This includes water, black tea/coffee, (no milk or cream) apple juice, and electrolyte drinks (Gatorade). You may chew gum until TWO hours before your surgery/procedure            Simple things you can do to help prevent blood clots     Blood clots are blockages that can form in the body's veins. When a blood clot forms in your deep veins, it may be called a deep vein thrombosis, or DVT for short. Blood clots can happen in any part of the body where blood flows, but they are most common in the arms and legs. If a piece of a blood clot breaks free and travels to the lungs, it is called a pulmonary embolus (PE). A PE can be a very serious problem.         Being in the hospital or having surgery can raise your chances of getting a blood clot because you may not be well enough to move around as much as you normally do.         Ways you can help prevent blood clots in the hospital       Wearing SCDs  SCDs stands for Sequential Compression Devices.   SCDs are special sleeves that wrap around your legs. They attach to a pump that fills them with air to gently squeeze your legs every few minutes.  This helps return the blood in your legs to your heart.   SCDs should only be taken off when walking or bathing. SCDs may not be comfortable, but they can help save your life.              Pump SCD leg sleeves  Wearing compression stockings - if your doctor orders them. These special snug-fitting stockings gently squeeze your legs to help blood flow.       Walking. Walking helps move the blood in your legs.   If your  doctor says it is ok, try walking the halls at least   5 times a day. Ask us to help you get up, so you don't fall.      Taking any blood-thinning medicines your doctor orders.              Ways you can help prevent blood clots at home         Wearing compression stockings - if your doctor orders them.   Walking - to help move the blood in your legs.    Taking any blood-thinning medicines your doctor orders.      Signs of a blood clot or PE    Tell your doctor or nurse right away if you have any of the problems listed below.         If you are at home, seek medical care right away. Call 911 for chest pain or problems breathing.            Signs of a blood clot (DVT) - such as pain, swelling, redness, or warmth in your arm or legs.  Signs of a pulmonary embolism (PE) - such as chest pain or feeling short of breath      Tobacco and Alcohol;  Do not drink alcohol or smoke within 24 hours of surgery.  It is best to quit smoking for as long as possible before any surgery or procedure.          The Week before Surgery        Seven days before Surgery  Check your [facility] medication instructions  Do the exercises provided to you by CPM   Arrange for a responsible, adult licensed  to take you home after surgery and stay with you for 24 hours.  You will not be permitted to drive yourself home if you have received any anesthetic/sedation  Six days before surgery  Check your CPM medication instructions  Do the exercises provided to you by [facility]   Start using Chlorhexidene (CHG) body wash if prescribed  Five days before surgery  Check your [facility] medication instructions  Do the exercises provided to you by CPM   Continue to use CHG body wash if prescribed  Three days before surgery  Check your CPM medication instructions  Do the exercises provided to you by CPM   Continue to use CHG body wash if prescribed  Two days before surgery  Check your CPM medication instructions  Do the exercises provided to you by CPM   Continue to use CHG  body wash if prescribed    The Day before Surgery       Check your CPM medication and all other CPM instructions including when to stop eating and drinking  You will be called with your arrival time for surgery in the late afternoon.  If you do not receive a call please reach out to your surgeon's office.  Do not smoke or drink 24 hours before surgery  Prepare items to bring with you to the hospital  Shower with your chlorhexidine wash if prescribed  Brush your teeth and use your chlorhexidine dental rinse if prescribed    The Day of Surgery       Check your CPM medication instructions  Ensure you follow the instructions for when to stop eating and drinking  Shower, if prescribed use CHG.  Do not apply any lotions, creams, moisturizers, perfume or deodorant  Brush your teeth and use your CHG dental rinse if prescribed  Wear loose comfortable clothing  Avoid make-up  Remove  jewelry and piercings, consider professional piercing removal with a plastic spacer if needed  Bring photo ID and Insurance card  Bring an accurate medication list that includes medication dose, frequency and allergies  Bring a copy of your advanced directives (will, health care power of )  Bring any devices and controllers as well as medical devices you have been provided with for surgery (CPAP, slings, braces, etc.)  Dentures, eyeglasses, and contacts will be removed before surgery, please bring cases for contacts or glasses

## 2024-06-27 NOTE — CPM/PAT H&P
CPM/PAT Evaluation       Name: Alda Chaney (Alda Chaney)  /Age: 1985/39 y.o.     Visit Type:   In-Person       Chief Complaint: RIGHT LEG venogram, ivus, possible stenting, possible jugular approach - Right     HPI  Patient is a 39 year old female scheduled for surgery with Dr. Khalida Caldera on 24 related to Post-thrombotic syndrome of right lower extremity     Patient referred by Dr. Caldera for preoperative evaluation of myasthenia gravis, asthma, CTEPH, history of DVT/PE    Past Medical History:   Diagnosis Date    Asthma ([facility])     CTEPH (chronic thromboembolic pulmonary hypertension) (Multi)     History of DVT (deep vein thrombosis) 02/15/2012    Hyperopia of both eyes 2021    Myasthenia gravis, acetylcholine receptor antibody positive (Multi)     Other pulmonary embolism without acute cor pulmonale (Multi) 2020    Pulmonary embolism    Personal history of DVT (deep vein thrombosis)     Posterior vitreous detachment of both eyes 2019    Pulmonary embolism and infarction (Multi) 02/15/2012    Vitreous floater, bilateral 2019     Past Surgical History:   Procedure Laterality Date    CARDIAC CATHETERIZATION N/A 2024    Procedure: Right Heart Cath;  Surgeon: Santiago Dimas MD;  Location: [place] [address] Cardiac Cath Lab;  Service: Cardiovascular;  Laterality: N/A;    DILATION AND CURETTAGE OF UTERUS  2024    ENDOMETRIAL ABLATION  2024    EYE SURGERY  2013    Eye Surgery    IR CVC TUNNELED  2016    IR CVC TUNNELED 2016 [facility] INPATIENT LEGACY    MOUTH SURGERY  2013    Oral Surgery Tooth Extraction    MR CHEST ANGIO W IV CONTRAST  2018    MR CHEST ANGIO W IV CONTRAST 2018 [facility] ANCILLARY LEGACY    MR CHEST ANGIO W IV CONTRAST  2012    MR CHEST ANGIO W IV CONTRAST 2012 [facility] ANCILLARY LEGACY    MR HEAD ANGIO W AND WO IV CONTRAST  2014    MR HEAD ANGIO W AND WO IV CONTRAST 2014 [facility] ANCILLARY LEGACY    OTHER  SURGICAL HISTORY  12/16/2022    Cardiac catheterization    OTHER SURGICAL HISTORY  02/09/2017    Sternotomy    THYMECTOMY  08/31/2017    Thymectomy     Family History   Problem Relation Name Age of Onset    Other (diabetes mellitus) Mother      Hypertension Mother      Cervical cancer Mother      Heart attack Father      Other (diabetes mellitus) Father      Heart disease Father      Other (Adopted) Father       Allergies   Allergen Reactions    Bee Venom Protein (Honey Bee) Anaphylaxis    Ciprofloxacin Unknown    Levofloxacin Other     contraindicated with myesthenia gravis    unk    Magnesium Anaphylaxis    Magnesium Chloride Unknown    Magnesium Sulfate Unknown     patient notified that elevated Mg level could result in respiratory distress.  not immunologically allergic to magnesium.     Prior to Admission medications    Medication Sig Start Date End Date Taking? Authorizing Provider   acetaminophen (TylenoL) 325 mg tablet Take by mouth. TAKE 1 TO 2 TABLETS EVERY 4 HOURS AS NEEDED    Historical Provider, MD   albuterol 90 mcg/actuation inhaler TAKE 1 PUFF BY MOUTH EVERY 4 HOURS AS NEEDED 12/30/23   Joe Curry DO   budesonide-formoteroL (Symbicort) 160-4.5 mcg/actuation inhaler Inhale 2 puffs 2 times a day. Rinse mouth with water after use to reduce aftertaste and incidence of candidiasis. Do not swallow. 12/21/23   Milagros Norman DO   calcium carbonate-vitamin D3 600 mg-10 mcg (400 unit) tablet Take 2 tablets by mouth once daily.    Historical Provider, MD   fexofenadine (Allegra) 180 mg tablet Take 1 tablet (180 mg) by mouth once daily. 11/8/23 11/7/24  Milagros Nroman DO   fluticasone (Flonase) 50 mcg/actuation nasal spray Administer 1 spray into each nostril 1 time if needed. 9/22/14   Historical Provider, MD   loperamide (Imodium A-D) 2 mg tablet Take 1 tablet (2 mg) by mouth 4 times a day as needed for diarrhea. 1/15/24 1/14/25  Joe Curry DO   macitentan (Opsumit) 10 mg tablet tablet  Take 1 tablet (10 mg) by mouth once daily. 11/30/19   Historical Provider, MD   montelukast (Singulair) 10 mg tablet Take 1 tablet (10 mg) by mouth once daily at bedtime.    Historical Provider, MD   mycophenolate (Cellcept) 500 mg tablet Take 1 tablet (500 mg) by mouth 2 times a day.    Historical Provider, MD   pyridostigmine (Mestinon) 60 mg tablet Take 2 tablets (120 mg) by mouth once daily. 1/3/13   Historical Provider, MD   selexipag (Uptravi) 200 mcg tablet Take 2 tablets (400 mcg) by mouth once daily. 7/16/20   Historical Provider, MD   tadalafil, antihypertensive, 20 mg tablet TAKE 2 TABLETS DAILY 3/13/24   Joe Curry,    triamcinolone (Kenalog) 0.1 % cream Apply topically 2 times a day. Apply to affected area 1-2 times daily as needed. Avoid face and groin. 5/8/24   Milagros Norman DO   Xarelto 20 mg tablet TAKE 1 TABLET BY MOUTH EVERY DAY 12/28/23   Donaldo Killian PA-C      PAT ROS:   Constitutional:   neg     no fever   no chills  Neuro/Psych:   neg    Eyes:   neg    Ears:   neg    Nose:   neg    Mouth:   neg    Throat:   neg    Neck:   neg    Cardio:   neg    Respiratory:   neg    Endocrine:   neg    GI:   neg    :   neg    Musculoskeletal:   neg    Hematologic:   neg     no history of blood transfusion   no blood clots  Skin:  neg      Physical Exam  Vitals reviewed.   Constitutional:       Appearance: Normal appearance. She is normal weight.   HENT:      Head: Normocephalic and atraumatic.      Nose: Nose normal.      Mouth/Throat:      Mouth: Mucous membranes are moist.      Pharynx: Oropharynx is clear.   Eyes:      Extraocular Movements: Extraocular movements intact.      Pupils: Pupils are equal, round, and reactive to light.   Cardiovascular:      Rate and Rhythm: Normal rate and regular rhythm.      Heart sounds: Normal heart sounds.   Pulmonary:      Effort: Pulmonary effort is normal.      Breath sounds: Normal breath sounds.   Abdominal:      General: Abdomen is flat. Bowel  "sounds are normal.      Palpations: Abdomen is soft.   Musculoskeletal:         General: Normal range of motion.      Cervical back: Normal range of motion and neck supple.   Skin:     General: Skin is warm and dry.   Neurological:      Mental Status: She is alert and oriented to person, place, and time.   Psychiatric:         Mood and Affect: Mood normal.         Behavior: Behavior normal.         Thought Content: Thought content normal.         Judgment: Judgment normal.        PAT AIRWAY:   Airway:     Mallampati::  II    Neck ROM::  Full   States nothing loose or removable          Visit Vitals  /68   Pulse 71   Temp 36.5 °C (97.7 °F) (Oral)     Visit Vitals  /68   Pulse 71   Temp 36.5 °C (97.7 °F) (Oral)   Ht 1.6 m (5' 3\")   Wt 90.3 kg (199 lb 1.6 oz)   SpO2 96%   BMI 35.27 kg/m²   Smoking Status Never   BSA 2 m²     DASI Risk Score      Flowsheet Row Most Recent Value   DASI SCORE 34.7   METS Score (Will be calculated only when all the questions are answered) 7          Caprini DVT Assessment      Flowsheet Row Most Recent Value   DVT Score 10   Current Status Major surgery planned, lasting 2-3 hours   History SVT, DVT/PE   Age 40-59 years   BMI 31-40 (Obesity)          Modified Frailty Index      Flowsheet Row Most Recent Value   Modified Frailty Index Calculator 0          CHADS2 Stroke Risk  Current as of 18 minutes ago        N/A 3 to 100%: High Risk   2 to < 3%: Medium Risk   0 to < 2%: Low Risk     Last Change: N/A          This score determines the patient's risk of having a stroke if the patient has atrial fibrillation.        This score is not applicable to this patient. Components are not calculated.          Revised Cardiac Risk Index      Flowsheet Row Most Recent Value   Revised Cardiac Risk Calculator 1          Apfel Simplified Score      Flowsheet Row Most Recent Value   Apfel Simplified Score Calculator 3          Risk Analysis Index Results This Encounter    No data found in the " last 1 encounters.       Stop Bang Score      Flowsheet Row Most Recent Value   Do you snore loudly? 0   Do you often feel tired or fatigued after your sleep? 0   Has anyone ever observed you stop breathing in your sleep? 0   Do you have or are you being treated for high blood pressure? 0   Recent BMI (Calculated) 35.6   Is BMI greater than 35 kg/m2? 1=Yes   Age older than 50 years old? 0=No   Is your neck circumference greater than 17 inches (Male) or 16 inches (Female)? 0   Gender - Male 0=No   STOP-BANG Total Score 1          Assessment and Plan:     Anesthesia:     Patient is concerned about being fully awake for procedure. States she had a traumatic experience in the past and has since had issues with central lines.     Neuro:   The patient is at an increased risk for post operative delirium secondary to type and duration of surgery     The patient is at an increased risk for perioperative stroke secondary to preoperative interrupton of antithrombotic, female, vascular surgery, general anesthesia, and op time >2.5 hours    Patient given information on brain exercises and delirium prevention.    Myasthenia gravis  Currently treating with pyridostigmine (Mestinon)     Follows with Dr. Sam Martínez at [facility]. Per note 6-11-24:     Assessment and Plan:  Alda Chaney is a pleasant 39 y.o. female with a history of antibody positive, generalized myasthenia gravis, in remission s/p thymectomy 2017 who is referred for surgical clearance before uterine ablation.      She denies MG symptoms, but there may be some subtle signs of NMJ dysfunction (head tilted back, mild intermittent OUSMANE).       She is cleared to proceed with surgery with the following recommendations:  Pretreat with pyridostigmine 60 mg bid 3 days before and after surgery  Avoid neuromuscular blocking agents before or during surgery     She was also referred by her PCP for migraine headaches, not currently a problem.   Will refill Nurtec for rescue therapy as this has been effective in the past.      Alda was seen today for new patient.     Diagnoses and all orders for this visit:     Myasthenia gravis  -     pyRIDostigmine 60 MG tablet; Take 1 tablet by mouth 2 times daily. Spread throughout the day     Episodic migraine  -     Rimegepant Sulfate (Nurtec) 75 MG Tab Dispersible; Take 1 tablet at the onset of migraine.  Do not take more than 1 tablet in 24 hours.     HEENT/Airway  No diagnosis or significant findings on chart review or clinical presentation and evaluation.    Cardiovascular  No diagnosis or significant findings on chart review or clinical presentation and evaluation.    CARDS EVAL  The patient follows with cardiology, Dr. Jasmin Rosenberg. Patient was last seen 3-27-24. Per note  Assessment/Plan      # Right lower extremity postthrombotic syndrome  -Discussed with patient at length.  Patient is symptomatic - VCSS score 10  -All ultrasound images reviewed.  Will obtain CT abdomen pelvis with contrast to further evaluation for potential intervention  -Continue compression therapy  -Continue anticoagulation with Xarelto 20 mg daily     RCRI  The patient meets 0-1 RCRI criteria and therefore has a less than 1% risk of major adverse cardiac complications.  METS  The patient's functional capacity is greater than 4 METS.  MACE  30-day risk for MACE of 1 predictor, 6.0% risk for cardiac death, nonfatal myocardial infarction, and nonfatal cardiac arrest  RIANA  0.1% risk for 26th to 50th percentile    4-17-24: EKG sinus rhythm left fascicular block    3-28-24: Cardiac cath  CONCLUSIONS:   1. Mild to moderate pulmonary hypertension.    6-27-24: Vascular US Lower Extremity Venous Insufficiency Right   CONCLUSIONS:  Right Lower Venous Insufficiency: There is reflux noted in the mid femoral vein.  Right Lower Venous: There are chronic changes visualized in the distal external iliac, common femoral, proximal femoral, mid  femoral and distal Femoral veins.  Right Lower Vein Mapping: Right lower extremity vein: CFV diameter 8.08mm depth 17.8mm, profunda vein prox diameter 5.88mm depth 31.0mm, mid diameter 4.18mm depth 43.2mm, femoral vein prox diameter 3.84mm depth 26.5mm, mid diameter 3.81mm depth 36.6mm, distal diameter 7.57mm depth 40.4. Known chronic disease.     Imaging & Doppler Findings:     Right Compress Thrombus  Diam  SFJ     Yes      None   4.9 mm    Pulmonary     Asthma  Currently treated with montelukast, albuterol and Symbicort. Also takes fexofenadine and fluticasone for allergies    CTEPH  Currently taking tadalafil, Opsumit, and Uptravi. Does have oxygen that she can use PRN.       Currently following with Dr. Joe Curry. Per note 1-15-24:  1) Pulmonary   a. CTED limited mostly to right lung - near normal RV - unchanged exercise ability FC II. Known previous desat with significant exercise, prescribed oxygen, will not use. 7/2015 stress echo with maintained RVSP , TAPSE and RV  size at peak exertion. Echo 10/23/2017 near normal to mild RV, likely unchanged. Cath last year Jan 2017 with resting mPAP of 35 mm Hg. 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 Uptravi started, significant side effects because she inappropriately escalated dosing without reviewing or contacting physician office as instructed. we then slowly increase dose and only change after direct consultation with pulmonary vascular office.      1/10/2022 - taking meds, no edema tolerating 400/400 ug selexipag.      9/12/2022 - stopped taking PH meds because she is in [place]. Still taking anticoagulation. Echo unchanged with muscular RV but normal  "size, unchanged compared to 2021.     4/3/2023 - Last seen September 2022 with normal echo but had interrupted meds due to insurance issues. She has not worked to clear this up but did not tell us. Today has insurance approval but has not restarted. Will restart. triple therapy sequentially.      9/25/2023 - (+) 9 m , unchanged drop in SpO2 to 89%, using meds, resistant to uptitration of selexipag due to work.     1/15/2024 - Specialty pharmacy claims no delivery since July 2023 for Uptravi. Patient claims she has significant previously delivered drug which she is using, this is possible given dose, etc. Patient unable to uptitrate past 600 BID due to diarrhea, headache. May have to decrease to 600/400 unless sx decrease over next 1-2 weeks. Walk similar but desaturation to 86%, recommend use of ambulatory oxygen.      b. Desaturation due to CTED and dead space- still SpO2 = 91% on room air.      c. 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 This Limits possibilities for PTE. Angio 4/2019 suggested improvement of this flow and patient underwent BPA with significant subjective improvement which has now waned. Unclear if this is due to restenosis or ?     d. Previously INcreasing wheezing and allergy symptoms, patient has had albuterol inhaler for long time but very, very rarely. Now using regular symbicort, no nocturnal symptoms.      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      3) Obesity with increased weight, BMI = 35.5-> 36.9->37-.37.6     4) COVID infection, minor dyspnea, no oxygen , stayed home, no CXR feels \"normal\" now.    Plan    1) Echo next visit 3-4 mo with 6MW cath and echo before.   2) Continue anticoagulation.   3) Weight loss.   4) COVID vaccination again recommended.  5) Continue tadalafil and macitentan  6) No uptitration of selexipag at this time given symptoms. currently at 600/600. May need to decrease to " "previous 600/400 unless side effects settle down.     STOP BANG Score of 1, which places patient at low risk for having CINDY.  ARISCAT 23, low, 1.6% risk of in-hospital postoperative pulmonary complications  PRODIGY 3, low of respiratory depression episode.    Patient given information on deep breathing exercises as well as incentive spirometer.     Renal  No diagnosis or significant findings on chart review or clinical presentation and evaluation.    Endocrine  No diagnosis or significant findings on chart review or clinical presentation and evaluation.    Hematology    History of DVT/PE  Currently taking Xarelto    Per message received from Dr. Caldera,  \"please be sure to let NP/MD seeing her know that it is ok for patient to take her Xarelto on Sunday am the day prior to surgery 7/7 only needs to hold the day of surgery on 7/8.  UNLESS she takes in the evening, in which case she should hold beginning 7/6. I did leave patient message regarding this yesterday\".      Antiplatelet management   The patient is not currently receiving antiplatelet therapy.  Anticoagulation management  The patient is currently receiving anticoagulation therapy for history of DVT/PE.  Caprini score 10, high risk of VTE    Transfusion Evaluation  A type and screen was obtained given the likelihood for perioperative transfusion of blood or blood products.    Patient given information on DVT prevention.     GI  No diagnosis or significant findings on chart review or clinical presentation and evaluation.    Eat 10- 0  Apfel: 3 points 61% risk for post operative nausea/vomiting.     Genitourinary  No diagnosis or significant findings on chart review or clinical presentation and evaluation.    ID  No diagnosis or significant findings on chart review or clinical presentation and evaluation.    MRSA swab ordered  UA with reflex culture ordered  Chlorhexidine mouth wash and body wash ordered    Musculoskeletal  No diagnosis or significant findings " on chart review or clinical presentation and evaluation.    -Preoperative medication instructions were provided and reviewed with the patient.  Any additional testing or evaluation was explained to the patient.  NPO Instructions were discussed, and the patient's questions were answered prior to conclusion of this encounter    Labs ordered:    Recent Results (from the past 168 hour(s))   CBC    Collection Time: 06/27/24  3:54 PM   Result Value Ref Range    WBC 6.3 4.4 - 11.3 x10*3/uL    nRBC 0.0 0.0 - 0.0 /100 WBCs    RBC 4.84 4.00 - 5.20 x10*6/uL    Hemoglobin 14.3 12.0 - 16.0 g/dL    Hematocrit 44.0 36.0 - 46.0 %    MCV 91 80 - 100 fL    MCH 29.5 26.0 - 34.0 pg    MCHC 32.5 32.0 - 36.0 g/dL    RDW 13.8 11.5 - 14.5 %    Platelets 292 150 - 450 x10*3/uL   Coagulation Screen    Collection Time: 06/27/24  3:54 PM   Result Value Ref Range    Protime 11.6 9.8 - 12.8 seconds    INR 1.0 0.9 - 1.1    aPTT 28 27 - 38 seconds   Type And Screen    Collection Time: 06/27/24  3:54 PM   Result Value Ref Range    ABO TYPE A     Rh TYPE POS     ANTIBODY SCREEN NEG    Hemoglobin A1C    Collection Time: 06/27/24  3:54 PM   Result Value Ref Range    Hemoglobin A1C 5.4 see below %    Estimated Average Glucose 108 Not Established mg/dL   Staphylococcus aureus/MRSA colonization, Culture    Collection Time: 06/27/24  3:54 PM    Specimen: Nares/Axilla/Groin; Swab   Result Value Ref Range    Staph/MRSA Screen Culture No Staphylococcus aureus isolated    Urinalysis with Reflex Culture and Microscopic    Collection Time: 06/27/24  3:54 PM   Result Value Ref Range    Color, Urine Light-Yellow Light-Yellow, Yellow, Dark-Yellow    Appearance, Urine Turbid (N) Clear    Specific Gravity, Urine 1.014 1.005 - 1.035    pH, Urine 5.0 5.0, 5.5, 6.0, 6.5, 7.0, 7.5, 8.0    Protein, Urine NEGATIVE NEGATIVE, 10 (TRACE), 20 (TRACE) mg/dL    Glucose, Urine Normal Normal mg/dL    Blood, Urine 0.1 (1+) (A) NEGATIVE    Ketones, Urine NEGATIVE NEGATIVE mg/dL     Bilirubin, Urine NEGATIVE NEGATIVE    Urobilinogen, Urine Normal Normal mg/dL    Nitrite, Urine NEGATIVE NEGATIVE    Leukocyte Esterase, Urine 500 Chano/µL (A) NEGATIVE   Extra Urine Gray Tube    Collection Time: 06/27/24  3:54 PM   Result Value Ref Range    Extra Tube Hold for add-ons.    Microscopic Only, Urine    Collection Time: 06/27/24  3:54 PM   Result Value Ref Range    WBC, Urine 21-50 (A) 1-5, NONE /HPF    RBC, Urine 6-10 (A) NONE, 1-2, 3-5 /HPF    Squamous Epithelial Cells, Urine 1-9 (SPARSE) Reference range not established. /HPF    Mucus, Urine FEW Reference range not established. /LPF   Urine Culture    Collection Time: 06/27/24  3:54 PM    Specimen: Clean Catch/Voided; Urine   Result Value Ref Range    Urine Culture No significant growth    Basic metabolic panel    Collection Time: 06/27/24  3:57 PM   Result Value Ref Range    Glucose 114 (H) 74 - 99 mg/dL    Sodium 139 136 - 145 mmol/L    Potassium 4.2 3.5 - 5.3 mmol/L    Chloride 104 98 - 107 mmol/L    Bicarbonate 24 21 - 32 mmol/L    Anion Gap 15 10 - 20 mmol/L    Urea Nitrogen 14 6 - 23 mg/dL    Creatinine 0.57 0.50 - 1.05 mg/dL    eGFR >90 >60 mL/min/1.73m*2    Calcium 9.4 8.6 - 10.6 mg/dL

## 2024-06-28 LAB
BACTERIA UR CULT: NORMAL
HOLD SPECIMEN: NORMAL

## 2024-06-29 LAB — STAPHYLOCOCCUS SPEC CULT: NORMAL

## 2024-07-05 ENCOUNTER — ANESTHESIA EVENT (OUTPATIENT)
Dept: OPERATING ROOM | Facility: HOSPITAL | Age: 39
End: 2024-07-05
Payer: COMMERCIAL

## 2024-07-05 NOTE — PROGRESS NOTES
History Of Present Illness  Alda Chaney is a 39 y.o. female presenting being seen today for CTEPH follow up.  Patient is NYHA Functional Class 1-2 and WHO Group 4 Her last visit was 1/15/2023. She has been with our clinic since 2012. No known admissions or new medical issues since her last visit. Seeing vascular doctor in [place].     PAH Treatment:   Opsumit 11/30/2019 (now restarted) but just approved  recently.  Uptravi 400 micrograms  BID 3/17/63521  Tadalafil 10/2013    Infusion site: N/A  Treatment history: Revatio: stop 2013, noncompliance    07/05/24     Interval History   Pt reports feeling the same since last visit and able to do her usual activities without problem. Had a recent vein study in right leg with no intervention. This is still causing some discomfort and is the probable cause due to decrease in 6 MWT.     Past Medical History  Patient Active Problem List   Diagnosis    Asthma ([facility])    Complicated migraine    Cough with sputum    CTEPH (chronic thromboembolic pulmonary hypertension) (Multi)    DVT (deep venous thrombosis) (Multi)    Exertional shortness of breath    Limb pain    Myasthenia gravis, acetylcholine receptor antibody positive (Multi)    Obesity    Pulmonary embolism (Multi)    Pulmonary vein injury    Seasonal allergic reaction    Secondary hypercoagulable state (Multi)    Shoulder pain    URI (upper respiratory infection)    Anal fissure    BRBPR (bright red blood per rectum)    Cardiomegaly    Anxiety disorder    Swelling of right lower extremity    Residual hemorrhoidal skin tags    Rectal pain    Postthrombotic syndrome of right lower extremity    Pain of right lower extremity    Localized edema    Diverticulosis of large intestine    Furuncle    Dependence on supplemental oxygen    Hypertropia of left eye    Hypertropia of right eye    Post-thrombotic syndrome of right lower extremity        Surgical History  She has a past surgical history that includes Eye surgery  (08/28/2013); Mouth surgery (08/28/2013); Other surgical history (12/16/2022); Thymectomy (08/31/2017); Other surgical history (02/09/2017); MR angio head w and wo IV contrast (04/16/2014); MR angio chest w IV contrast (01/23/2018); IR CVC tunneled (09/27/2016); MR angio chest w IV contrast (09/28/2012); Cardiac catheterization (N/A, 03/28/2024); Dilation and curettage of uterus (06/21/2024); and Endometrial ablation (06/21/2024).     Social History  She reports that she has never smoked. She has never been exposed to tobacco smoke. She has never used smokeless tobacco. She reports that she does not currently use alcohol. She reports that she does not use drugs.    Family History  Family History   Problem Relation Name Age of Onset    Other (diabetes mellitus) Mother      Hypertension Mother      Cervical cancer Mother      Heart attack Father      Other (diabetes mellitus) Father      Heart disease Father      Other (Adopted) Father          Medications      Current Outpatient Medications:     acetaminophen (TylenoL) 325 mg tablet, Take by mouth. TAKE 1 TO 2 TABLETS EVERY 4 HOURS AS NEEDED, Disp: , Rfl:     albuterol 90 mcg/actuation inhaler, TAKE 1 PUFF BY MOUTH EVERY 4 HOURS AS NEEDED, Disp: 8.5 g, Rfl: 11    budesonide-formoteroL (Symbicort) 160-4.5 mcg/actuation inhaler, Inhale 2 puffs 2 times a day. Rinse mouth with water after use to reduce aftertaste and incidence of candidiasis. Do not swallow., Disp: 10.2 g, Rfl: 2    calcium carbonate-vitamin D3 600 mg-10 mcg (400 unit) tablet, Take 2 tablets by mouth once daily., Disp: , Rfl:     chlorhexidine (Peridex) 0.12 % solution, Use 15 mL in the mouth or throat see administration instructions for 2 doses. Use the night before and morning of surgery., Disp: 473 mL, Rfl: 0    fexofenadine (Allegra) 180 mg tablet, Take 1 tablet (180 mg) by mouth once daily., Disp: 90 tablet, Rfl: 1    fluticasone (Flonase) 50 mcg/actuation nasal spray, Administer 1 spray into each  nostril 1 time if needed., Disp: , Rfl:     loperamide (Imodium A-D) 2 mg tablet, Take 1 tablet (2 mg) by mouth 4 times a day as needed for diarrhea., Disp: 30 tablet, Rfl: 11    macitentan (Opsumit) 10 mg tablet tablet, Take 1 tablet (10 mg) by mouth once daily., Disp: , Rfl:     montelukast (Singulair) 10 mg tablet, Take 1 tablet (10 mg) by mouth once daily at bedtime., Disp: , Rfl:     pyridostigmine (Mestinon) 60 mg tablet, Take 2 tablets (120 mg) by mouth once daily., Disp: , Rfl:     selexipag (Uptravi) 200 mcg tablet, Take 2 tablets (400 mcg) by mouth once daily., Disp: , Rfl:     tadalafil, antihypertensive, 20 mg tablet, TAKE 2 TABLETS DAILY, Disp: 60 tablet, Rfl: 2    triamcinolone (Kenalog) 0.1 % cream, Apply topically 2 times a day. Apply to affected area 1-2 times daily as needed. Avoid face and groin., Disp: 30 g, Rfl: 0    Xarelto 20 mg tablet, TAKE 1 TABLET BY MOUTH EVERY DAY, Disp: 30 tablet, Rfl: 11     Allergies  Bee venom protein (honey bee), Ciprofloxacin, Levofloxacin, Magnesium, Magnesium chloride, and Magnesium sulfate    Review of Systems   Constitutional: Negative.  Negative for fatigue.   Eyes: Negative.    Respiratory:  Positive for cough and shortness of breath. Negative for chest tightness.    Cardiovascular:  Negative for chest pain, palpitations and leg swelling.   Gastrointestinal:  Negative for diarrhea and nausea.   Endocrine: Negative.    Genitourinary: Negative.    Musculoskeletal: Negative.    Skin: Negative.    Allergic/Immunologic: Negative.    Neurological:  Negative for dizziness, syncope, light-headedness and headaches.   Hematological: Negative.    Psychiatric/Behavioral: Negative.         Last Recorded Vitals    Vitals:    07/10/24 1458   BP: 117/58   Pulse: 86   SpO2: 96%          Physical Exam  Constitutional:       Appearance: Normal appearance. She is obese.   HENT:      Head: Normocephalic.      Nose: Nose normal.      Mouth/Throat:      Mouth: Mucous membranes are  moist.   Cardiovascular:      Rate and Rhythm: Normal rate and regular rhythm.   Pulmonary:      Effort: Pulmonary effort is normal.      Breath sounds: Normal breath sounds.   Abdominal:      General: Bowel sounds are normal.      Palpations: Abdomen is soft.   Musculoskeletal:      Cervical back: Normal range of motion.      Right lower leg: No edema.      Left lower leg: No edema.   Skin:     Findings: No rash.   Psychiatric:         Mood and Affect: Mood normal.         Judgment: Judgment normal.            Relevant Results    6MWT(7/10/2024)  SP02- 98- 93% RA  HR-   REUBEN- 0-3  Actual Meters 408    RHC(3/28/2024)  PAP- 40/18 (27)  PCWP- 13  CO/CI- 2.7/5.2    6MWT (1/15/24)  HR 64 - 133  O2 94 - 86 on RA  Reuben 0 - 4  Meters 465     6MWT (23)  SpO2: 96% - 89% on RA  HR: 74 - 139  Reuben: 0 - 3  Actual 466m     6MWT (2023)  SpO2: 97-88% RA  HR:   Reuben: 0-3  Actual meters: 457    Echo (23)   Left Ventricle: Left ventricular systolic function is normal, with an estimated ejection fraction of 60%. There are no regional wall motion abnormalities. The left ventricular cavity size is normal. Spectral Doppler shows a pseudonormal pattern of left ventricular diastolic filling.  Left Atrium: The left atrium is normal in size. A bubble study using agitated saline was performed. Bubble study is negative.  Right Ventricle: The right ventricle is upper limits of normal in size. There is reduced right ventricular systolic function.  Right Atrium: The right atrium is normal in size.  Aortic Valve: The aortic valve is trileaflet. There is no evidence of aortic valve regurgitation. The peak instantaneous gradient of the aortic valve is 8.0 mmHg. The mean gradient of the aortic valve is 4.0 mmHg.  Mitral Valve: The mitral valve is normal in structure. There is no evidence of mitral valve regurgitation.  Tricuspid Valve: The tricuspid valve is structurally normal. No evidence of tricuspid  regurgitation.  Pulmonic Valve: The pulmonic valve is not well visualized. There is no indication of pulmonic valve regurgitation.  Pericardium: There is no pericardial effusion noted.  Aorta: The aortic root is normal.  Systemic Veins: The inferior vena cava appears to be of normal size. There is IVC inspiratory collapse greater than 50%.  CONCLUSIONS:  1. Left ventricular systolic function is normal with a 60% estimated ejection fraction.  2. Spectral Doppler shows a pseudonormal pattern of left ventricular diastolic filling.  3. There is reduced right ventricular systolic function.     6MWT (4/3/23)  SpO2: 97% - 91% on RA  HR: 72 - 135  Reuben: 0 - 3  Actual 450m     Echo (3/12/22): normal size RV with mildly reduced function, normal size RA     Pulmonary angiogram (2019) with Dr. Veronica - attempted left pulmonary artery angioplasty.  Pulmonary angioplasty (2019) with Dr. Veronica - balloon angio to distal left main, pulmonary artery and segmental branches to the lingula and left lower lobe.     Assessment/Plan     1) Pulmonary   a. CTED limited mostly to right lung - near normal RV - unchanged exercise ability FC II. Known previous desat with significant exercise, prescribed oxygen, will not use. 2015 stress echo with maintained RVSP , TAPSE and RV size at peak exertion. Echo 10/23/2017 near normal to mild RV, likely unchanged. Cath last year 2017 with resting mPAP of 35 mm Hg. 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 Uptravi started, significant side effects because she inappropriately escalated dosing without reviewing or contacting physician office as instructed. we then slowly increase  dose and only change after direct consultation with pulmonary vascular office. Also with pulmonary  vein stenosis as part of the same process.      1/10/2022 - taking meds, no edema tolerating 400/400 ug selexipag.      9/12/2022 - stopped taking PH meds because she is in [place]. Still taking anticoagulation. Echo unchanged with muscular RV but normal size, unchanged compared to 2021.     4/3/2023 - Last seen September 2022 with normal echo but had interrupted meds due to insurance issues. She has not worked to clear this up but did not tell us. Today has insurance approval but has not restarted. Will restart. triple therapy sequentially.      9/25/2023 - (+) 9 m , unchanged drop in SpO2 to 89%, using meds, resistant to uptitration of selexipag due to work.     1/15/2024 - Specialty pharmacy claims no delivery since July 2023 for Uptravi. Patient claims she has significant previously delivered drug which she is using, this is possible given dose, etc. Patient unable to uptitrate past 600 BID due to diarrhea, headache. May have to decrease to 600/400 unless sx decrease over next 1-2 weeks. Walk similar but desaturation to 86%, recommend use of ambulatory oxygen.      b. Desaturation due to CTED and dead space- still SpO2 = 91% on room air.      c. 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 This Limits possibilities for PTE. Angio 4/2019 suggested improvement of this flow and patient underwent BPA with significant subjective improvement which has now waned. Unclear if this is due to restenosis or ?     d. Previously INcreasing wheezing and allergy symptoms, patient has had albuterol inhaler for long time but very, very rarely. Now using regular symbicort, no nocturnal symptoms.      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      3) Obesity with increased weight, BMI = 35.5-> 36.9->37-.37.6     4) COVID infection, minor dyspnea,  "no oxygen , stayed home, no CXR feels \"normal\" now.    Plan    1) No change in meds.  2) Continue anticoagulation.   3) Weight loss.   4) No uptitration of selexipag at this time given symptoms. currently at 400/600. Side effects only diarrhea which is well controlled on imodium. No  change in selexipag for 1 month, patient to call then and we will reassess dosing.              "

## 2024-07-08 ENCOUNTER — HOSPITAL ENCOUNTER (OUTPATIENT)
Facility: HOSPITAL | Age: 39
Setting detail: OUTPATIENT SURGERY
Discharge: HOME | End: 2024-07-08
Attending: SURGERY | Admitting: SURGERY
Payer: COMMERCIAL

## 2024-07-08 ENCOUNTER — APPOINTMENT (OUTPATIENT)
Dept: RADIOLOGY | Facility: HOSPITAL | Age: 39
End: 2024-07-08
Payer: COMMERCIAL

## 2024-07-08 ENCOUNTER — ANESTHESIA (OUTPATIENT)
Dept: OPERATING ROOM | Facility: HOSPITAL | Age: 39
End: 2024-07-08
Payer: COMMERCIAL

## 2024-07-08 VITALS
SYSTOLIC BLOOD PRESSURE: 150 MMHG | BODY MASS INDEX: 35.23 KG/M2 | HEART RATE: 52 BPM | RESPIRATION RATE: 18 BRPM | WEIGHT: 198.85 LBS | OXYGEN SATURATION: 95 % | DIASTOLIC BLOOD PRESSURE: 61 MMHG | TEMPERATURE: 97.7 F | HEIGHT: 63 IN

## 2024-07-08 DIAGNOSIS — I87.001 POST-THROMBOTIC SYNDROME OF RIGHT LOWER EXTREMITY: Primary | ICD-10-CM

## 2024-07-08 LAB
ACT BLD: 221 SEC (ref 83–199)
PREGNANCY TEST URINE, POC: NEGATIVE

## 2024-07-08 PROCEDURE — 7100000010 HC PHASE TWO TIME - EACH INCREMENTAL 1 MINUTE: Performed by: SURGERY

## 2024-07-08 PROCEDURE — 3600000004 HC OR TIME - INITIAL BASE CHARGE - PROCEDURE LEVEL FOUR: Performed by: SURGERY

## 2024-07-08 PROCEDURE — 81025 URINE PREGNANCY TEST: CPT | Performed by: ANESTHESIOLOGY

## 2024-07-08 PROCEDURE — 7100000002 HC RECOVERY ROOM TIME - EACH INCREMENTAL 1 MINUTE: Performed by: SURGERY

## 2024-07-08 PROCEDURE — 7100000009 HC PHASE TWO TIME - INITIAL BASE CHARGE: Performed by: SURGERY

## 2024-07-08 PROCEDURE — 96372 THER/PROPH/DIAG INJ SC/IM: CPT

## 2024-07-08 PROCEDURE — 3600000009 HC OR TIME - EACH INCREMENTAL 1 MINUTE - PROCEDURE LEVEL FOUR: Performed by: SURGERY

## 2024-07-08 PROCEDURE — C1887 CATHETER, GUIDING: HCPCS | Performed by: SURGERY

## 2024-07-08 PROCEDURE — 2500000005 HC RX 250 GENERAL PHARMACY W/O HCPCS: Mod: SE

## 2024-07-08 PROCEDURE — 7100000001 HC RECOVERY ROOM TIME - INITIAL BASE CHARGE: Performed by: SURGERY

## 2024-07-08 PROCEDURE — C1769 GUIDE WIRE: HCPCS | Performed by: SURGERY

## 2024-07-08 PROCEDURE — 2720000007 HC OR 272 NO HCPCS: Performed by: SURGERY

## 2024-07-08 PROCEDURE — 3700000001 HC GENERAL ANESTHESIA TIME - INITIAL BASE CHARGE: Performed by: SURGERY

## 2024-07-08 PROCEDURE — 75820 VEIN X-RAY ARM/LEG: CPT | Performed by: SURGERY

## 2024-07-08 PROCEDURE — 85347 COAGULATION TIME ACTIVATED: CPT

## 2024-07-08 PROCEDURE — 2550000001 HC RX 255 CONTRASTS: Mod: SE | Performed by: SURGERY

## 2024-07-08 PROCEDURE — 75825 VEIN X-RAY TRUNK: CPT | Performed by: SURGERY

## 2024-07-08 PROCEDURE — 2500000005 HC RX 250 GENERAL PHARMACY W/O HCPCS: Mod: SE | Performed by: SURGERY

## 2024-07-08 PROCEDURE — 76937 US GUIDE VASCULAR ACCESS: CPT | Performed by: SURGERY

## 2024-07-08 PROCEDURE — 3700000002 HC GENERAL ANESTHESIA TIME - EACH INCREMENTAL 1 MINUTE: Performed by: SURGERY

## 2024-07-08 PROCEDURE — 2500000004 HC RX 250 GENERAL PHARMACY W/ HCPCS (ALT 636 FOR OP/ED): Mod: SE

## 2024-07-08 PROCEDURE — 2500000004 HC RX 250 GENERAL PHARMACY W/ HCPCS (ALT 636 FOR OP/ED): Mod: SE | Performed by: SURGERY

## 2024-07-08 RX ORDER — OXYCODONE HYDROCHLORIDE 5 MG/1
5 TABLET ORAL EVERY 4 HOURS PRN
Status: DISCONTINUED | OUTPATIENT
Start: 2024-07-08 | End: 2024-07-08 | Stop reason: HOSPADM

## 2024-07-08 RX ORDER — HYDROMORPHONE HYDROCHLORIDE 1 MG/ML
0.2 INJECTION, SOLUTION INTRAMUSCULAR; INTRAVENOUS; SUBCUTANEOUS EVERY 5 MIN PRN
Status: DISCONTINUED | OUTPATIENT
Start: 2024-07-08 | End: 2024-07-08 | Stop reason: HOSPADM

## 2024-07-08 RX ORDER — HEPARIN SODIUM 5000 [USP'U]/ML
INJECTION, SOLUTION INTRAVENOUS; SUBCUTANEOUS AS NEEDED
Status: DISCONTINUED | OUTPATIENT
Start: 2024-07-08 | End: 2024-07-08

## 2024-07-08 RX ORDER — INDOMETHACIN 25 MG/1
CAPSULE ORAL AS NEEDED
Status: DISCONTINUED | OUTPATIENT
Start: 2024-07-08 | End: 2024-07-08 | Stop reason: HOSPADM

## 2024-07-08 RX ORDER — PROPOFOL 10 MG/ML
INJECTION, EMULSION INTRAVENOUS CONTINUOUS PRN
Status: DISCONTINUED | OUTPATIENT
Start: 2024-07-08 | End: 2024-07-08

## 2024-07-08 RX ORDER — SODIUM CHLORIDE, SODIUM LACTATE, POTASSIUM CHLORIDE, CALCIUM CHLORIDE 600; 310; 30; 20 MG/100ML; MG/100ML; MG/100ML; MG/100ML
100 INJECTION, SOLUTION INTRAVENOUS CONTINUOUS
Status: DISCONTINUED | OUTPATIENT
Start: 2024-07-08 | End: 2024-07-08 | Stop reason: HOSPADM

## 2024-07-08 RX ORDER — METOCLOPRAMIDE HYDROCHLORIDE 5 MG/ML
10 INJECTION INTRAMUSCULAR; INTRAVENOUS ONCE AS NEEDED
Status: DISCONTINUED | OUTPATIENT
Start: 2024-07-08 | End: 2024-07-08 | Stop reason: HOSPADM

## 2024-07-08 RX ORDER — LIDOCAINE HYDROCHLORIDE 10 MG/ML
INJECTION INFILTRATION; PERINEURAL AS NEEDED
Status: DISCONTINUED | OUTPATIENT
Start: 2024-07-08 | End: 2024-07-08 | Stop reason: HOSPADM

## 2024-07-08 RX ORDER — PHENYLEPHRINE HYDROCHLORIDE 10 MG/ML
INJECTION INTRAVENOUS AS NEEDED
Status: DISCONTINUED | OUTPATIENT
Start: 2024-07-08 | End: 2024-07-08

## 2024-07-08 RX ORDER — HYDROMORPHONE HYDROCHLORIDE 1 MG/ML
0.4 INJECTION, SOLUTION INTRAMUSCULAR; INTRAVENOUS; SUBCUTANEOUS EVERY 5 MIN PRN
Status: DISCONTINUED | OUTPATIENT
Start: 2024-07-08 | End: 2024-07-08 | Stop reason: HOSPADM

## 2024-07-08 RX ORDER — ACETAMINOPHEN 325 MG/1
650 TABLET ORAL EVERY 4 HOURS PRN
Status: DISCONTINUED | OUTPATIENT
Start: 2024-07-08 | End: 2024-07-08 | Stop reason: HOSPADM

## 2024-07-08 RX ORDER — MIDAZOLAM HYDROCHLORIDE 1 MG/ML
INJECTION INTRAMUSCULAR; INTRAVENOUS AS NEEDED
Status: DISCONTINUED | OUTPATIENT
Start: 2024-07-08 | End: 2024-07-08

## 2024-07-08 RX ORDER — CEFAZOLIN 1 G/1
INJECTION, POWDER, FOR SOLUTION INTRAVENOUS AS NEEDED
Status: DISCONTINUED | OUTPATIENT
Start: 2024-07-08 | End: 2024-07-08

## 2024-07-08 RX ORDER — BUPIVACAINE HYDROCHLORIDE 2.5 MG/ML
INJECTION, SOLUTION INFILTRATION; PERINEURAL AS NEEDED
Status: DISCONTINUED | OUTPATIENT
Start: 2024-07-08 | End: 2024-07-08 | Stop reason: HOSPADM

## 2024-07-08 RX ORDER — IODIXANOL 320 MG/ML
INJECTION, SOLUTION INTRAVASCULAR AS NEEDED
Status: DISCONTINUED | OUTPATIENT
Start: 2024-07-08 | End: 2024-07-08 | Stop reason: HOSPADM

## 2024-07-08 RX ORDER — SODIUM CHLORIDE, SODIUM LACTATE, POTASSIUM CHLORIDE, CALCIUM CHLORIDE 600; 310; 30; 20 MG/100ML; MG/100ML; MG/100ML; MG/100ML
INJECTION, SOLUTION INTRAVENOUS CONTINUOUS PRN
Status: DISCONTINUED | OUTPATIENT
Start: 2024-07-08 | End: 2024-07-08

## 2024-07-08 RX ORDER — ONDANSETRON HYDROCHLORIDE 2 MG/ML
4 INJECTION, SOLUTION INTRAVENOUS ONCE AS NEEDED
Status: DISCONTINUED | OUTPATIENT
Start: 2024-07-08 | End: 2024-07-08 | Stop reason: HOSPADM

## 2024-07-08 SDOH — HEALTH STABILITY: MENTAL HEALTH: CURRENT SMOKER: 0

## 2024-07-08 ASSESSMENT — PAIN - FUNCTIONAL ASSESSMENT
PAIN_FUNCTIONAL_ASSESSMENT: 0-10

## 2024-07-08 ASSESSMENT — PAIN SCALES - GENERAL
PAINLEVEL_OUTOF10: 0 - NO PAIN
PAIN_LEVEL: 1

## 2024-07-08 NOTE — OP NOTE
RIGHT LEG venogram, ivus, possible stenting, possible jugular approach (R) Operative Note     Date: 2024  OR Location: [facility] OR    Name: Alda Chaney, : 1985, Age: 39 y.o., MRN: [id number], Sex: female    Diagnosis  Pre-op Diagnosis     * Post-thrombotic syndrome of right lower extremity [I87.001] Post-op Diagnosis     * Post-thrombotic syndrome of right lower extremity [I87.001]     Procedures  US guided access right SSV for popliteal vein access/sheath placement  Right leg venogram, iliofemoral venography    Surgeons      * Khalida Caldera - Primary  Co-Surgeon-Assist: Dr. Jasmin Samuels    Resident/Fellow/Other Assistant:  Surgeons and Role:     * Kaylah Cuevas MD - Assisting    Procedure Summary  Anesthesia: Monitor Anesthesia Care  ASA: III  Anesthesia Staff: Anesthesiologist: Hannah Kellogg MD  C-AA: NILSA Stone  Anesthesia Resident: Clayton Song DO  Estimated Blood Loss: 10 mL  Intra-op Medications:   Administrations occurring from 0715 to 0950 on 24:   Medication Name Total Dose   lidocaine (Xylocaine) 10 mg/mL (1 %) injection 5 mL   BUPivacaine HCl (Marcaine) 0.25 % (2.5 mg/mL) injection 10 mL   iodixanol (VISIPaque) 320 mg iodine/mL injection 35 mL   sodium bicarbonate 1 mEq/mL (8.4 %) injection 25 mEq   pyridostigmine (Regonal) injection 2 mg 2 mg              Anesthesia Record               Intraprocedure I/O Totals          Intake    Propofol Drip 0.00 mL    The total shown is the total volume documented since Anesthesia Start was filed.    LR infusion 500.00 mL    Total Intake 500 mL       Output    Urine 50 mL    Est. Blood Loss 5 mL    Total Output 55 mL       Net    Net Volume 445 mL          Specimen: No specimens collected     Staff:   Circulator: Samantha  Scrub Person: Whitney  Scrub Person: Mey      Drains and/or Catheters:   [REMOVED] Urethral Catheter 16 Fr. (Removed)       Indications: Alda Chaney is an 39 y.o. female who is having surgery for  Post-thrombotic syndrome of right lower extremity [I87.001].  She has postthrombotic syndrome of the right lower extremity with venous claudication.  She also has leg edema.  She has been on anticoagulation for some time and is compliant with medication as well as compression therapy.  She presents today for an evaluation of possible endovenous reconstruction on the right leg.    The patient was seen in the preoperative area. The risks, benefits, complications, treatment options, non-operative alternatives, expected recovery and outcomes were discussed with the patient. The possibilities of reaction to medication, pulmonary aspiration, injury to surrounding structures, bleeding, recurrent infection, the need for additional procedures, failure to diagnose a condition, and creating a complication requiring transfusion or operation were discussed with the patient. The patient concurred with the proposed plan, giving informed consent.  The site of surgery was properly noted/marked if necessary per policy. The patient has been actively warmed in preoperative area. Preoperative antibiotics have been ordered and given within 1 hours of incision. Venous thrombosis prophylaxis are not indicated.  The patient was given some heparin for the case.    Procedure Details: The patient was brought to the operating room.  She was placed the operative table in supine position with her arms tucked.  Appropriate timeout huddle was performed the name date of birth and medical record number.  She underwent MAC anesthesia.  We placed a sterile Casillas.  Prepped and draped the right leg groins into the field in standard fashion she received 1 dose of antibiotics.    We used ultrasound-guided access to cannulate the right small saphenous vein which appeared to be at least 5 mm to accommodate a 9 French sheath as needed.  We placed a micropuncture needle under ultrasound then advanced the wire confirmed its location with fluoroscopy and then  made a nick incision and upsized to a micropuncture sheath.  Remove the dilator and wire and then placed a Glidewire up into the mid femoral vein upsizing to a 4 Thai 10 cm long sheath on the right popliteal vein.  Venogram showed that one of the 2 duplicating veins was thrombosed and then the femoral vein was occluded at the outflow third of the femoral vein.  There was significant collateralization going towards what appeared to be collaterals in the groin with no inline common femoral vein noted.    Pelvis filled with through mostly collaterals.  The common femoral vein was atretic and there was no good inline flow or vessel to maintain any possible stent that I would consider placing there.  Because of a poor inflow I did not feel that this was a suitable anatomic case for endovenous reconstruction.  She did get a one-time bolus of heparin.  We did not give any protamine.  At this time the sheath was removed and pressure was held and the Casillas was also removed before she left the room.  We Ace wrapped the legs.  At this point the procedure was completed.  I was present for the entire case.  All counts were correct.    Complications:  None; patient tolerated the procedure well.    Disposition: PACU - hemodynamically stable.  Condition: stable     Attending Attestation: I was present and scrubbed for the entire procedure.    Khalida Caldera  Phone Number: [phone]

## 2024-07-08 NOTE — H&P
Interval H and P    Alda Chaney is a 39 y.o. year old female patient with history of  recurrent right lower extremity DVT and pulmonary embolism.     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  Discussed benefits, risks, and alternatives with patient. Consent obtained, appropriate side marked.     Discussed case with Dr. Caldera.    Kaylah Cuevas MD  PGY-2 Vascular Surgery Resident  [id number]

## 2024-07-08 NOTE — DISCHARGE INSTRUCTIONS
You underwent a right leg venogram with no intervention (no stent).     You may resume your xarelto tonight, 7/8/2024. Continue with your anticoagulation medication and blood thinner. You will follow up with Dr. Samuels in clinic.   No activity restrictions.

## 2024-07-08 NOTE — ANESTHESIA PREPROCEDURE EVALUATION
Patient: Alda Chaney    Procedure Information       Date/Time: 07/08/24 0715    Procedure: RIGHT LEG venogram, ivus, possible stenting, possible jugular approach (Right)    Location: [facility] OR 27 / [facility] OR    Surgeons: Khalida Caldera MD            Relevant Problems   Anesthesia (within normal limits)      Pulmonary   (+) Asthma (HHS-HCC)   (+) CTEPH (chronic thromboembolic pulmonary hypertension) (Multi)   (+) Exertional shortness of breath   (+) Pulmonary embolism (Multi)      Neuro   (+) Anxiety disorder   (+) Myasthenia gravis, acetylcholine receptor antibody positive (Multi)      GI   (+) BRBPR (bright red blood per rectum)      /Renal (within normal limits)      Liver (within normal limits)      Endocrine   (+) Obesity      Hematology   (+) CTEPH (chronic thromboembolic pulmonary hypertension) (Multi)   (+) DVT (deep venous thrombosis) (Multi)      HEENT   (+) Seasonal allergic reaction      ID   (+) Furuncle   (+) URI (upper respiratory infection)       Clinical information reviewed:                   NPO Detail:  No data recorded     Physical Exam    Airway  Mallampati: I  TM distance: >3 FB     Cardiovascular    Dental    Pulmonary    Abdominal            Anesthesia Plan    History of general anesthesia?: yes  History of complications of general anesthesia?: no    ASA 3     MAC     The patient is not a current smoker.  Patient did not smoke on day of procedure.    intravenous induction   Anesthetic plan and risks discussed with patient.    Plan discussed with attending.      Consented for GA as well

## 2024-07-08 NOTE — ANESTHESIA POSTPROCEDURE EVALUATION
Patient: Alda Chaney    Procedure Summary       Date: 07/08/24 Room / Location: [facility] OR 27 / [facility]    Anesthesia Start: 0820 Anesthesia Stop: 1001    Procedure: RIGHT LEG venogram, ivus, possible stenting, possible jugular approach (Right) Diagnosis:       Post-thrombotic syndrome of right lower extremity      (Post-thrombotic syndrome of right lower extremity [I87.001])    Surgeons: Khalida Caldera MD Responsible Provider: Hannah Kellogg MD    Anesthesia Type: MAC ASA Status: 3            Anesthesia Type: MAC    Vitals Value Taken Time   /66 07/08/24 0956   Temp 36.0 07/08/24 1002   Pulse 67 07/08/24 0958   Resp 14 07/08/24 0958   SpO2 100 % 07/08/24 0958   Vitals shown include unfiled device data.    Anesthesia Post Evaluation    Patient location during evaluation: PACU  Patient participation: complete - patient participated  Level of consciousness: awake and alert  Pain score: 1  Pain management: adequate  Multimodal analgesia pain management approach  Airway patency: patent  Cardiovascular status: acceptable and hemodynamically stable  Respiratory status: acceptable and room air  Hydration status: acceptable  Postoperative Nausea and Vomiting: none        There were no known notable events for this encounter.

## 2024-07-09 ENCOUNTER — DOCUMENTATION (OUTPATIENT)
Dept: PULMONOLOGY | Facility: HOSPITAL | Age: 39
End: 2024-07-09
Payer: COMMERCIAL

## 2024-07-10 ENCOUNTER — HOSPITAL ENCOUNTER (OUTPATIENT)
Dept: CARDIOLOGY | Facility: HOSPITAL | Age: 39
Discharge: HOME | End: 2024-07-10
Payer: COMMERCIAL

## 2024-07-10 ENCOUNTER — OFFICE VISIT (OUTPATIENT)
Dept: PULMONOLOGY | Facility: HOSPITAL | Age: 39
End: 2024-07-10
Payer: COMMERCIAL

## 2024-07-10 ENCOUNTER — HOSPITAL ENCOUNTER (OUTPATIENT)
Dept: RESPIRATORY THERAPY | Facility: HOSPITAL | Age: 39
Discharge: HOME | End: 2024-07-10
Payer: COMMERCIAL

## 2024-07-10 VITALS
DIASTOLIC BLOOD PRESSURE: 58 MMHG | SYSTOLIC BLOOD PRESSURE: 117 MMHG | HEART RATE: 86 BPM | WEIGHT: 141 LBS | HEIGHT: 61 IN | BODY MASS INDEX: 26.62 KG/M2 | OXYGEN SATURATION: 96 %

## 2024-07-10 DIAGNOSIS — Z79.899 LONG-TERM USE OF HIGH-RISK MEDICATION: ICD-10-CM

## 2024-07-10 DIAGNOSIS — I27.20 PULMONARY HYPERTENSION (MULTI): ICD-10-CM

## 2024-07-10 DIAGNOSIS — I27.24 CTEPH (CHRONIC THROMBOEMBOLIC PULMONARY HYPERTENSION) (MULTI): ICD-10-CM

## 2024-07-10 DIAGNOSIS — R06.02 SOB (SHORTNESS OF BREATH): ICD-10-CM

## 2024-07-10 DIAGNOSIS — R06.02 EXERTIONAL SHORTNESS OF BREATH: ICD-10-CM

## 2024-07-10 PROCEDURE — 99215 OFFICE O/P EST HI 40 MIN: CPT | Performed by: INTERNAL MEDICINE

## 2024-07-10 PROCEDURE — 93306 TTE W/DOPPLER COMPLETE: CPT

## 2024-07-10 PROCEDURE — 94618 PULMONARY STRESS TESTING: CPT

## 2024-07-10 PROCEDURE — 1036F TOBACCO NON-USER: CPT | Performed by: INTERNAL MEDICINE

## 2024-07-10 PROCEDURE — 93306 TTE W/DOPPLER COMPLETE: CPT | Performed by: INTERNAL MEDICINE

## 2024-07-10 PROCEDURE — 94618 PULMONARY STRESS TESTING: CPT | Performed by: INTERNAL MEDICINE

## 2024-07-10 PROCEDURE — 99215 OFFICE O/P EST HI 40 MIN: CPT | Mod: 25 | Performed by: INTERNAL MEDICINE

## 2024-07-10 ASSESSMENT — ENCOUNTER SYMPTOMS
ENDOCRINE NEGATIVE: 1
FATIGUE: 0
NAUSEA: 0
DEPRESSION: 0
CONSTITUTIONAL NEGATIVE: 1
LOSS OF SENSATION IN FEET: 0
CHEST TIGHTNESS: 0
SHORTNESS OF BREATH: 1
OCCASIONAL FEELINGS OF UNSTEADINESS: 0
LIGHT-HEADEDNESS: 0
COUGH: 1
ALLERGIC/IMMUNOLOGIC NEGATIVE: 1
EYES NEGATIVE: 1
HEADACHES: 0
PALPITATIONS: 0
DIARRHEA: 0
DIZZINESS: 0
PSYCHIATRIC NEGATIVE: 1
MUSCULOSKELETAL NEGATIVE: 1
HEMATOLOGIC/LYMPHATIC NEGATIVE: 1

## 2024-07-10 ASSESSMENT — PAIN SCALES - GENERAL: PAINLEVEL: 0-NO PAIN

## 2024-07-10 ASSESSMENT — PATIENT HEALTH QUESTIONNAIRE - PHQ9
1. LITTLE INTEREST OR PLEASURE IN DOING THINGS: NOT AT ALL
SUM OF ALL RESPONSES TO PHQ9 QUESTIONS 1 AND 2: 0
2. FEELING DOWN, DEPRESSED OR HOPELESS: NOT AT ALL
1. LITTLE INTEREST OR PLEASURE IN DOING THINGS: NOT AT ALL
2. FEELING DOWN, DEPRESSED OR HOPELESS: NOT AT ALL
SUM OF ALL RESPONSES TO PHQ9 QUESTIONS 1 AND 2: 0

## 2024-07-11 LAB
AORTIC VALVE PEAK VELOCITY: 1.05 M/S
AV PEAK GRADIENT: 4.4 MMHG
AVA (PEAK VEL): 3.8 CM2
EJECTION FRACTION APICAL 4 CHAMBER: 57.9
EJECTION FRACTION: 58 %
LEFT ATRIUM VOLUME AREA LENGTH INDEX BSA: 20 ML/M2
LEFT VENTRICLE INTERNAL DIMENSION DIASTOLE: 4.6 CM (ref 3.5–6)
LEFT VENTRICULAR OUTFLOW TRACT DIAMETER: 2.4 CM
MITRAL VALVE E/A RATIO: 1.19
RIGHT VENTRICLE FREE WALL PEAK S': 9.79 CM/S
TRICUSPID ANNULAR PLANE SYSTOLIC EXCURSION: 1.4 CM

## 2024-07-24 ENCOUNTER — APPOINTMENT (OUTPATIENT)
Dept: CARDIOLOGY | Facility: CLINIC | Age: 39
End: 2024-07-24
Payer: COMMERCIAL

## 2024-07-24 VITALS
OXYGEN SATURATION: 98 % | WEIGHT: 196.3 LBS | SYSTOLIC BLOOD PRESSURE: 98 MMHG | BODY MASS INDEX: 37.06 KG/M2 | HEIGHT: 61 IN | HEART RATE: 77 BPM | DIASTOLIC BLOOD PRESSURE: 72 MMHG

## 2024-07-24 DIAGNOSIS — I89.0 LYMPHEDEMA: ICD-10-CM

## 2024-07-24 DIAGNOSIS — R60.0 LOCALIZED EDEMA: ICD-10-CM

## 2024-07-24 DIAGNOSIS — M79.89 SWELLING OF RIGHT LOWER EXTREMITY: Primary | ICD-10-CM

## 2024-07-24 PROCEDURE — 99214 OFFICE O/P EST MOD 30 MIN: CPT | Performed by: STUDENT IN AN ORGANIZED HEALTH CARE EDUCATION/TRAINING PROGRAM

## 2024-07-24 PROCEDURE — 3008F BODY MASS INDEX DOCD: CPT | Performed by: STUDENT IN AN ORGANIZED HEALTH CARE EDUCATION/TRAINING PROGRAM

## 2024-07-24 PROCEDURE — 1036F TOBACCO NON-USER: CPT | Performed by: STUDENT IN AN ORGANIZED HEALTH CARE EDUCATION/TRAINING PROGRAM

## 2024-07-24 NOTE — PROGRESS NOTES
Chief complaint:   Chief Complaint   Patient presents with    Hospital Follow-up     Angiogram right leg        History of Present Illness  Alda Chaney is a 39 y.o. 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      On her initial visit patient reported having since her right lower extremity DVT persistent right lower extremity swelling.  She also endorses venous claudication on the right leg.  She works as a  and reports the pain limits her regular work life, since she has to avoid going on longer walks due to leg discomfort.  She also reports that she definitely feels the pain more when she goes on her 6-minute walk test. Reports she also has painful vulvar varicosities,    She is wearing compression stockings more regularly.  And she has been maintained on Xarelto 20 mg daily.    Since last visit with Dr. Caldera we performed right lower extremity venogram with intervention for potential intervention, however due to poor inflow she was deemed to not be a suitable candidate for stenting.    Social History     Tobacco Use    Smoking status: Never     Passive exposure: Never    Smokeless tobacco: Never   Vaping Use    Vaping status: Never Used   Substance Use Topics    Alcohol use: Not Currently     Comment: Rarely    Drug use: Never       Outpatient Medications:  Current Outpatient Medications   Medication Instructions    acetaminophen (TylenoL) 325 mg tablet oral, TAKE 1 TO 2 TABLETS EVERY 4 HOURS AS NEEDED    albuterol 90 mcg/actuation inhaler 1 puff, inhalation, Every 4 hours PRN    budesonide-formoteroL (Symbicort) 160-4.5 mcg/actuation inhaler 2 puffs, inhalation, 2 times daily RT, Rinse mouth with water after use to reduce aftertaste and incidence of candidiasis. Do not swallow.    calcium carbonate-vitamin D3 600 mg-10 mcg (400 unit) tablet 2 tablets, oral, Daily, Takes infrequently     fexofenadine (ALLEGRA) 180 mg, oral, Daily    fluticasone (Flonase) 50 mcg/actuation nasal spray 1 spray, Each Nostril, Once as needed    loperamide (IMODIUM A-D) 2 mg, oral, 4 times daily PRN    macitentan (Opsumit) 10 mg tablet tablet 1 tablet, oral, Daily    montelukast (SINGULAIR) 10 mg, oral, Nightly    NON FORMULARY 2 each, oral, Daily, Vein supplement    pyridostigmine (Mestinon) 60 mg tablet 2 tablets, oral, Daily    selexipag (UPTRAVI) 400 mcg, oral, Daily, 400 mg in AM and 600 mg in PM    tadalafil (CIALIS) 40 mg, oral, Daily    triamcinolone (Kenalog) 0.1 % cream Topical, 2 times daily, Apply to affected area 1-2 times daily as needed. Avoid face and groin.    Xarelto 20 mg, oral, Daily         Vitals:  Vitals:    07/24/24 1101   BP: 98/72   Pulse: 77   SpO2: 98%       Physical Exam:  General: NAD, well-appearing  HEENT: moist mucous membranes, no jaundice  Neck: No JVD, no carotid bruit  Lungs: CTA cady, no wheezing or rales  Cardiac: RRR, no murmurs  Abdomen: soft, non-tender, non-distended  Extremities: 2+ radial pulses, right lower extremity edema edema, palpable pulses  Skin: warm, dry, no wound  Neurologic: AAOx3,  no focal deficits       Reviewed Study(s):    Venous reflux ultrasound from January 2, 2024  -Predominantly deep venous reflux  -Chronic changes on the popliteal to proximal femoral veins     Iliac duplex 12/19/2023  -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  Referral to lymphedema therapy  -Recommended to start wearing compression stockings 30 to 40 mmHg pressure  -Patient will also start taking the venotonic supplement  -Will continue to follow for clinical improvement      Jasmin Samuels MD [facility]  Interventional Cardiology  Endovascular  Madhav [email or web address]    **Disclaimer: This note was dictated by speech recognition, and every effort has been made to prevent any error in transcription, however minor errors may be present**

## 2024-08-07 ENCOUNTER — APPOINTMENT (OUTPATIENT)
Dept: SLEEP MEDICINE | Facility: CLINIC | Age: 39
End: 2024-08-07
Payer: COMMERCIAL

## 2024-08-12 ENCOUNTER — DOCUMENTATION (OUTPATIENT)
Dept: PULMONOLOGY | Facility: HOSPITAL | Age: 39
End: 2024-08-12
Payer: COMMERCIAL

## 2024-08-12 NOTE — PROGRESS NOTES
Called patient to have her call [facility] for delivery for Opsumit. Patient verbalized understanding of plan.

## 2024-09-03 ENCOUNTER — LAB (OUTPATIENT)
Dept: LAB | Facility: LAB | Age: 39
End: 2024-09-03
Payer: COMMERCIAL

## 2024-09-03 DIAGNOSIS — Z79.899 HIGH RISK MEDICATION USE: ICD-10-CM

## 2024-09-03 LAB — HCG UR QL IA.RAPID: NEGATIVE

## 2024-09-03 PROCEDURE — 81025 URINE PREGNANCY TEST: CPT

## 2024-09-05 DIAGNOSIS — I27.24 CTEPH (CHRONIC THROMBOEMBOLIC PULMONARY HYPERTENSION) (MULTI): ICD-10-CM

## 2024-09-05 RX ORDER — TADALAFIL 20 MG/1
40 TABLET ORAL DAILY
Qty: 60 TABLET | Refills: 11 | Status: SHIPPED | OUTPATIENT
Start: 2024-09-05

## 2024-09-17 DIAGNOSIS — I27.24 CTEPH (CHRONIC THROMBOEMBOLIC PULMONARY HYPERTENSION): ICD-10-CM

## 2024-09-18 RX ORDER — TADALAFIL 20 MG/1
40 TABLET ORAL DAILY
Qty: 60 TABLET | Refills: 11 | Status: SHIPPED | OUTPATIENT
Start: 2024-09-18

## 2024-10-08 ENCOUNTER — LAB (OUTPATIENT)
Dept: LAB | Facility: LAB | Age: 39
End: 2024-10-08
Payer: COMMERCIAL

## 2024-10-08 DIAGNOSIS — Z79.899 HIGH RISK MEDICATION USE: ICD-10-CM

## 2024-10-08 LAB — HCG UR QL IA.RAPID: NEGATIVE

## 2024-10-08 PROCEDURE — 81025 URINE PREGNANCY TEST: CPT

## 2024-10-26 ENCOUNTER — APPOINTMENT (OUTPATIENT)
Dept: RADIOLOGY | Facility: HOSPITAL | Age: 39
End: 2024-10-26
Payer: COMMERCIAL

## 2024-10-26 ENCOUNTER — HOSPITAL ENCOUNTER (EMERGENCY)
Facility: HOSPITAL | Age: 39
Discharge: HOME | End: 2024-10-26
Payer: COMMERCIAL

## 2024-10-26 ENCOUNTER — HOSPITAL ENCOUNTER (OUTPATIENT)
Dept: CARDIOLOGY | Facility: HOSPITAL | Age: 39
Discharge: HOME | End: 2024-10-26
Payer: COMMERCIAL

## 2024-10-26 VITALS
HEIGHT: 63 IN | RESPIRATION RATE: 20 BRPM | WEIGHT: 185 LBS | DIASTOLIC BLOOD PRESSURE: 64 MMHG | HEART RATE: 90 BPM | BODY MASS INDEX: 32.78 KG/M2 | SYSTOLIC BLOOD PRESSURE: 112 MMHG | OXYGEN SATURATION: 95 %

## 2024-10-26 DIAGNOSIS — J45.21 MILD INTERMITTENT ASTHMA WITH EXACERBATION (HHS-HCC): Primary | ICD-10-CM

## 2024-10-26 LAB
ANION GAP SERPL CALC-SCNC: 10 MMOL/L (ref 10–20)
BASOPHILS # BLD AUTO: 0.04 X10*3/UL (ref 0–0.1)
BASOPHILS NFR BLD AUTO: 0.6 %
BNP SERPL-MCNC: 76 PG/ML (ref 0–99)
BUN SERPL-MCNC: 10 MG/DL (ref 6–23)
CALCIUM SERPL-MCNC: 9.2 MG/DL (ref 8.6–10.3)
CARDIAC TROPONIN I PNL SERPL HS: 4 NG/L (ref 0–13)
CHLORIDE SERPL-SCNC: 106 MMOL/L (ref 98–107)
CO2 SERPL-SCNC: 28 MMOL/L (ref 21–32)
CREAT SERPL-MCNC: 0.64 MG/DL (ref 0.5–1.05)
EGFRCR SERPLBLD CKD-EPI 2021: >90 ML/MIN/1.73M*2
EOSINOPHIL # BLD AUTO: 0.31 X10*3/UL (ref 0–0.7)
EOSINOPHIL NFR BLD AUTO: 4.4 %
ERYTHROCYTE [DISTWIDTH] IN BLOOD BY AUTOMATED COUNT: 13.4 % (ref 11.5–14.5)
GLUCOSE SERPL-MCNC: 106 MG/DL (ref 74–99)
HCT VFR BLD AUTO: 41.3 % (ref 36–46)
HGB BLD-MCNC: 13.4 G/DL (ref 12–16)
HOLD SPECIMEN: NORMAL
HOLD SPECIMEN: NORMAL
IMM GRANULOCYTES # BLD AUTO: 0.01 X10*3/UL (ref 0–0.7)
IMM GRANULOCYTES NFR BLD AUTO: 0.1 % (ref 0–0.9)
LYMPHOCYTES # BLD AUTO: 0.83 X10*3/UL (ref 1.2–4.8)
LYMPHOCYTES NFR BLD AUTO: 11.7 %
MCH RBC QN AUTO: 30 PG (ref 26–34)
MCHC RBC AUTO-ENTMCNC: 32.4 G/DL (ref 32–36)
MCV RBC AUTO: 92 FL (ref 80–100)
MONOCYTES # BLD AUTO: 0.58 X10*3/UL (ref 0.1–1)
MONOCYTES NFR BLD AUTO: 8.2 %
NEUTROPHILS # BLD AUTO: 5.32 X10*3/UL (ref 1.2–7.7)
NEUTROPHILS NFR BLD AUTO: 75 %
NRBC BLD-RTO: 0 /100 WBCS (ref 0–0)
PLATELET # BLD AUTO: 262 X10*3/UL (ref 150–450)
POTASSIUM SERPL-SCNC: 3.9 MMOL/L (ref 3.5–5.3)
RBC # BLD AUTO: 4.47 X10*6/UL (ref 4–5.2)
SODIUM SERPL-SCNC: 140 MMOL/L (ref 136–145)
WBC # BLD AUTO: 7.1 X10*3/UL (ref 4.4–11.3)

## 2024-10-26 PROCEDURE — 96374 THER/PROPH/DIAG INJ IV PUSH: CPT

## 2024-10-26 PROCEDURE — 71045 X-RAY EXAM CHEST 1 VIEW: CPT

## 2024-10-26 PROCEDURE — 2500000002 HC RX 250 W HCPCS SELF ADMINISTERED DRUGS (ALT 637 FOR MEDICARE OP, ALT 636 FOR OP/ED): Performed by: EMERGENCY MEDICINE

## 2024-10-26 PROCEDURE — 71045 X-RAY EXAM CHEST 1 VIEW: CPT | Mod: FOREIGN READ | Performed by: RADIOLOGY

## 2024-10-26 PROCEDURE — 94640 AIRWAY INHALATION TREATMENT: CPT

## 2024-10-26 PROCEDURE — 94664 DEMO&/EVAL PT USE INHALER: CPT | Mod: 59

## 2024-10-26 PROCEDURE — 84484 ASSAY OF TROPONIN QUANT: CPT | Performed by: EMERGENCY MEDICINE

## 2024-10-26 PROCEDURE — 83880 ASSAY OF NATRIURETIC PEPTIDE: CPT | Performed by: EMERGENCY MEDICINE

## 2024-10-26 PROCEDURE — 36415 COLL VENOUS BLD VENIPUNCTURE: CPT | Performed by: EMERGENCY MEDICINE

## 2024-10-26 PROCEDURE — 2500000004 HC RX 250 GENERAL PHARMACY W/ HCPCS (ALT 636 FOR OP/ED): Performed by: EMERGENCY MEDICINE

## 2024-10-26 PROCEDURE — 99285 EMERGENCY DEPT VISIT HI MDM: CPT | Mod: 25

## 2024-10-26 PROCEDURE — 85025 COMPLETE CBC W/AUTO DIFF WBC: CPT | Performed by: EMERGENCY MEDICINE

## 2024-10-26 PROCEDURE — 93005 ELECTROCARDIOGRAM TRACING: CPT

## 2024-10-26 PROCEDURE — 80048 BASIC METABOLIC PNL TOTAL CA: CPT | Performed by: EMERGENCY MEDICINE

## 2024-10-26 PROCEDURE — 94762 N-INVAS EAR/PLS OXIMTRY CONT: CPT

## 2024-10-26 PROCEDURE — 9420000001 HC RT PATIENT EDUCATION 5 MIN

## 2024-10-26 PROCEDURE — 2500000005 HC RX 250 GENERAL PHARMACY W/O HCPCS: Performed by: PHYSICIAN ASSISTANT

## 2024-10-26 RX ORDER — PREDNISONE 20 MG/1
40 TABLET ORAL DAILY
Qty: 10 TABLET | Refills: 0 | Status: SHIPPED | OUTPATIENT
Start: 2024-10-26 | End: 2024-10-31

## 2024-10-26 RX ORDER — PREDNISONE 20 MG/1
40 TABLET ORAL ONCE
Status: COMPLETED | OUTPATIENT
Start: 2024-10-26 | End: 2024-10-26

## 2024-10-26 RX ORDER — IPRATROPIUM BROMIDE AND ALBUTEROL SULFATE 2.5; .5 MG/3ML; MG/3ML
3 SOLUTION RESPIRATORY (INHALATION) ONCE
Status: COMPLETED | OUTPATIENT
Start: 2024-10-26 | End: 2024-10-26

## 2024-10-26 RX ADMIN — IPRATROPIUM BROMIDE AND ALBUTEROL SULFATE 3 ML: 2.5; .5 SOLUTION RESPIRATORY (INHALATION) at 22:02

## 2024-10-26 RX ADMIN — Medication 2 L/MIN: at 21:12

## 2024-10-26 RX ADMIN — Medication 2 L/MIN: at 22:02

## 2024-10-26 RX ADMIN — IPRATROPIUM BROMIDE AND ALBUTEROL SULFATE 3 ML: 2.5; .5 SOLUTION RESPIRATORY (INHALATION) at 21:07

## 2024-10-26 RX ADMIN — IPRATROPIUM BROMIDE AND ALBUTEROL SULFATE 3 ML: 2.5; .5 SOLUTION RESPIRATORY (INHALATION) at 21:19

## 2024-10-26 RX ADMIN — PREDNISONE 40 MG: 20 TABLET ORAL at 22:33

## 2024-10-26 RX ADMIN — METHYLPREDNISOLONE SODIUM SUCCINATE 125 MG: 125 INJECTION, POWDER, FOR SOLUTION INTRAMUSCULAR; INTRAVENOUS at 21:29

## 2024-10-26 ASSESSMENT — PAIN - FUNCTIONAL ASSESSMENT: PAIN_FUNCTIONAL_ASSESSMENT: 0-10

## 2024-10-26 ASSESSMENT — PAIN SCALES - GENERAL: PAINLEVEL_OUTOF10: 0 - NO PAIN

## 2024-10-27 NOTE — DISCHARGE INSTRUCTIONS
Please feel free to return to the emergency department if your symptoms return or if you have any additional concerns.

## 2024-10-27 NOTE — ED PROVIDER NOTES
"Patient is a 39-year-old female past medical history of pulmonary arterial hypertension, myasthenia gravis, asthma presents with a chief complaint of shortness of breath starting today.  She does have inhalers for her asthma which she has been using without success.  Reports she started having \"cold symptoms\" about 3 days ago.  Denies any fevers or chills.  No anterior chest pain or radiating symptoms to extremity neck or jaw.  She has oxygen at home that she uses as needed, it was not helping today.         Review of Systems     Physical Exam  Vitals and nursing note reviewed.   Constitutional:       General: She is not in acute distress.     Appearance: She is well-developed.   HENT:      Head: Normocephalic and atraumatic.   Eyes:      Conjunctiva/sclera: Conjunctivae normal.   Cardiovascular:      Rate and Rhythm: Normal rate and regular rhythm.      Heart sounds: No murmur heard.  Pulmonary:      Comments: Diffuse wheezes  Abdominal:      Palpations: Abdomen is soft.      Tenderness: There is no abdominal tenderness.   Musculoskeletal:         General: No swelling.      Cervical back: Neck supple.   Skin:     General: Skin is warm and dry.      Capillary Refill: Capillary refill takes less than 2 seconds.   Neurological:      Mental Status: She is alert.   Psychiatric:         Mood and Affect: Mood normal.          Labs Reviewed   CBC WITH AUTO DIFFERENTIAL - Abnormal       Result Value    WBC 7.1      nRBC 0.0      RBC 4.47      Hemoglobin 13.4      Hematocrit 41.3      MCV 92      MCH 30.0      MCHC 32.4      RDW 13.4      Platelets 262      Neutrophils % 75.0      Immature Granulocytes %, Automated 0.1      Lymphocytes % 11.7      Monocytes % 8.2      Eosinophils % 4.4      Basophils % 0.6      Neutrophils Absolute 5.32      Immature Granulocytes Absolute, Automated 0.01      Lymphocytes Absolute 0.83 (*)     Monocytes Absolute 0.58      Eosinophils Absolute 0.31      Basophils Absolute 0.04     BASIC " METABOLIC PANEL - Abnormal    Glucose 106 (*)     Sodium 140      Potassium 3.9      Chloride 106      Bicarbonate 28      Anion Gap 10      Urea Nitrogen 10      Creatinine 0.64      eGFR >90      Calcium 9.2     TROPONIN I, HIGH SENSITIVITY - Normal    Troponin I, High Sensitivity 4      Narrative:     Less than 99th percentile of normal range cutoff-  Female and children under 18 years old <14 ng/L; Male <21 ng/L: Negative  Repeat testing should be performed if clinically indicated.     Female and children under 18 years old 14-50 ng/L; Male 21-50 ng/L:  Consistent with possible cardiac damage and possible increased clinical   risk. Serial measurements may help to assess extent of myocardial damage.     >50 ng/L: Consistent with cardiac damage, increased clinical risk and  myocardial infarction. Serial measurements may help assess extent of   myocardial damage.      NOTE: Children less than 1 year old may have higher baseline troponin   levels and results should be interpreted in conjunction with the overall   clinical context.     NOTE: Troponin I testing is performed using a different   testing methodology at [facility] than at other   [facility]. Direct result comparisons should only   be made within the same method.   B-TYPE NATRIURETIC PEPTIDE - Normal    BNP 76      Narrative:        <100 pg/mL - Heart failure unlikely  100-299 pg/mL - Intermediate probability of acute heart                  failure exacerbation. Correlate with clinical                  context and patient history.    >=300 pg/mL - Heart Failure likely. Correlate with clinical                  context and patient history.    BNP testing is performed using different testing methodology at [facility] than at other [facility]. Direct result comparisons should only be made within the same method.           XR chest 1 view   Final Result   Previous sternotomy. Normal heart size. Prominent central pulmonary  "  arteries. Consider pulmonary arterial hypertension. No acute   infiltrates or effusions.   Signed by Babak Hebert MD           Procedures     Medical Decision Making  Patient is a 39-year-old female past medical history of pulmonary arterial hypertension, myasthenia gravis, asthma presents with a chief complaint of shortness of breath starting today.  She does have inhalers for her asthma which she has been using without success.  Reports she started having \"cold symptoms\" about 3 days ago.  Denies any fevers or chills.  No anterior chest pain or radiating symptoms to extremity neck or jaw.  She has oxygen at home that she uses as needed, it was not helping today.  Chest x-ray was negative for acute findings.  It did demonstrate endings consistent with pulmonary hypertension.  No pneumonia.  I believe she had a viral illness that is causing her asthma to flareup.  Patient has normal white blood cell count, is afebrile, nontachycardic and currently a pulse ox of 95%.  She received DuoNeb nebulizer treatments x 3 and Solu-Medrol 125.  She is feeling better.  On exam the wheezes have diminished significantly.  She feels comfortable going home.    DDx: Pneumonia, heart failure, viral illness, asthma, COPD, reactive airway disease    Amount and/or Complexity of Data Reviewed  ECG/medicine tests: independent interpretation performed.     Details: Sinus rhythm rate of 86, narrow complex, normal axis, no ST elevation or depression, no ectopy.  T wave inversion in V2 is present on previous EKGs.         Diagnoses as of 10/26/24 2229   Mild intermittent asthma with exacerbation ([facility])                    Wilder Lopez MD  10/26/24 2229    "

## 2024-11-01 LAB
ATRIAL RATE: 86 BPM
P AXIS: 62 DEGREES
P OFFSET: 186 MS
P ONSET: 132 MS
PR INTERVAL: 180 MS
Q ONSET: 222 MS
QRS COUNT: 14 BEATS
QRS DURATION: 94 MS
QT INTERVAL: 364 MS
QTC CALCULATION(BAZETT): 435 MS
QTC FREDERICIA: 410 MS
R AXIS: 13 DEGREES
T AXIS: 52 DEGREES
T OFFSET: 404 MS
VENTRICULAR RATE: 86 BPM

## 2024-11-07 ENCOUNTER — APPOINTMENT (OUTPATIENT)
Dept: PRIMARY CARE | Facility: CLINIC | Age: 39
End: 2024-11-07
Payer: COMMERCIAL

## 2024-11-07 VITALS
HEIGHT: 63 IN | WEIGHT: 204 LBS | DIASTOLIC BLOOD PRESSURE: 80 MMHG | HEART RATE: 76 BPM | BODY MASS INDEX: 36.14 KG/M2 | SYSTOLIC BLOOD PRESSURE: 120 MMHG

## 2024-11-07 DIAGNOSIS — I27.82 CHRONIC PULMONARY EMBOLISM, UNSPECIFIED PULMONARY EMBOLISM TYPE, UNSPECIFIED WHETHER ACUTE COR PULMONALE PRESENT (MULTI): ICD-10-CM

## 2024-11-07 DIAGNOSIS — I27.24 CHRONIC THROMBOEMBOLIC PULMONARY HYPERTENSION: ICD-10-CM

## 2024-11-07 DIAGNOSIS — Z99.81 DEPENDENCE ON SUPPLEMENTAL OXYGEN: ICD-10-CM

## 2024-11-07 DIAGNOSIS — J30.2 SEASONAL ALLERGIES: Primary | ICD-10-CM

## 2024-11-07 DIAGNOSIS — Z23 NEED FOR INFLUENZA VACCINATION: ICD-10-CM

## 2024-11-07 DIAGNOSIS — F41.9 ANXIETY DISORDER, UNSPECIFIED TYPE: ICD-10-CM

## 2024-11-07 DIAGNOSIS — I82.511 CHRONIC DEEP VEIN THROMBOSIS (DVT) OF FEMORAL VEIN OF RIGHT LOWER EXTREMITY (MULTI): ICD-10-CM

## 2024-11-07 DIAGNOSIS — I51.7 CARDIOMEGALY: ICD-10-CM

## 2024-11-07 DIAGNOSIS — I27.24 CTEPH (CHRONIC THROMBOEMBOLIC PULMONARY HYPERTENSION): ICD-10-CM

## 2024-11-07 PROCEDURE — 90471 IMMUNIZATION ADMIN: CPT | Performed by: INTERNAL MEDICINE

## 2024-11-07 PROCEDURE — 1036F TOBACCO NON-USER: CPT | Performed by: INTERNAL MEDICINE

## 2024-11-07 PROCEDURE — 90656 IIV3 VACC NO PRSV 0.5 ML IM: CPT | Performed by: INTERNAL MEDICINE

## 2024-11-07 PROCEDURE — 99214 OFFICE O/P EST MOD 30 MIN: CPT | Performed by: INTERNAL MEDICINE

## 2024-11-07 PROCEDURE — 3008F BODY MASS INDEX DOCD: CPT | Performed by: INTERNAL MEDICINE

## 2024-11-07 RX ORDER — MONTELUKAST SODIUM 10 MG/1
10 TABLET ORAL NIGHTLY
Qty: 90 TABLET | Refills: 3 | Status: SHIPPED | OUTPATIENT
Start: 2024-11-07

## 2024-11-07 RX ORDER — FLUTICASONE PROPIONATE 50 MCG
1 SPRAY, SUSPENSION (ML) NASAL ONCE AS NEEDED
Qty: 16 G | Refills: 11 | Status: SHIPPED | OUTPATIENT
Start: 2024-11-07

## 2024-11-07 ASSESSMENT — ENCOUNTER SYMPTOMS
SINUS PRESSURE: 0
NAUSEA: 0
WHEEZING: 0
APPETITE CHANGE: 0
NUMBNESS: 0
SORE THROAT: 0
ACTIVITY CHANGE: 0
SINUS PAIN: 0
DIARRHEA: 0
SHORTNESS OF BREATH: 0
BACK PAIN: 0
VOMITING: 0
ABDOMINAL DISTENTION: 0
CHILLS: 0
FATIGUE: 0
WEAKNESS: 0
COUGH: 0

## 2024-11-07 NOTE — PROGRESS NOTES
"Subjective   Patient ID: Alda Chaney is a 39 y.o. female who presents for Follow-up (6 month) and Allergies (Seasonal allergies have been really bad/Tx: Nasal spray; Singulair and OTC medications  ).  HPI  Patient is here today for 6 mo follow up       Patient  reports that her allergies have been very bothesome.   Is out of singulair rx and requesting refill.     Requesting flu shot today.     Review of Systems   Constitutional:  Negative for activity change, appetite change, chills and fatigue.   HENT:  Negative for congestion, postnasal drip, sinus pressure, sinus pain and sore throat.    Respiratory:  Negative for cough, shortness of breath and wheezing.    Cardiovascular:  Negative for chest pain and leg swelling.   Gastrointestinal:  Negative for abdominal distention, diarrhea, nausea and vomiting.   Musculoskeletal:  Negative for back pain.   Neurological:  Negative for weakness and numbness.       Objective   /80   Pulse 76   Ht 1.6 m (5' 3\")   Wt 92.5 kg (204 lb)   BMI 36.14 kg/m²     Physical Exam  Constitutional:       General: She is not in acute distress.     Appearance: Normal appearance.   HENT:      Head: Normocephalic.      Nose: Nose normal.      Mouth/Throat:      Pharynx: No oropharyngeal exudate.   Eyes:      General:         Right eye: No discharge.         Left eye: No discharge.      Extraocular Movements: Extraocular movements intact.      Pupils: Pupils are equal, round, and reactive to light.   Cardiovascular:      Rate and Rhythm: Normal rate and regular rhythm.      Heart sounds: No murmur heard.     No gallop.   Pulmonary:      Effort: Pulmonary effort is normal. No respiratory distress.      Breath sounds: Normal breath sounds. No wheezing.   Musculoskeletal:         General: No swelling. Normal range of motion.   Skin:     General: Skin is warm and dry.      Coloration: Skin is not jaundiced.   Neurological:      General: No focal deficit present.      Mental Status: She " is alert and oriented to person, place, and time.      Cranial Nerves: No cranial nerve deficit.   Psychiatric:         Mood and Affect: Mood normal.         Behavior: Behavior normal.           Assessment/Plan   Problem List Items Addressed This Visit       CTEPH (chronic thromboembolic pulmonary hypertension)    DVT (deep venous thrombosis) (Multi)    Pulmonary embolism    Cardiomegaly    Anxiety disorder    Dependence on supplemental oxygen     Other Visit Diagnoses       Seasonal allergies    -  Primary    Relevant Medications    montelukast (Singulair) 10 mg tablet    fluticasone (Flonase) 50 mcg/actuation nasal spray    Chronic thromboembolic pulmonary hypertension        Relevant Medications    rivaroxaban (Xarelto) 20 mg tablet    Need for influenza vaccination        Relevant Orders    Flu vaccine, trivalent, preservative free, age 6 months and greater (Fluarix/Fluzone/Flulaval) (Completed)        Immunizations   Flu shot requesting today   COVID declines   PNA --   Shingles --   RSV --     Mammo [facility] in [place]   Pap 3/24   DEXA   Colon cancer screening 2023    Asthma   - continue albuterol inhaler prn   - continue Symbicort   - continue allegra   - sent in singulair      2. Recurrent DVT and PT with CTEPH, stable    - following with vascular and pulmonology   - continue cellcept  - continue tadalafil   - xarelto   - Opsumit and uptravi  - 2L o2 prn and nocturnal      3. Apnea, snoring   - will order in lab sleep study with her other comorbidities including CTEPH certainly makes her higher risk for worsening of her pulmonary htn, if she has untreated CINDY. Pt has apnea and snoring, obesity, has never had a sleep study in the past. (Has not been able to do because of her schedule)      4. Doing well s/p uterine ablation     Final diagnoses:   [I27.24] Chronic thromboembolic pulmonary hypertension   [J30.2] Seasonal allergies   [Z23] Need for influenza vaccination   [I27.24] CTEPH (chronic  thromboembolic pulmonary hypertension)   [I51.7] Cardiomegaly   [I82.511] Chronic deep vein thrombosis (DVT) of femoral vein of right lower extremity (Multi)   [I27.82] Chronic pulmonary embolism, unspecified pulmonary embolism type, unspecified whether acute cor pulmonale present (Multi)   [F41.9] Anxiety disorder, unspecified type   [Z99.81] Dependence on supplemental oxygen

## 2024-11-13 DIAGNOSIS — J01.90 ACUTE NON-RECURRENT SINUSITIS, UNSPECIFIED LOCATION: ICD-10-CM

## 2024-11-13 RX ORDER — PREDNISONE 20 MG/1
TABLET ORAL
Qty: 18 TABLET | Refills: 0 | Status: SHIPPED | OUTPATIENT
Start: 2024-11-13

## 2024-11-13 RX ORDER — AMOXICILLIN 875 MG/1
875 TABLET, FILM COATED ORAL 2 TIMES DAILY
Qty: 20 TABLET | Refills: 0 | Status: SHIPPED | OUTPATIENT
Start: 2024-11-13 | End: 2024-11-23

## 2024-11-13 NOTE — PROGRESS NOTES
History Of Present Illness  Alda Chaney is a 39 y.o. female presenting with CTEPH follow up. Patient is NYHA Functional Class 1-2 and WHO Group 4. Her last visit was 7/10/2024. She has been with our clinic since 10/2012.     PAH Treatment:  Opsumit 10 mg daily (11/30/2019) restarted (8/12/2024)  Uptravi, 400/600 mcg (3/17/2021)  Tadalafil 40 mg daily (10/2013)  Xarelto 20 mg daily  Oxygen 2 LPM HS  Infusion site: N/A  Treatment history:   Revatio (2013) non compliance    Today's testing includes 6 MWT and labs    Interval History   10/26 ED Visit, shortness of breath due to viral illness    Past Medical History  Patient Active Problem List   Diagnosis    Asthma    Complicated migraine    Cough with sputum    CTEPH (chronic thromboembolic pulmonary hypertension)    DVT (deep venous thrombosis) (Multi)    Exertional shortness of breath    Limb pain    Myasthenia gravis, acetylcholine receptor antibody positive (Multi)    Obesity    Pulmonary embolism    Pulmonary vein injury    Seasonal allergic reaction    Secondary hypercoagulable state (Multi)    Shoulder pain    URI (upper respiratory infection)    Anal fissure    BRBPR (bright red blood per rectum)    Cardiomegaly    Anxiety disorder    Swelling of right lower extremity    Residual hemorrhoidal skin tags    Rectal pain    Postthrombotic syndrome of right lower extremity    Pain of right lower extremity    Localized edema    Diverticulosis of large intestine    Furuncle    Dependence on supplemental oxygen    Hypertropia of left eye    Hypertropia of right eye    Post-thrombotic syndrome of right lower extremity        Surgical History  She has a past surgical history that includes Eye surgery (08/28/2013); Mouth surgery (08/28/2013); Other surgical history (12/16/2022); Thymectomy (08/31/2017); Other surgical history (02/09/2017); MR angio head w and wo IV contrast (04/16/2014); MR angio chest w IV contrast (01/23/2018); IR CVC tunneled (09/27/2016); MR angio  chest w IV contrast (09/28/2012); Cardiac catheterization (N/A, 03/28/2024); Dilation and curettage of uterus (06/21/2024); and Endometrial ablation (06/21/2024).     Social History  She reports that she has never smoked. She has never been exposed to tobacco smoke. She has never used smokeless tobacco. She reports that she does not currently use alcohol. She reports that she does not use drugs.    Family History  Family History   Problem Relation Name Age of Onset    Other (diabetes mellitus) Mother      Hypertension Mother      Cervical cancer Mother      Heart attack Father      Other (diabetes mellitus) Father      Heart disease Father      Other (Adopted) Father          Medications  Current Outpatient Medications:     acetaminophen (TylenoL) 325 mg tablet, Take by mouth. TAKE 1 TO 2 TABLETS EVERY 4 HOURS AS NEEDED, Disp: , Rfl:     albuterol 90 mcg/actuation inhaler, TAKE 1 PUFF BY MOUTH EVERY 4 HOURS AS NEEDED, Disp: 8.5 g, Rfl: 11    amoxicillin (Amoxil) 875 mg tablet, Take 1 tablet (875 mg) by mouth 2 times a day for 10 days., Disp: 20 tablet, Rfl: 0    budesonide-formoteroL (Symbicort) 160-4.5 mcg/actuation inhaler, Inhale 2 puffs 2 times a day. Rinse mouth with water after use to reduce aftertaste and incidence of candidiasis. Do not swallow., Disp: 10.2 g, Rfl: 2    calcium carbonate-vitamin D3 600 mg-10 mcg (400 unit) tablet, Take 2 tablets by mouth once daily. Takes infrequently, Disp: , Rfl:     fexofenadine (Allegra) 180 mg tablet, Take 1 tablet (180 mg) by mouth once daily., Disp: 90 tablet, Rfl: 1    fluticasone (Flonase) 50 mcg/actuation nasal spray, Administer 1 spray into each nostril 1 time if needed for allergies., Disp: 16 g, Rfl: 11    loperamide (Imodium A-D) 2 mg tablet, Take 1 tablet (2 mg) by mouth 4 times a day as needed for diarrhea., Disp: 30 tablet, Rfl: 11    macitentan (Opsumit) 10 mg tablet tablet, Take 1 tablet (10 mg) by mouth once daily., Disp: , Rfl:     montelukast  (Singulair) 10 mg tablet, Take 1 tablet (10 mg) by mouth once daily at bedtime., Disp: 90 tablet, Rfl: 3    NON FORMULARY, Take 2 each by mouth once daily. Vein supplement, Disp: , Rfl:     predniSONE (Deltasone) 20 mg tablet, Take 3 tabs (60mg) daily for 3 days, then take 2 tabs (40mg) daily for 3 days, then take 1 tab (20mg) daily for 3 days., Disp: 18 tablet, Rfl: 0    pyridostigmine (Mestinon) 60 mg tablet, Take 2 tablets (120 mg) by mouth once daily., Disp: , Rfl:     rivaroxaban (Xarelto) 20 mg tablet, Take 1 tablet (20 mg) by mouth once daily., Disp: 90 tablet, Rfl: 3    selexipag (Uptravi) 200 mcg tablet, Take 2 tablets (400 mcg) by mouth once daily. 400 mg in AM and 600 mg in PM, Disp: , Rfl:     tadalafil, antihypertensive, 20 mg tablet, TAKE 2 TABLETS DAILY, Disp: 60 tablet, Rfl: 11    triamcinolone (Kenalog) 0.1 % cream, Apply topically 2 times a day. Apply to affected area 1-2 times daily as needed. Avoid face and groin. (Patient not taking: Reported on 11/7/2024), Disp: 30 g, Rfl: 0     Allergies  Bee venom protein (honey bee), Ciprofloxacin, Levofloxacin, Magnesium, Magnesium chloride, Magnesium sulfate, and Hibiclens [chlorhexidine gluconate]    Review of Systems    Last Recorded Vitals  There were no vitals taken for this visit.     Physical Exam       Relevant Results    6MWT (11/25/2024)  SP02-  HR-  DANIS-  Actual Meters-     Echo (7/10/2024)  Left Ventricle: Left ventricular ejection fraction is normal, by visual estimate at 55-60%. There are no regional wall motion abnormalities. The left ventricular cavity size is normal. Spectral Doppler shows a normal pattern of left ventricular diastolic filling.  Left Atrium: The left atrium is normal in size.  Right Ventricle: The right ventricle is upper limits of normal in size. There is low normal right ventricular systolic function.  Right Atrium: The right atrium is mildly dilated.  Aortic Valve: The aortic valve is trileaflet. There is no evidence of  aortic valve regurgitation. The peak instantaneous gradient of the aortic valve is 4.4 mmHg.  Mitral Valve: The mitral valve is normal in structure. There is no evidence of mitral valve regurgitation.  Tricuspid Valve: The tricuspid valve is structurally normal. There is trace tricuspid regurgitation. The right ventricular systolic pressure is unable to be estimated.  Pulmonic Valve: The pulmonic valve is structurally normal. There is physiologic pulmonic valve regurgitation.  Pericardium: There is no pericardial effusion noted.  Aorta: The aortic root is normal.  Systemic Veins: The inferior vena cava size appears small. There is IVC inspiratory collapse greater than 50%.  In comparison to the previous echocardiogram(s): Compared with study dated 2022,.     CONCLUSIONS:   1. Poorly visualized anatomical structures due to suboptimal image quality.   2. Left ventricular ejection fraction is normal, by visual estimate at 55-60%.   3. There is low normal right ventricular systolic function.    6MWT (7/10/2024)  SP02- 98- 93% RA  HR-   REUBEN- 0-3  Actual Meters 408     RHC (3/28/2024)  PAP- 40/18 (27)  PCWP- 13  CO/CI- 2.7/5.2     6MWT (1/15/2024)  HR 64 - 133  O2 94 - 86 on RA  Reuben 0 - 4  Meters 465     6MWT (2023)  SpO2: 96% - 89% on RA  HR: 74 - 139  Reuben: 0 - 3  Actual 466m     6MWT (2023)  SpO2: 97-88% RA  HR:   Reuben: 0-3  Actual meters: 457    Echo (2023)   Left Ventricle: Left ventricular systolic function is normal, with an estimated ejection fraction of 60%. There are no regional wall motion abnormalities. The left ventricular cavity size is normal. Spectral Doppler shows a pseudonormal pattern of left ventricular diastolic filling.  Left Atrium: The left atrium is normal in size. A bubble study using agitated saline was performed. Bubble study is negative.  Right Ventricle: The right ventricle is upper limits of normal in size. There is reduced right ventricular systolic  function.  Right Atrium: The right atrium is normal in size.  Aortic Valve: The aortic valve is trileaflet. There is no evidence of aortic valve regurgitation. The peak instantaneous gradient of the aortic valve is 8.0 mmHg. The mean gradient of the aortic valve is 4.0 mmHg.  Mitral Valve: The mitral valve is normal in structure. There is no evidence of mitral valve regurgitation.  Tricuspid Valve: The tricuspid valve is structurally normal. No evidence of tricuspid regurgitation.  Pulmonic Valve: The pulmonic valve is not well visualized. There is no indication of pulmonic valve regurgitation.  Pericardium: There is no pericardial effusion noted.  Aorta: The aortic root is normal.  Systemic Veins: The inferior vena cava appears to be of normal size. There is IVC inspiratory collapse greater than 50%.  CONCLUSIONS:  1. Left ventricular systolic function is normal with a 60% estimated ejection fraction.  2. Spectral Doppler shows a pseudonormal pattern of left ventricular diastolic filling.  3. There is reduced right ventricular systolic function.     6MWT (4/3/2023)  SpO2: 97% - 91% on RA  HR: 72 - 135  Reuben: 0 - 3  Actual 450m     Echo (3/12/2022)   Normal size RV with mildly reduced function, normal size RA     Pulmonary angiogram (2019)   with Dr. Veronica - attempted left pulmonary artery angioplasty.  Pulmonary angioplasty (2019)   with Dr. Veronica - balloon angio to distal left main, pulmonary artery and segmental branches to the lingula and left lower lobe.     Assessment/Plan     1) Pulmonary   a. CTED limited mostly to right lung - near normal RV - unchanged exercise ability FC II. Known previous desat with significant exercise, prescribed oxygen, will not use. 2015 stress echo with maintained RVSP , TAPSE and RV size at peak exertion. Echo 10/23/2017 near normal to mild RV, likely unchanged. Cath last year 2017 with resting mPAP of 35 mm Hg. 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 Uptravi started, significant side effects because she inappropriately escalated dosing without reviewing or contacting physician office as instructed. we then slowly increase dose and only change after direct consultation with pulmonary vascular office. Also with pulmonary  vein stenosis as part of the same process.      1/10/2022 - taking meds, no edema tolerating 400/400 ug selexipag.      9/12/2022 - stopped taking PH meds because she is in [place]. Still taking anticoagulation. Echo unchanged with muscular RV but normal size, unchanged compared to 2021.     4/3/2023 - Last seen September 2022 with normal echo but had interrupted meds due to insurance issues. She has not worked to clear this up but did not tell us. Today has insurance approval but has not restarted. Will restart. triple therapy sequentially.      9/25/2023 - (+) 9 m , unchanged drop in SpO2 to 89%, using meds, resistant to uptitration of selexipag due to work.     1/15/2024 - Specialty pharmacy claims no delivery since July 2023 for Uptravi. Patient claims she has significant previously delivered drug which she is using, this is possible given dose, etc. Patient unable to uptitrate past 600 BID due to diarrhea, headache. May have to decrease to 600/400 unless sx decrease over next 1-2 weeks. Walk similar but desaturation to 86%, recommend use of ambulatory oxygen.      b. Desaturation due to CTED and dead space- still SpO2 = 91% on room air.      c. 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 This Limits possibilities for PTE. Angio 4/2019 suggested improvement of this flow and  "patient underwent BPA with significant subjective improvement which has now waned. Unclear if this is due to restenosis or ?     d. Previously INcreasing wheezing and allergy symptoms, patient has had albuterol inhaler for long time but very, very rarely. Now using regular symbicort, no nocturnal symptoms.      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      3) Obesity with increased weight, BMI = 35.5-> 36.9->37-.37.6     4) COVID infection, minor dyspnea, no oxygen , stayed home, no CXR feels \"normal\" now.    Plan    1) No change in meds.  2) Continue anticoagulation.   3) Weight loss.   4) No uptitration of selexipag at this time given symptoms. currently at 400/600. Side effects only diarrhea which is well controlled on imodium. No  change in selexipag for 1 month, patient to call then and we will reassess dosing.        "

## 2024-11-18 ENCOUNTER — APPOINTMENT (OUTPATIENT)
Dept: PULMONOLOGY | Facility: HOSPITAL | Age: 39
End: 2024-11-18
Payer: COMMERCIAL

## 2024-11-18 ENCOUNTER — APPOINTMENT (OUTPATIENT)
Dept: RESPIRATORY THERAPY | Facility: HOSPITAL | Age: 39
End: 2024-11-18
Payer: COMMERCIAL

## 2024-11-22 ENCOUNTER — TELEPHONE (OUTPATIENT)
Dept: PULMONOLOGY | Facility: HOSPITAL | Age: 39
End: 2024-11-22
Payer: COMMERCIAL

## 2024-11-25 ENCOUNTER — APPOINTMENT (OUTPATIENT)
Dept: RESPIRATORY THERAPY | Facility: HOSPITAL | Age: 39
End: 2024-11-25
Payer: COMMERCIAL

## 2024-11-25 ENCOUNTER — APPOINTMENT (OUTPATIENT)
Dept: PULMONOLOGY | Facility: HOSPITAL | Age: 39
End: 2024-11-25
Payer: COMMERCIAL

## 2024-11-25 DIAGNOSIS — I27.20 PULMONARY HYPERTENSION (MULTI): ICD-10-CM

## 2024-11-25 DIAGNOSIS — I27.24 CTEPH (CHRONIC THROMBOEMBOLIC PULMONARY HYPERTENSION): ICD-10-CM

## 2024-11-25 DIAGNOSIS — Z79.899 LONG-TERM USE OF HIGH-RISK MEDICATION: ICD-10-CM

## 2024-12-19 NOTE — PROGRESS NOTES
History Of Present Illness  Alda Chaney is a 39 y.o. female presenting with CTEPH follow up. Patient is NYHA Functional Class 1-2 and WHO Group 4. Her last visit was 7/10/2024. She has been with our clinic since 10/2012.     PAH Treatment:  Opsumit 10 mg daily (11/30/2019) restarted (8/12/2024)  Uptravi, 400/600 mcg (3/17/2021)  Tadalafil 40 mg daily (10/2013)  Xarelto 20 mg daily  Oxygen 2 LPM HS  Infusion site: N/A  Treatment history:   Revatio (2013) non compliance    Today's testing includes 6 MWT and labs    Interval History   10/26 ED Visit, shortness of breath due to viral illness    Past Medical History  Patient Active Problem List   Diagnosis    Asthma    Complicated migraine    Cough with sputum    CTEPH (chronic thromboembolic pulmonary hypertension)    DVT (deep venous thrombosis) (Multi)    Exertional shortness of breath    Limb pain    Myasthenia gravis, acetylcholine receptor antibody positive (Multi)    Obesity    Pulmonary embolism    Pulmonary vein injury    Seasonal allergic reaction    Secondary hypercoagulable state (Multi)    Shoulder pain    URI (upper respiratory infection)    Anal fissure    BRBPR (bright red blood per rectum)    Cardiomegaly    Anxiety disorder    Swelling of right lower extremity    Residual hemorrhoidal skin tags    Rectal pain    Postthrombotic syndrome of right lower extremity    Pain of right lower extremity    Localized edema    Diverticulosis of large intestine    Furuncle    Dependence on supplemental oxygen    Hypertropia of left eye    Hypertropia of right eye    Post-thrombotic syndrome of right lower extremity        Surgical History  She has a past surgical history that includes Eye surgery (08/28/2013); Mouth surgery (08/28/2013); Other surgical history (12/16/2022); Thymectomy (08/31/2017); Other surgical history (02/09/2017); MR angio head w and wo IV contrast (04/16/2014); MR angio chest w IV contrast (01/23/2018); IR CVC tunneled (09/27/2016); MR angio  chest w IV contrast (09/28/2012); Cardiac catheterization (N/A, 03/28/2024); Dilation and curettage of uterus (06/21/2024); and Endometrial ablation (06/21/2024).     Social History  She reports that she has never smoked. She has never been exposed to tobacco smoke. She has never used smokeless tobacco. She reports that she does not currently use alcohol. She reports that she does not use drugs.    Family History  Family History   Problem Relation Name Age of Onset    Other (diabetes mellitus) Mother      Hypertension Mother      Cervical cancer Mother      Heart attack Father      Other (diabetes mellitus) Father      Heart disease Father      Other (Adopted) Father          Medications  Current Outpatient Medications:     acetaminophen (TylenoL) 325 mg tablet, Take by mouth. TAKE 1 TO 2 TABLETS EVERY 4 HOURS AS NEEDED, Disp: , Rfl:     albuterol 90 mcg/actuation inhaler, TAKE 1 PUFF BY MOUTH EVERY 4 HOURS AS NEEDED, Disp: 8.5 g, Rfl: 11    budesonide-formoteroL (Symbicort) 160-4.5 mcg/actuation inhaler, Inhale 2 puffs 2 times a day. Rinse mouth with water after use to reduce aftertaste and incidence of candidiasis. Do not swallow., Disp: 10.2 g, Rfl: 2    calcium carbonate-vitamin D3 600 mg-10 mcg (400 unit) tablet, Take 2 tablets by mouth once daily. Takes infrequently, Disp: , Rfl:     fexofenadine (Allegra) 180 mg tablet, Take 1 tablet (180 mg) by mouth once daily., Disp: 90 tablet, Rfl: 1    fluticasone (Flonase) 50 mcg/actuation nasal spray, Administer 1 spray into each nostril 1 time if needed for allergies., Disp: 16 g, Rfl: 11    loperamide (Imodium A-D) 2 mg tablet, Take 1 tablet (2 mg) by mouth 4 times a day as needed for diarrhea., Disp: 30 tablet, Rfl: 11    macitentan (Opsumit) 10 mg tablet tablet, Take 1 tablet (10 mg) by mouth once daily., Disp: , Rfl:     montelukast (Singulair) 10 mg tablet, Take 1 tablet (10 mg) by mouth once daily at bedtime., Disp: 90 tablet, Rfl: 3    NON FORMULARY, Take 2  each by mouth once daily. Vein supplement, Disp: , Rfl:     predniSONE (Deltasone) 20 mg tablet, Take 3 tabs (60mg) daily for 3 days, then take 2 tabs (40mg) daily for 3 days, then take 1 tab (20mg) daily for 3 days., Disp: 18 tablet, Rfl: 0    pyridostigmine (Mestinon) 60 mg tablet, Take 2 tablets (120 mg) by mouth once daily., Disp: , Rfl:     rivaroxaban (Xarelto) 20 mg tablet, Take 1 tablet (20 mg) by mouth once daily., Disp: 90 tablet, Rfl: 3    selexipag (Uptravi) 200 mcg tablet, Take 2 tablets (400 mcg) by mouth once daily. 400 mg in AM and 600 mg in PM, Disp: , Rfl:     tadalafil, antihypertensive, 20 mg tablet, TAKE 2 TABLETS DAILY, Disp: 60 tablet, Rfl: 11    triamcinolone (Kenalog) 0.1 % cream, Apply topically 2 times a day. Apply to affected area 1-2 times daily as needed. Avoid face and groin. (Patient not taking: Reported on 11/7/2024), Disp: 30 g, Rfl: 0     Allergies  Bee venom protein (honey bee), Ciprofloxacin, Levofloxacin, Magnesium, Magnesium chloride, Magnesium sulfate, and Hibiclens [chlorhexidine gluconate]    Review of Systems    Last Recorded Vitals  There were no vitals taken for this visit.     Physical Exam       Relevant Results    6MWT (1/8/2025)  SP02-  HR-  DANIS-  Actual Meters-     Echo (7/10/2024)  Left Ventricle: Left ventricular ejection fraction is normal, by visual estimate at 55-60%. There are no regional wall motion abnormalities. The left ventricular cavity size is normal. Spectral Doppler shows a normal pattern of left ventricular diastolic filling.  Left Atrium: The left atrium is normal in size.  Right Ventricle: The right ventricle is upper limits of normal in size. There is low normal right ventricular systolic function.  Right Atrium: The right atrium is mildly dilated.  Aortic Valve: The aortic valve is trileaflet. There is no evidence of aortic valve regurgitation. The peak instantaneous gradient of the aortic valve is 4.4 mmHg.  Mitral Valve: The mitral valve is  normal in structure. There is no evidence of mitral valve regurgitation.  Tricuspid Valve: The tricuspid valve is structurally normal. There is trace tricuspid regurgitation. The right ventricular systolic pressure is unable to be estimated.  Pulmonic Valve: The pulmonic valve is structurally normal. There is physiologic pulmonic valve regurgitation.  Pericardium: There is no pericardial effusion noted.  Aorta: The aortic root is normal.  Systemic Veins: The inferior vena cava size appears small. There is IVC inspiratory collapse greater than 50%.  In comparison to the previous echocardiogram(s): Compared with study dated 2022,.     CONCLUSIONS:   1. Poorly visualized anatomical structures due to suboptimal image quality.   2. Left ventricular ejection fraction is normal, by visual estimate at 55-60%.   3. There is low normal right ventricular systolic function.    6MWT (7/10/2024)  SP02- 98- 93% RA  HR-   REUBEN- 0-3  Actual Meters 408     RHC (3/28/2024)  PAP- 40/18 (27)  PCWP- 13  CO/CI- 2.7/5.2     6MWT (1/15/2024)  HR 64 - 133  O2 94 - 86 on RA  Reuben 0 - 4  Meters 465     6MWT (2023)  SpO2: 96% - 89% on RA  HR: 74 - 139  Reuben: 0 - 3  Actual 466m     6MWT (2023)  SpO2: 97-88% RA  HR:   Reuben: 0-3  Actual meters: 457    Echo (2023)   Left Ventricle: Left ventricular systolic function is normal, with an estimated ejection fraction of 60%. There are no regional wall motion abnormalities. The left ventricular cavity size is normal. Spectral Doppler shows a pseudonormal pattern of left ventricular diastolic filling.  Left Atrium: The left atrium is normal in size. A bubble study using agitated saline was performed. Bubble study is negative.  Right Ventricle: The right ventricle is upper limits of normal in size. There is reduced right ventricular systolic function.  Right Atrium: The right atrium is normal in size.  Aortic Valve: The aortic valve is trileaflet. There is no evidence of  aortic valve regurgitation. The peak instantaneous gradient of the aortic valve is 8.0 mmHg. The mean gradient of the aortic valve is 4.0 mmHg.  Mitral Valve: The mitral valve is normal in structure. There is no evidence of mitral valve regurgitation.  Tricuspid Valve: The tricuspid valve is structurally normal. No evidence of tricuspid regurgitation.  Pulmonic Valve: The pulmonic valve is not well visualized. There is no indication of pulmonic valve regurgitation.  Pericardium: There is no pericardial effusion noted.  Aorta: The aortic root is normal.  Systemic Veins: The inferior vena cava appears to be of normal size. There is IVC inspiratory collapse greater than 50%.  CONCLUSIONS:  1. Left ventricular systolic function is normal with a 60% estimated ejection fraction.  2. Spectral Doppler shows a pseudonormal pattern of left ventricular diastolic filling.  3. There is reduced right ventricular systolic function.     6MWT (4/3/2023)  SpO2: 97% - 91% on RA  HR: 72 - 135  Reuben: 0 - 3  Actual 450m     Echo (3/12/2022)   Normal size RV with mildly reduced function, normal size RA     Pulmonary angiogram (2019)   with Dr. Veronica - attempted left pulmonary artery angioplasty.  Pulmonary angioplasty (2019)   with Dr. Veronica - balloon angio to distal left main, pulmonary artery and segmental branches to the lingula and left lower lobe.     Assessment/Plan     1) Pulmonary   a. CTED limited mostly to right lung - near normal RV - unchanged exercise ability FC II. Known previous desat with significant exercise, prescribed oxygen, will not use. 2015 stress echo with maintained RVSP , TAPSE and RV size at peak exertion. Echo 10/23/2017 near normal to mild RV, likely unchanged. Cath last year 2017 with resting mPAP of 35 mm Hg. 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 Uptravi started, significant side effects because she inappropriately escalated dosing without reviewing or contacting physician office as instructed. we then slowly increase dose and only change after direct consultation with pulmonary vascular office. Also with pulmonary  vein stenosis as part of the same process.      1/10/2022 - taking meds, no edema tolerating 400/400 ug selexipag.      9/12/2022 - stopped taking PH meds because she is in [place]. Still taking anticoagulation. Echo unchanged with muscular RV but normal size, unchanged compared to 2021.     4/3/2023 - Last seen September 2022 with normal echo but had interrupted meds due to insurance issues. She has not worked to clear this up but did not tell us. Today has insurance approval but has not restarted. Will restart. triple therapy sequentially.      9/25/2023 - (+) 9 m , unchanged drop in SpO2 to 89%, using meds, resistant to uptitration of selexipag due to work.     1/15/2024 - Specialty pharmacy claims no delivery since July 2023 for Uptravi. Patient claims she has significant previously delivered drug which she is using, this is possible given dose, etc. Patient unable to uptitrate past 600 BID due to diarrhea, headache. May have to decrease to 600/400 unless sx decrease over next 1-2 weeks. Walk similar but desaturation to 86%, recommend use of ambulatory oxygen.      b. Desaturation due to CTED and dead space- still SpO2 = 91% on room air.      c. 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 This Limits possibilities for PTE. Angio 4/2019 suggested improvement of this flow and patient underwent BPA with significant subjective improvement which has now waned. Unclear if this is due to restenosis or ?     d.  "Previously INcreasing wheezing and allergy symptoms, patient has had albuterol inhaler for long time but very, very rarely. Now using regular symbicort, no nocturnal symptoms.      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      3) Obesity with increased weight, BMI = 35.5-> 36.9->37-.37.6     4) COVID infection, minor dyspnea, no oxygen , stayed home, no CXR feels \"normal\" now.    Plan    1) No change in meds.  2) Continue anticoagulation.   3) Weight loss.   4) No uptitration of selexipag at this time given symptoms. currently at 400/600. Side effects only diarrhea which is well controlled on imodium. No  change in selexipag for 1 month, patient to call then and we will reassess dosing.        "

## 2024-12-28 ENCOUNTER — OFFICE VISIT (OUTPATIENT)
Dept: URGENT CARE | Facility: CLINIC | Age: 39
End: 2024-12-28
Payer: COMMERCIAL

## 2024-12-28 VITALS
DIASTOLIC BLOOD PRESSURE: 89 MMHG | SYSTOLIC BLOOD PRESSURE: 114 MMHG | BODY MASS INDEX: 35.44 KG/M2 | WEIGHT: 200 LBS | OXYGEN SATURATION: 96 % | HEART RATE: 112 BPM | HEIGHT: 63 IN | RESPIRATION RATE: 16 BRPM | TEMPERATURE: 97.7 F

## 2024-12-28 DIAGNOSIS — H10.9 BACTERIAL CONJUNCTIVITIS: Primary | ICD-10-CM

## 2024-12-28 PROCEDURE — 99213 OFFICE O/P EST LOW 20 MIN: CPT | Performed by: NURSE PRACTITIONER

## 2024-12-28 RX ORDER — POLYMYXIN B SULFATE AND TRIMETHOPRIM 1; 10000 MG/ML; [USP'U]/ML
1 SOLUTION OPHTHALMIC EVERY 4 HOURS
Qty: 10 ML | Refills: 0 | Status: SHIPPED | OUTPATIENT
Start: 2024-12-28 | End: 2025-01-04

## 2024-12-28 ASSESSMENT — VISUAL ACUITY: OU: 1

## 2024-12-28 NOTE — PROGRESS NOTES
[facility]   [place] NOTE:      Name: Alda Chaney, 39 y.o.    CSN:[id number]   MRN:[id number]    PCP: Milagros Norman, DO    ALL:    Allergies   Allergen Reactions    Bee Venom Protein (Honey Bee) Anaphylaxis    Ciprofloxacin Unknown    Levofloxacin Other     contraindicated with myesthenia gravis    unk    Magnesium Anaphylaxis    Magnesium Chloride Unknown    Magnesium Sulfate Unknown     patient notified that elevated Mg level could result in respiratory distress.  not immunologically allergic to magnesium.    Hibiclens [Chlorhexidine Gluconate] Rash       History:    Chief Complaint: Conjunctivitis (Pt states bilateral pink eye for 2 days.)    Encounter Date: 12/28/2024      HPI: The history was obtained from the patient. Alda is a 39 y.o. female, who presents with a chief complaint of Conjunctivitis (Pt states bilateral pink eye for 2 days.)     Patient presents with bilateral eye redness, itchiness, and yellow drainage that began 2 days ago and has progressively worsened.  Denies pain to bilateral eyes.  No known exposure.  Is having mild sinus rhinitis with clear drainage over the last week.  Nuys fever, chills, visual changes.    PMHx:    Past Medical History:   Diagnosis Date    Asthma     CTEPH (chronic thromboembolic pulmonary hypertension)     History of DVT (deep vein thrombosis) 02/15/2012    Hyperopia of both eyes 12/30/2021    Myasthenia gravis, acetylcholine receptor antibody positive (Multi)     Other pulmonary embolism without acute cor pulmonale 04/07/2020    Pulmonary embolism    Personal history of DVT (deep vein thrombosis)     Posterior vitreous detachment of both eyes 09/17/2019    Pulmonary embolism and infarction (Multi) 02/15/2012    Vitreous floater, bilateral 09/17/2019              Current Outpatient Medications   Medication Sig Dispense Refill    acetaminophen (TylenoL) 325 mg tablet Take by mouth. TAKE 1 TO 2 TABLETS EVERY 4 HOURS AS NEEDED       albuterol 90 mcg/actuation inhaler TAKE 1 PUFF BY MOUTH EVERY 4 HOURS AS NEEDED 8.5 g 11    budesonide-formoteroL (Symbicort) 160-4.5 mcg/actuation inhaler Inhale 2 puffs 2 times a day. Rinse mouth with water after use to reduce aftertaste and incidence of candidiasis. Do not swallow. 10.2 g 2    calcium carbonate-vitamin D3 600 mg-10 mcg (400 unit) tablet Take 2 tablets by mouth once daily. Takes infrequently      fexofenadine (Allegra) 180 mg tablet Take 1 tablet (180 mg) by mouth once daily. 90 tablet 1    fluticasone (Flonase) 50 mcg/actuation nasal spray Administer 1 spray into each nostril 1 time if needed for allergies. 16 g 11    loperamide (Imodium A-D) 2 mg tablet Take 1 tablet (2 mg) by mouth 4 times a day as needed for diarrhea. 30 tablet 11    macitentan (Opsumit) 10 mg tablet tablet Take 1 tablet (10 mg) by mouth once daily.      montelukast (Singulair) 10 mg tablet Take 1 tablet (10 mg) by mouth once daily at bedtime. 90 tablet 3    NON FORMULARY Take 2 each by mouth once daily. Vein supplement      polymyxin B sulf-trimethoprim (Polytrim) ophthalmic solution Administer 1 drop into both eyes every 4 hours for 7 days. 10 mL 0    predniSONE (Deltasone) 20 mg tablet Take 3 tabs (60mg) daily for 3 days, then take 2 tabs (40mg) daily for 3 days, then take 1 tab (20mg) daily for 3 days. 18 tablet 0    pyridostigmine (Mestinon) 60 mg tablet Take 2 tablets (120 mg) by mouth once daily.      rivaroxaban (Xarelto) 20 mg tablet Take 1 tablet (20 mg) by mouth once daily. 90 tablet 3    selexipag (Uptravi) 200 mcg tablet Take 2 tablets (400 mcg) by mouth once daily. 400 mg in AM and 600 mg in PM      tadalafil, antihypertensive, 20 mg tablet TAKE 2 TABLETS DAILY 60 tablet 11    triamcinolone (Kenalog) 0.1 % cream Apply topically 2 times a day. Apply to affected area 1-2 times daily as needed. Avoid face and groin. (Patient not taking: Reported on 11/7/2024) 30 g 0     No current facility-administered medications  for this visit.         PMSx:    Past Surgical History:   Procedure Laterality Date    CARDIAC CATHETERIZATION N/A 03/28/2024    Procedure: Right Heart Cath;  Surgeon: Santiago Dimas MD;  Location: [place] [id number] Cardiac Cath Lab;  Service: Cardiovascular;  Laterality: N/A;    DILATION AND CURETTAGE OF UTERUS  06/21/2024    ENDOMETRIAL ABLATION  06/21/2024    EYE SURGERY  08/28/2013    Eye Surgery    IR CVC TUNNELED  09/27/2016    IR CVC TUNNELED 9/27/2016 [facility] [place]    MOUTH SURGERY  08/28/2013    Oral Surgery Tooth Extraction    MR CHEST ANGIO W IV CONTRAST  01/23/2018    MR CHEST ANGIO W IV CONTRAST 1/23/2018 [facility] [place] [facility]    MR CHEST ANGIO W IV CONTRAST  09/28/2012    MR CHEST ANGIO W IV CONTRAST 9/28/2012 [facility] [place] [facility]    MR HEAD ANGIO W AND WO IV CONTRAST  04/16/2014    MR HEAD ANGIO W AND WO IV CONTRAST 4/16/2014 [facility] ANCILLARY LEGACY    OTHER SURGICAL HISTORY  12/16/2022    Cardiac catheterization    OTHER SURGICAL HISTORY  02/09/2017    Sternotomy    THYMECTOMY  08/31/2017    Thymectomy       Fam Hx:   Family History   Problem Relation Name Age of Onset    Other (diabetes mellitus) Mother      Hypertension Mother      Cervical cancer Mother      Heart attack Father      Other (diabetes mellitus) Father      Heart disease Father      Other (Adopted) Father         SOC. Hx:     Social History     Socioeconomic History    Marital status:      Spouse name: Not on file    Number of children: Not on file    Years of education: Not on file    Highest education level: Not on file   Occupational History    Not on file   Tobacco Use    Smoking status: Never     Passive exposure: Never    Smokeless tobacco: Never   Vaping Use    Vaping status: Never Used   Substance and Sexual Activity    Alcohol use: Not Currently     Comment: Rarely    Drug use: Never    Sexual activity: Defer   Other Topics Concern    Not on file   Social History Narrative    Not on file     Social Drivers of  Health     Financial Resource Strain: Not on file   Food Insecurity: Not on file   Transportation Needs: Not on file   Physical Activity: Not on file   Stress: Not on file   Social Connections: Not on file   Intimate Partner Violence: Not on file   Housing Stability: Not on file         Vitals:    12/28/24 1203   BP: 114/89   Pulse: (!) 112   Resp: 16   Temp: 36.5 °C (97.7 °F)   SpO2: 96%     90.7 kg (200 lb)          Physical Exam  Vitals and nursing note reviewed.   Constitutional:       Appearance: Normal appearance.   HENT:      Head: Normocephalic and atraumatic.      Right Ear: Hearing, tympanic membrane, ear canal and external ear normal.      Left Ear: Hearing, tympanic membrane, ear canal and external ear normal.      Nose: Rhinorrhea present. Rhinorrhea is clear.      Mouth/Throat:      Mouth: Mucous membranes are moist.      Pharynx: Oropharynx is clear.   Eyes:      General: Lids are normal. Vision grossly intact.         Right eye: Discharge present.         Left eye: Discharge present.     Conjunctiva/sclera:      Right eye: Exudate present.      Left eye: Exudate present.      Pupils: Pupils are equal, round, and reactive to light.   Cardiovascular:      Rate and Rhythm: Normal rate and regular rhythm.      Pulses: Normal pulses.      Heart sounds: Normal heart sounds.   Pulmonary:      Effort: Pulmonary effort is normal.      Breath sounds: Normal breath sounds.   Abdominal:      General: Abdomen is flat. Bowel sounds are normal.      Palpations: Abdomen is soft.   Musculoskeletal:         General: Normal range of motion.      Cervical back: Normal range of motion.   Skin:     General: Skin is warm and dry.      Capillary Refill: Capillary refill takes less than 2 seconds.   Neurological:      Mental Status: She is alert and oriented to person, place, and time.             ____________________________________________________________________    I did personally review Alda's past medical history,  surgical history, social history, as well as family history (when relevant).  In this case, I also oversaw the her drug management by reviewing her medication list, allergy list, as well as the medications that I prescribed during the UC course and/or recommended as an out-patient (including possible OTC medications such as acetaminophen, NSAIDs , etc).    After reviewing the items above, I did look at previous medical documentation, such as recent hospitalizations, office visits, and/or recent consultations with PCP/specialist.                          SDOH:   Another factor that I considered in Alda's care was her Social Determinants of Health (SDOH). During this UC encounter, she did not have social determinants of health. Those SDOH influencing Alda's care are: none      _____________________________________________________________________      UC COURSE/MEDICAL DECISION MAKING:    Alda is a 39 y.o., who presents with a working diagnosis of   1. Bacterial conjunctivitis        Alda was seen today for conjunctivitis.  Diagnoses and all orders for this visit:  Bacterial conjunctivitis (Primary)  -     polymyxin B sulf-trimethoprim (Polytrim) ophthalmic solution; Administer 1 drop into both eyes every 4 hours for 7 days.         Plan of care reviewed with patient.  If symptoms change and/or worsen will follow-up with primary care provider, return to urgent care, or go to the nearest emergency department for further evaluation.  Patient states understanding and is agreeable with plan of care.      TUAN Hope, DNP   Advanced Practice Provider  [facility]    Please note: While the patient may or may not have received printed discharge paperwork, all relevant medical findings, test results, and treatment details are accessible through the electronic medical record system. The patient is encouraged to review their chart via the patient portal for comprehensive information  and follow-up instructions.

## 2025-01-06 DIAGNOSIS — I27.24 CTEPH (CHRONIC THROMBOEMBOLIC PULMONARY HYPERTENSION): Primary | ICD-10-CM

## 2025-01-08 ENCOUNTER — APPOINTMENT (OUTPATIENT)
Dept: RESPIRATORY THERAPY | Facility: HOSPITAL | Age: 40
End: 2025-01-08
Payer: COMMERCIAL

## 2025-01-08 ENCOUNTER — APPOINTMENT (OUTPATIENT)
Dept: PULMONOLOGY | Facility: HOSPITAL | Age: 40
End: 2025-01-08
Payer: COMMERCIAL

## 2025-01-13 DIAGNOSIS — J45.909 MILD ASTHMA WITHOUT COMPLICATION, UNSPECIFIED WHETHER PERSISTENT (HHS-HCC): ICD-10-CM

## 2025-01-14 RX ORDER — ALBUTEROL SULFATE 90 UG/1
1 INHALANT RESPIRATORY (INHALATION) EVERY 4 HOURS PRN
Qty: 8 G | Refills: 11 | Status: SHIPPED | OUTPATIENT
Start: 2025-01-14

## 2025-02-02 ENCOUNTER — HOSPITAL ENCOUNTER (EMERGENCY)
Facility: HOSPITAL | Age: 40
Discharge: HOME | End: 2025-02-02
Attending: EMERGENCY MEDICINE
Payer: COMMERCIAL

## 2025-02-02 ENCOUNTER — APPOINTMENT (OUTPATIENT)
Dept: RADIOLOGY | Facility: HOSPITAL | Age: 40
End: 2025-02-02
Payer: COMMERCIAL

## 2025-02-02 VITALS
SYSTOLIC BLOOD PRESSURE: 105 MMHG | OXYGEN SATURATION: 95 % | WEIGHT: 210 LBS | HEART RATE: 73 BPM | TEMPERATURE: 98.1 F | RESPIRATION RATE: 16 BRPM | HEIGHT: 63 IN | DIASTOLIC BLOOD PRESSURE: 75 MMHG | BODY MASS INDEX: 37.21 KG/M2

## 2025-02-02 DIAGNOSIS — R11.2 NAUSEA VOMITING AND DIARRHEA: Primary | ICD-10-CM

## 2025-02-02 DIAGNOSIS — R19.7 NAUSEA VOMITING AND DIARRHEA: Primary | ICD-10-CM

## 2025-02-02 DIAGNOSIS — J20.9 ACUTE BRONCHITIS, UNSPECIFIED ORGANISM: ICD-10-CM

## 2025-02-02 LAB
FLUAV RNA RESP QL NAA+PROBE: NOT DETECTED
FLUBV RNA RESP QL NAA+PROBE: NOT DETECTED
SARS-COV-2 RNA RESP QL NAA+PROBE: NOT DETECTED

## 2025-02-02 PROCEDURE — 2500000002 HC RX 250 W HCPCS SELF ADMINISTERED DRUGS (ALT 637 FOR MEDICARE OP, ALT 636 FOR OP/ED)

## 2025-02-02 PROCEDURE — 2500000002 HC RX 250 W HCPCS SELF ADMINISTERED DRUGS (ALT 637 FOR MEDICARE OP, ALT 636 FOR OP/ED): Performed by: PHYSICIAN ASSISTANT

## 2025-02-02 PROCEDURE — 87636 SARSCOV2 & INF A&B AMP PRB: CPT | Performed by: PHYSICIAN ASSISTANT

## 2025-02-02 PROCEDURE — 94640 AIRWAY INHALATION TREATMENT: CPT

## 2025-02-02 PROCEDURE — 71046 X-RAY EXAM CHEST 2 VIEWS: CPT | Performed by: RADIOLOGY

## 2025-02-02 PROCEDURE — 99284 EMERGENCY DEPT VISIT MOD MDM: CPT | Mod: 25 | Performed by: EMERGENCY MEDICINE

## 2025-02-02 PROCEDURE — 71046 X-RAY EXAM CHEST 2 VIEWS: CPT

## 2025-02-02 PROCEDURE — 9420000001 HC RT PATIENT EDUCATION 5 MIN

## 2025-02-02 PROCEDURE — 94664 DEMO&/EVAL PT USE INHALER: CPT

## 2025-02-02 RX ORDER — PREDNISONE 50 MG/1
50 TABLET ORAL DAILY
Qty: 5 TABLET | Refills: 0 | Status: SHIPPED | OUTPATIENT
Start: 2025-02-02 | End: 2025-02-07

## 2025-02-02 RX ORDER — DOXYCYCLINE 100 MG/1
100 CAPSULE ORAL 2 TIMES DAILY
Qty: 20 CAPSULE | Refills: 0 | Status: SHIPPED | OUTPATIENT
Start: 2025-02-02 | End: 2025-02-12

## 2025-02-02 RX ORDER — IPRATROPIUM BROMIDE AND ALBUTEROL SULFATE 2.5; .5 MG/3ML; MG/3ML
3 SOLUTION RESPIRATORY (INHALATION) ONCE
Status: COMPLETED | OUTPATIENT
Start: 2025-02-02 | End: 2025-02-02

## 2025-02-02 RX ORDER — IPRATROPIUM BROMIDE AND ALBUTEROL SULFATE 2.5; .5 MG/3ML; MG/3ML
SOLUTION RESPIRATORY (INHALATION)
Status: COMPLETED
Start: 2025-02-02 | End: 2025-02-02

## 2025-02-02 RX ORDER — IPRATROPIUM BROMIDE AND ALBUTEROL SULFATE 2.5; .5 MG/3ML; MG/3ML
3 SOLUTION RESPIRATORY (INHALATION)
Qty: 100 ML | Refills: 0 | Status: SHIPPED | OUTPATIENT
Start: 2025-02-02

## 2025-02-02 RX ORDER — ONDANSETRON 4 MG/1
4 TABLET, ORALLY DISINTEGRATING ORAL EVERY 8 HOURS PRN
Qty: 9 TABLET | Refills: 0 | Status: SHIPPED | OUTPATIENT
Start: 2025-02-02 | End: 2025-02-05

## 2025-02-02 RX ADMIN — IPRATROPIUM BROMIDE AND ALBUTEROL SULFATE 3 ML: .5; 3 SOLUTION RESPIRATORY (INHALATION) at 15:49

## 2025-02-02 RX ADMIN — IPRATROPIUM BROMIDE AND ALBUTEROL SULFATE 3 ML: .5; 3 SOLUTION RESPIRATORY (INHALATION) at 15:40

## 2025-02-02 RX ADMIN — IPRATROPIUM BROMIDE AND ALBUTEROL SULFATE 3 ML: 2.5; .5 SOLUTION RESPIRATORY (INHALATION) at 15:40

## 2025-02-02 ASSESSMENT — ENCOUNTER SYMPTOMS
PALPITATIONS: 0
ABDOMINAL PAIN: 0
CHILLS: 1
DYSURIA: 0
SHORTNESS OF BREATH: 0
COUGH: 0
FEVER: 1
HEMATURIA: 0
SORE THROAT: 0
BACK PAIN: 0
VOMITING: 0
COLOR CHANGE: 0
EYE PAIN: 0
ARTHRALGIAS: 0
SEIZURES: 0

## 2025-02-02 ASSESSMENT — COLUMBIA-SUICIDE SEVERITY RATING SCALE - C-SSRS
6. HAVE YOU EVER DONE ANYTHING, STARTED TO DO ANYTHING, OR PREPARED TO DO ANYTHING TO END YOUR LIFE?: NO
2. HAVE YOU ACTUALLY HAD ANY THOUGHTS OF KILLING YOURSELF?: NO
1. IN THE PAST MONTH, HAVE YOU WISHED YOU WERE DEAD OR WISHED YOU COULD GO TO SLEEP AND NOT WAKE UP?: NO

## 2025-02-02 ASSESSMENT — PAIN DESCRIPTION - LOCATION: LOCATION: CHEST

## 2025-02-02 ASSESSMENT — PAIN SCALES - GENERAL: PAINLEVEL_OUTOF10: 8

## 2025-02-02 ASSESSMENT — PAIN - FUNCTIONAL ASSESSMENT: PAIN_FUNCTIONAL_ASSESSMENT: 0-10

## 2025-02-02 NOTE — ED PROVIDER NOTES
Patient is a 39-year-old female who presents to the emergency room for chief complaint of flulike symptoms.  She states that her symptoms started on Friday.  She reports fever, nausea, vomiting, body aches, and a cough.  Patient reports a history of hypertension.  She states that she has an inhaler but has noticed that she has been wheezing as well.  She reports a tightness in her chest.           Review of Systems   Constitutional:  Positive for chills and fever.   HENT:  Negative for ear pain and sore throat.    Eyes:  Negative for pain and visual disturbance.   Respiratory:  Negative for cough and shortness of breath.    Cardiovascular:  Negative for chest pain and palpitations.   Gastrointestinal:  Negative for abdominal pain and vomiting.   Genitourinary:  Negative for dysuria and hematuria.   Musculoskeletal:  Negative for arthralgias and back pain.   Skin:  Negative for color change and rash.   Neurological:  Negative for seizures and syncope.   All other systems reviewed and are negative.       Physical Exam  Vitals and nursing note reviewed.   Constitutional:       General: She is not in acute distress.     Appearance: She is well-developed.   HENT:      Head: Normocephalic and atraumatic.      Right Ear: Tympanic membrane, ear canal and external ear normal.      Left Ear: Tympanic membrane, ear canal and external ear normal.      Nose: No congestion or rhinorrhea.   Eyes:      Extraocular Movements: Extraocular movements intact.      Conjunctiva/sclera: Conjunctivae normal.      Pupils: Pupils are equal, round, and reactive to light.   Cardiovascular:      Rate and Rhythm: Normal rate and regular rhythm.      Heart sounds: No murmur heard.  Pulmonary:      Effort: Pulmonary effort is normal. No respiratory distress.      Breath sounds: No stridor. Wheezing present. No rhonchi or rales.   Chest:      Chest wall: No tenderness.   Abdominal:      General: There is no distension.      Palpations: Abdomen is  soft. There is no mass.      Tenderness: There is no abdominal tenderness. There is no guarding or rebound.      Hernia: No hernia is present.   Musculoskeletal:         General: No swelling.      Cervical back: Normal range of motion and neck supple. No rigidity.   Skin:     General: Skin is warm and dry.      Capillary Refill: Capillary refill takes less than 2 seconds.   Neurological:      General: No focal deficit present.      Mental Status: She is alert and oriented to person, place, and time.   Psychiatric:         Mood and Affect: Mood normal.          Labs Reviewed   SARS-COV-2 AND INFLUENZA A/B PCR - Normal       Result Value    Flu A Result Not Detected      Flu B Result Not Detected      Coronavirus 2019, PCR Not Detected      Narrative:     This assay is an FDA-cleared, in vitro diagnostic nucleic acid amplification test for the qualitative detection and differentiation of SARS CoV-2/ Influenza A/B from nasopharyngeal specimens collected from individuals with signs and symptoms of respiratory tract infections, and has been validated for use at [facility]. Negative results do not preclude COVID-19/ Influenza A/B infections and should not be used as the sole basis for diagnosis, treatment, or other management decisions. Testing for SARS CoV-2 is recommended only for patients who meet current clinical and/or epidemiological criteria defined by federal, state, or local public health directives.        XR chest 2 views   Final Result   1.  No evidence of acute cardiopulmonary process.             Signed by: Julian Hammond 2/2/2025 4:44 PM   Dictation workstation:   [id number]           Procedures     Medical Decision Making  Patient is an 39-year-old female who presents to the emergency room for chief complaint of flulike symptoms.  COVID, influenza are negative. Chest x-ray shows no evidence of acute cardiopulmonary process.  Patient did have diffuse wheezes noted on exam.  She also  has myasthenia gravis.  I feel that she will benefit from prednisone.  Discussed prednisone with patient's myasthenia gravis medication and patient states that her specialist has her on prednisone all the time.  In addition given physical examination concern for atypical pneumonia.  Patient placed on doxycycline.  In addition nebulizer machine provided for the patient.  Patient discharged with prescriptions for doxycycline, prednisone, DuoNeb inhalation solution.  She was instructed to return for any new or worsening symptoms and follow-up with her primary care physician.    Lazarus diagnosis includes but not limited to COVID, influenza, bronchitis, strep, pneumonia, sepsis    Amount and/or Complexity of Data Reviewed  Labs: ordered. Decision-making details documented in ED Course.  Radiology: ordered and independent interpretation performed. Decision-making details documented in ED Course.     Details: 2 view chest x-ray per my interpretation shows no infiltrate or effusion    Risk  Prescription drug management.        Diagnoses as of 02/02/25 2213   Nausea vomiting and diarrhea   Acute bronchitis, unspecified organism                    Maryann Gale PA-C  02/02/25 2215

## 2025-02-02 NOTE — NURSING NOTE
Patient given a nebulizer, papers signed and explained to patient and spouse how it works.     Karen Brown, RRT  5:55 PM

## 2025-02-02 NOTE — Clinical Note
Alda Chaney was seen and treated in our emergency department on 2/2/2025.  She may return to work on 02/05/2025.       If you have any questions or concerns, please don't hesitate to call.      Maryann Gale PA-C

## 2025-02-03 ENCOUNTER — DOCUMENTATION (OUTPATIENT)
Dept: PULMONOLOGY | Facility: HOSPITAL | Age: 40
End: 2025-02-03
Payer: COMMERCIAL

## 2025-02-04 ASSESSMENT — ENCOUNTER SYMPTOMS
FATIGUE: 0
LIGHT-HEADEDNESS: 0
NAUSEA: 0
PSYCHIATRIC NEGATIVE: 1
COUGH: 1
DIARRHEA: 0
HEADACHES: 0
ALLERGIC/IMMUNOLOGIC NEGATIVE: 1
SHORTNESS OF BREATH: 1
MUSCULOSKELETAL NEGATIVE: 1
PALPITATIONS: 0
ENDOCRINE NEGATIVE: 1
DIZZINESS: 0
EYES NEGATIVE: 1
CONSTITUTIONAL NEGATIVE: 1
HEMATOLOGIC/LYMPHATIC NEGATIVE: 1
CHEST TIGHTNESS: 0

## 2025-02-04 NOTE — PROGRESS NOTES
History Of Present Illness  Alda Chaney is a 39 y.o. female presenting being seen today for CTEPH follow up.  Patient is NYHA Functional Class 1-2 and WHO Group 4 Her last visit was 7/10/2024. She has been with our clinic since 10/2012. Seeing vascular doctor in [place].     PAH Treatment:  Opsumit 10 mg daily (11/30/2019) restarted (8/12/2024)  Uptravi, 400/600 mcg (3/17/2021)  Tadalafil 40 mg daily (10/2013)  Xarelto 20 mg daily  Oxygen 2 LPM HS  Infusion site: N/A  Treatment history:   Revatio (2013) non compliance    Today's testing includes 6 MWT and labs     Interval History   ED Visits on 10/26/24 for shortness of breath due to viral illness and 2/2/25 for flu like symptoms.     Past Medical History  Patient Active Problem List   Diagnosis    Asthma    Complicated migraine    Cough with sputum    CTEPH (chronic thromboembolic pulmonary hypertension)    DVT (deep venous thrombosis) (Multi)    Exertional shortness of breath    Limb pain    Myasthenia gravis, acetylcholine receptor antibody positive (Multi)    Obesity    Pulmonary embolism    Pulmonary vein injury    Seasonal allergic reaction    Secondary hypercoagulable state (Multi)    Shoulder pain    URI (upper respiratory infection)    Anal fissure    BRBPR (bright red blood per rectum)    Cardiomegaly    Anxiety disorder    Swelling of right lower extremity    Residual hemorrhoidal skin tags    Rectal pain    Postthrombotic syndrome of right lower extremity    Pain of right lower extremity    Localized edema    Diverticulosis of large intestine    Furuncle    Dependence on supplemental oxygen    Hypertropia of left eye    Hypertropia of right eye    Post-thrombotic syndrome of right lower extremity        Surgical History  She has a past surgical history that includes Eye surgery (08/28/2013); Mouth surgery (08/28/2013); Other surgical history (12/16/2022); Thymectomy (08/31/2017); Other surgical history (02/09/2017); MR angio head w and wo IV contrast  (04/16/2014); MR angio chest w IV contrast (01/23/2018); IR CVC tunneled (09/27/2016); MR angio chest w IV contrast (09/28/2012); Cardiac catheterization (N/A, 03/28/2024); Dilation and curettage of uterus (06/21/2024); and Endometrial ablation (06/21/2024).     Social History  She reports that she has never smoked. She has never been exposed to tobacco smoke. She has never used smokeless tobacco. She reports that she does not currently use alcohol. She reports that she does not use drugs.    Family History  Family History   Problem Relation Name Age of Onset    Other (diabetes mellitus) Mother      Hypertension Mother      Cervical cancer Mother      Heart attack Father      Other (diabetes mellitus) Father      Heart disease Father      Other (Adopted) Father          Medications  Current Outpatient Medications:     acetaminophen (TylenoL) 325 mg tablet, Take by mouth. TAKE 1 TO 2 TABLETS EVERY 4 HOURS AS NEEDED, Disp: , Rfl:     albuterol 90 mcg/actuation inhaler, INHALE 1 PUFF BY MOUTH EVERY 4 HOURS AS NEEDED, Disp: 8 g, Rfl: 11    budesonide-formoteroL (Symbicort) 160-4.5 mcg/actuation inhaler, Inhale 2 puffs 2 times a day. Rinse mouth with water after use to reduce aftertaste and incidence of candidiasis. Do not swallow., Disp: 10.2 g, Rfl: 2    calcium carbonate-vitamin D3 600 mg-10 mcg (400 unit) tablet, Take 2 tablets by mouth once daily. Takes infrequently, Disp: , Rfl:     doxycycline (Vibramycin) 100 mg capsule, Take 1 capsule (100 mg) by mouth 2 times a day for 10 days. Take with at least 8 ounces (large glass) of water, do not lie down for 30 minutes after, Disp: 20 capsule, Rfl: 0    fexofenadine (Allegra) 180 mg tablet, Take 1 tablet (180 mg) by mouth once daily., Disp: 90 tablet, Rfl: 1    fluticasone (Flonase) 50 mcg/actuation nasal spray, Administer 1 spray into each nostril 1 time if needed for allergies., Disp: 16 g, Rfl: 11    ipratropium-albuteroL (Duo-Neb) 0.5-2.5 mg/3 mL nebulizer solution,  Take 3 mL by nebulization every 6 hours., Disp: 100 mL, Rfl: 0    macitentan (Opsumit) 10 mg tablet tablet, Take 1 tablet (10 mg) by mouth once daily., Disp: , Rfl:     montelukast (Singulair) 10 mg tablet, Take 1 tablet (10 mg) by mouth once daily at bedtime., Disp: 90 tablet, Rfl: 3    NON FORMULARY, Take 2 each by mouth once daily. Vein supplement, Disp: , Rfl:     ondansetron ODT (Zofran-ODT) 4 mg disintegrating tablet, Dissolve 1 tablet (4 mg) in the mouth every 8 hours if needed for nausea or vomiting for up to 3 days., Disp: 9 tablet, Rfl: 0    predniSONE (Deltasone) 50 mg tablet, Take 1 tablet (50 mg) by mouth once daily for 5 days., Disp: 5 tablet, Rfl: 0    pyridostigmine (Mestinon) 60 mg tablet, Take 2 tablets (120 mg) by mouth once daily., Disp: , Rfl:     rivaroxaban (Xarelto) 20 mg tablet, Take 1 tablet (20 mg) by mouth once daily., Disp: 90 tablet, Rfl: 3    selexipag (Uptravi) 200 mcg tablet, Take 2 tablets (400 mcg) by mouth once daily. 400 mg in AM and 600 mg in PM, Disp: , Rfl:     tadalafil, antihypertensive, 20 mg tablet, TAKE 2 TABLETS DAILY, Disp: 60 tablet, Rfl: 11    triamcinolone (Kenalog) 0.1 % cream, Apply topically 2 times a day. Apply to affected area 1-2 times daily as needed. Avoid face and groin. (Patient not taking: Reported on 11/7/2024), Disp: 30 g, Rfl: 0     Allergies  Bee venom protein (honey bee), Ciprofloxacin, Levofloxacin, Magnesium, Magnesium chloride, Magnesium sulfate, and Hibiclens [chlorhexidine gluconate]    Review of Systems   Constitutional: Negative.  Negative for fatigue.   Eyes: Negative.    Respiratory:  Positive for cough and shortness of breath. Negative for chest tightness.    Cardiovascular:  Negative for chest pain, palpitations and leg swelling.   Gastrointestinal:  Negative for diarrhea and nausea.   Endocrine: Negative.    Genitourinary: Negative.    Musculoskeletal: Negative.    Skin: Negative.    Allergic/Immunologic: Negative.    Neurological:   Negative for dizziness, syncope, light-headedness and headaches.   Hematological: Negative.    Psychiatric/Behavioral: Negative.         Last Recorded Vitals    There were no vitals filed for this visit.         Physical Exam  Constitutional:       Appearance: Normal appearance. She is obese.   HENT:      Head: Normocephalic.      Nose: Nose normal.      Mouth/Throat:      Mouth: Mucous membranes are moist.   Cardiovascular:      Rate and Rhythm: Normal rate and regular rhythm.   Pulmonary:      Effort: Pulmonary effort is normal.      Breath sounds: Normal breath sounds.   Abdominal:      General: Bowel sounds are normal.      Palpations: Abdomen is soft.   Musculoskeletal:      Cervical back: Normal range of motion.      Right lower leg: No edema.      Left lower leg: No edema.   Skin:     Findings: No rash.   Psychiatric:         Mood and Affect: Mood normal.         Judgment: Judgment normal.       Relevant Results    6MWT (2/12/2025)  SP02-  HR-  DANIS-  Actual Meters-     Echo (7/10/2024)  Left Ventricle: Left ventricular ejection fraction is normal, by visual estimate at 55-60%. There are no regional wall motion abnormalities. The left ventricular cavity size is normal. Spectral Doppler shows a normal pattern of left ventricular diastolic filling.  Left Atrium: The left atrium is normal in size.  Right Ventricle: The right ventricle is upper limits of normal in size. There is low normal right ventricular systolic function.  Right Atrium: The right atrium is mildly dilated.  Aortic Valve: The aortic valve is trileaflet. There is no evidence of aortic valve regurgitation. The peak instantaneous gradient of the aortic valve is 4.4 mmHg.  Mitral Valve: The mitral valve is normal in structure. There is no evidence of mitral valve regurgitation.  Tricuspid Valve: The tricuspid valve is structurally normal. There is trace tricuspid regurgitation. The right ventricular systolic pressure is unable to be  estimated.  Pulmonic Valve: The pulmonic valve is structurally normal. There is physiologic pulmonic valve regurgitation.  Pericardium: There is no pericardial effusion noted.  Aorta: The aortic root is normal.  Systemic Veins: The inferior vena cava size appears small. There is IVC inspiratory collapse greater than 50%.  In comparison to the previous echocardiogram(s): Compared with study dated 2022,.     CONCLUSIONS:   1. Poorly visualized anatomical structures due to suboptimal image quality.   2. Left ventricular ejection fraction is normal, by visual estimate at 55-60%.   3. There is low normal right ventricular systolic function.    6MWT (7/10/2024)  SP02- 98- 93% RA  HR-   REUBEN- 0-3  Actual Meters 408     RHC (3/28/2024)  PAP- 40/18 (27)  PCWP- 13  CO/CI- 2.7/5.2     6MWT (1/15/2024)  HR 64 - 133  O2 94 - 86 on RA  Reuben 0 - 4  Meters 465     6MWT (2023)  SpO2: 96% - 89% on RA  HR: 74 - 139  Reuben: 0 - 3  Actual 466m     6MWT (2023)  SpO2: 97-88% RA  HR:   Reuben: 0-3  Actual meters: 457    Echo (2023)   Left Ventricle: Left ventricular systolic function is normal, with an estimated ejection fraction of 60%. There are no regional wall motion abnormalities. The left ventricular cavity size is normal. Spectral Doppler shows a pseudonormal pattern of left ventricular diastolic filling.  Left Atrium: The left atrium is normal in size. A bubble study using agitated saline was performed. Bubble study is negative.  Right Ventricle: The right ventricle is upper limits of normal in size. There is reduced right ventricular systolic function.  Right Atrium: The right atrium is normal in size.  Aortic Valve: The aortic valve is trileaflet. There is no evidence of aortic valve regurgitation. The peak instantaneous gradient of the aortic valve is 8.0 mmHg. The mean gradient of the aortic valve is 4.0 mmHg.  Mitral Valve: The mitral valve is normal in structure. There is no evidence of mitral  valve regurgitation.  Tricuspid Valve: The tricuspid valve is structurally normal. No evidence of tricuspid regurgitation.  Pulmonic Valve: The pulmonic valve is not well visualized. There is no indication of pulmonic valve regurgitation.  Pericardium: There is no pericardial effusion noted.  Aorta: The aortic root is normal.  Systemic Veins: The inferior vena cava appears to be of normal size. There is IVC inspiratory collapse greater than 50%.  CONCLUSIONS:  1. Left ventricular systolic function is normal with a 60% estimated ejection fraction.  2. Spectral Doppler shows a pseudonormal pattern of left ventricular diastolic filling.  3. There is reduced right ventricular systolic function.     6MWT (4/3/2023)  SpO2: 97% - 91% on RA  HR: 72 - 135  Reuben: 0 - 3  Actual 450m     Echo (3/12/2022)   Normal size RV with mildly reduced function, normal size RA     Pulmonary angiogram (2019)   with Dr. Veronica - attempted left pulmonary artery angioplasty.  Pulmonary angioplasty (2019)   with Dr. Veronica - balloon angio to distal left main, pulmonary artery and segmental branches to the lingula and left lower lobe.    Assessment/Plan     1) Pulmonary   a. CTED limited mostly to right lung - near normal RV - unchanged exercise ability FC II. Known previous desat with significant exercise, prescribed oxygen, will not use. 2015 stress echo with maintained RVSP , TAPSE and RV size at peak exertion. Echo 10/23/2017 near normal to mild RV, likely unchanged. Cath last year 2017 with resting mPAP of 35 mm Hg. 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 Uptravi started, significant side effects because she  inappropriately escalated dosing without reviewing or contacting physician office as instructed. we then slowly increase dose and only change after direct consultation with pulmonary vascular office. Also with pulmonary  vein stenosis as part of the same process.      1/10/2022 - taking meds, no edema tolerating 400/400 ug selexipag.      9/12/2022 - stopped taking PH meds because she is in [place]. Still taking anticoagulation. Echo unchanged with muscular RV but normal size, unchanged compared to 2021.     4/3/2023 - Last seen September 2022 with normal echo but had interrupted meds due to insurance issues. She has not worked to clear this up but did not tell us. Today has insurance approval but has not restarted. Will restart. triple therapy sequentially.      9/25/2023 - (+) 9 m , unchanged drop in SpO2 to 89%, using meds, resistant to uptitration of selexipag due to work.     1/15/2024 - Specialty pharmacy claims no delivery since July 2023 for Uptravi. Patient claims she has significant previously delivered drug which she is using, this is possible given dose, etc. Patient unable to uptitrate past 600 BID due to diarrhea, headache. May have to decrease to 600/400 unless sx decrease over next 1-2 weeks. Walk similar but desaturation to 86%, recommend use of ambulatory oxygen.      b. Desaturation due to CTED and dead space- still SpO2 = 91% on room air.      c. 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 This Limits possibilities for PTE. Angio 4/2019 suggested improvement of this flow and patient underwent BPA with significant subjective improvement which has now waned. Unclear if this is due to restenosis or ?     d. Previously INcreasing wheezing and allergy symptoms, patient has had albuterol inhaler for long time but very, very rarely. Now using regular symbicort, no nocturnal symptoms.      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      3) Obesity with increased weight, BMI = 35.5-> 36.9->37-.37.6     4) COVID infection, minor dyspnea, no oxygen , stayed home, no CXR feels \"normal\" now.    Plan    1) No change in meds.  2) Continue anticoagulation.   3) Weight loss.   4) No uptitration of selexipag at this time given symptoms. currently at 400/600. Side effects only diarrhea which is well controlled on imodium. No  change in selexipag for 1 month, patient to call then and we will reassess dosing.     "

## 2025-02-07 ENCOUNTER — DOCUMENTATION (OUTPATIENT)
Dept: PULMONOLOGY | Facility: HOSPITAL | Age: 40
End: 2025-02-07
Payer: COMMERCIAL

## 2025-02-07 NOTE — PROGRESS NOTES
RN called patient to confirm appointments. She stated she is in the process of rescheduling due to work.    Recent sickness for 3 days were she was unable to take any medications. Just restarted her Uptravi at the starting dose.     Medication: Uptravi (selexipag)    Symptoms: headache/diarrhea/flushing/jaw pain/ nausea/vomiting/neuropathy    Current dose: 200 mcg BID started on 2/5/2025      Plan: Will increase if tolerated next week.     Opsumit:  Stated she had an endometrial ablation on 6/21/2024 at [facility]. Patient inquired if this would allow her to stop the monthly pregnancy testing.

## 2025-02-11 DIAGNOSIS — R06.02 EXERTIONAL SHORTNESS OF BREATH: ICD-10-CM

## 2025-02-11 DIAGNOSIS — I27.24 CTEPH (CHRONIC THROMBOEMBOLIC PULMONARY HYPERTENSION): Primary | ICD-10-CM

## 2025-02-11 DIAGNOSIS — Z79.899 LONG-TERM USE OF HIGH-RISK MEDICATION: ICD-10-CM

## 2025-02-11 ASSESSMENT — ENCOUNTER SYMPTOMS
ENDOCRINE NEGATIVE: 1
PALPITATIONS: 0
CHEST TIGHTNESS: 0
FATIGUE: 0
ALLERGIC/IMMUNOLOGIC NEGATIVE: 1
NAUSEA: 0
EYES NEGATIVE: 1
CONSTITUTIONAL NEGATIVE: 1
HEADACHES: 0
SHORTNESS OF BREATH: 1
DIZZINESS: 0
COUGH: 1
HEMATOLOGIC/LYMPHATIC NEGATIVE: 1
MUSCULOSKELETAL NEGATIVE: 1
DIARRHEA: 0
PSYCHIATRIC NEGATIVE: 1
LIGHT-HEADEDNESS: 0

## 2025-02-11 NOTE — PROGRESS NOTES
History Of Present Illness  Alda Chaney is a 39 y.o. female presenting being seen today for CTEPH follow up.  Patient is NYHA Functional Class 1-2 and WHO Group 4 Her last visit was 1/15/2023. She has been with our clinic since 2012. No known admissions or new medical issues since her last visit. Seeing vascular doctor in [place].     PAH Treatment:   Opsumit (11/30/2019 ) restarted (8/12/2024)  Uptravi (400 /600 mcgs )  BID 3/17/00174  Tadalafil (10/2013)  Xarelto (11/7/2024)  Oxygen: 2 L at HS  Infusion site: N/A  Treatment history: Revatio:  (stop 2013), noncompliance    Interval History   ED Visits on 10/26/24 for shortness of breath due to viral illness and 2/2/25 for flu like symptoms.           Past Medical History  Patient Active Problem List   Diagnosis    Asthma    Complicated migraine    Cough with sputum    CTEPH (chronic thromboembolic pulmonary hypertension)    DVT (deep venous thrombosis) (Multi)    Exertional shortness of breath    Limb pain    Myasthenia gravis, acetylcholine receptor antibody positive (Multi)    Obesity    Pulmonary embolism    Pulmonary vein injury    Seasonal allergic reaction    Secondary hypercoagulable state (Multi)    Shoulder pain    URI (upper respiratory infection)    Anal fissure    BRBPR (bright red blood per rectum)    Cardiomegaly    Anxiety disorder    Swelling of right lower extremity    Residual hemorrhoidal skin tags    Rectal pain    Postthrombotic syndrome of right lower extremity    Pain of right lower extremity    Localized edema    Diverticulosis of large intestine    Furuncle    Dependence on supplemental oxygen    Hypertropia of left eye    Hypertropia of right eye    Post-thrombotic syndrome of right lower extremity        Surgical History  She has a past surgical history that includes Eye surgery (08/28/2013); Mouth surgery (08/28/2013); Other surgical history (12/16/2022); Thymectomy (08/31/2017); Other surgical history (02/09/2017); MR angio head w  and wo IV contrast (04/16/2014); MR angio chest w IV contrast (01/23/2018); IR CVC tunneled (09/27/2016); MR angio chest w IV contrast (09/28/2012); Cardiac catheterization (N/A, 03/28/2024); Dilation and curettage of uterus (06/21/2024); and Endometrial ablation (06/21/2024).     Social History  She reports that she has never smoked. She has never been exposed to tobacco smoke. She has never used smokeless tobacco. She reports that she does not currently use alcohol. She reports that she does not use drugs.    Family History  Family History   Problem Relation Name Age of Onset    Other (diabetes mellitus) Mother      Hypertension Mother      Cervical cancer Mother      Heart attack Father      Other (diabetes mellitus) Father      Heart disease Father      Other (Adopted) Father          Medications      Current Outpatient Medications:     acetaminophen (TylenoL) 325 mg tablet, Take by mouth. TAKE 1 TO 2 TABLETS EVERY 4 HOURS AS NEEDED, Disp: , Rfl:     albuterol 90 mcg/actuation inhaler, INHALE 1 PUFF BY MOUTH EVERY 4 HOURS AS NEEDED, Disp: 8 g, Rfl: 11    budesonide-formoteroL (Symbicort) 160-4.5 mcg/actuation inhaler, Inhale 2 puffs 2 times a day. Rinse mouth with water after use to reduce aftertaste and incidence of candidiasis. Do not swallow., Disp: 10.2 g, Rfl: 2    calcium carbonate-vitamin D3 600 mg-10 mcg (400 unit) tablet, Take 2 tablets by mouth once daily. Takes infrequently, Disp: , Rfl:     doxycycline (Vibramycin) 100 mg capsule, Take 1 capsule (100 mg) by mouth 2 times a day for 10 days. Take with at least 8 ounces (large glass) of water, do not lie down for 30 minutes after, Disp: 20 capsule, Rfl: 0    fexofenadine (Allegra) 180 mg tablet, Take 1 tablet (180 mg) by mouth once daily., Disp: 90 tablet, Rfl: 1    fluticasone (Flonase) 50 mcg/actuation nasal spray, Administer 1 spray into each nostril 1 time if needed for allergies., Disp: 16 g, Rfl: 11    ipratropium-albuteroL (Duo-Neb) 0.5-2.5 mg/3  mL nebulizer solution, Take 3 mL by nebulization every 6 hours., Disp: 100 mL, Rfl: 0    macitentan (Opsumit) 10 mg tablet tablet, Take 1 tablet (10 mg) by mouth once daily., Disp: , Rfl:     montelukast (Singulair) 10 mg tablet, Take 1 tablet (10 mg) by mouth once daily at bedtime., Disp: 90 tablet, Rfl: 3    NON FORMULARY, Take 2 each by mouth once daily. Vein supplement, Disp: , Rfl:     pyridostigmine (Mestinon) 60 mg tablet, Take 2 tablets (120 mg) by mouth once daily., Disp: , Rfl:     rivaroxaban (Xarelto) 20 mg tablet, Take 1 tablet (20 mg) by mouth once daily., Disp: 90 tablet, Rfl: 3    selexipag (Uptravi) 200 mcg tablet, Take 2 tablets (400 mcg) by mouth once daily. 400 mg in AM and 600 mg in PM, Disp: , Rfl:     tadalafil, antihypertensive, 20 mg tablet, TAKE 2 TABLETS DAILY, Disp: 60 tablet, Rfl: 11    triamcinolone (Kenalog) 0.1 % cream, Apply topically 2 times a day. Apply to affected area 1-2 times daily as needed. Avoid face and groin. (Patient not taking: Reported on 11/7/2024), Disp: 30 g, Rfl: 0     Allergies  Bee venom protein (honey bee), Ciprofloxacin, Levofloxacin, Magnesium, Magnesium chloride, Magnesium sulfate, and Hibiclens [chlorhexidine gluconate]    Review of Systems   Constitutional: Negative.  Negative for fatigue.   Eyes: Negative.    Respiratory:  Positive for cough and shortness of breath. Negative for chest tightness.    Cardiovascular:  Negative for chest pain, palpitations and leg swelling.   Gastrointestinal:  Negative for diarrhea and nausea.   Endocrine: Negative.    Genitourinary: Negative.    Musculoskeletal: Negative.    Skin: Negative.    Allergic/Immunologic: Negative.    Neurological:  Negative for dizziness, syncope, light-headedness and headaches.   Hematological: Negative.    Psychiatric/Behavioral: Negative.         Last Recorded Vitals    There were no vitals filed for this visit.         Physical Exam  Constitutional:       Appearance: Normal appearance. She is  obese.   HENT:      Head: Normocephalic.      Nose: Nose normal.      Mouth/Throat:      Mouth: Mucous membranes are moist.   Cardiovascular:      Rate and Rhythm: Normal rate and regular rhythm.   Pulmonary:      Effort: Pulmonary effort is normal.      Breath sounds: Normal breath sounds.   Abdominal:      General: Bowel sounds are normal.      Palpations: Abdomen is soft.   Musculoskeletal:      Cervical back: Normal range of motion.      Right lower leg: No edema.      Left lower leg: No edema.   Skin:     Findings: No rash.   Psychiatric:         Mood and Affect: Mood normal.         Judgment: Judgment normal.            Relevant Results    6MWT (2025)  SP02-  HR-  REUBEN-  Actual Meters-     Echo (2025) pending      6MWT(7/10/2024)  SP02- 98- 93% RA  HR-   REUBEN- 0-3  Actual Meters 408    RHC(3/28/2024)  PAP- 40/18 (27)  PCWP- 13  CO/CI- 2.7/5.2    6MWT (1/15/24)  HR 64 - 133  O2 94 - 86 on RA  Reuben 0 - 4  Meters 465     6MWT (23)  SpO2: 96% - 89% on RA  HR: 74 - 139  Reuben: 0 - 3  Actual 466m     6MWT (2023)  SpO2: 97-88% RA  HR:   Reuben: 0-3  Actual meters: 457    Echo (23)   Left Ventricle: Left ventricular systolic function is normal, with an estimated ejection fraction of 60%. There are no regional wall motion abnormalities. The left ventricular cavity size is normal. Spectral Doppler shows a pseudonormal pattern of left ventricular diastolic filling.  Left Atrium: The left atrium is normal in size. A bubble study using agitated saline was performed. Bubble study is negative.  Right Ventricle: The right ventricle is upper limits of normal in size. There is reduced right ventricular systolic function.  Right Atrium: The right atrium is normal in size.  Aortic Valve: The aortic valve is trileaflet. There is no evidence of aortic valve regurgitation. The peak instantaneous gradient of the aortic valve is 8.0 mmHg. The mean gradient of the aortic valve is 4.0 mmHg.  Mitral  Valve: The mitral valve is normal in structure. There is no evidence of mitral valve regurgitation.  Tricuspid Valve: The tricuspid valve is structurally normal. No evidence of tricuspid regurgitation.  Pulmonic Valve: The pulmonic valve is not well visualized. There is no indication of pulmonic valve regurgitation.  Pericardium: There is no pericardial effusion noted.  Aorta: The aortic root is normal.  Systemic Veins: The inferior vena cava appears to be of normal size. There is IVC inspiratory collapse greater than 50%.  CONCLUSIONS:  1. Left ventricular systolic function is normal with a 60% estimated ejection fraction.  2. Spectral Doppler shows a pseudonormal pattern of left ventricular diastolic filling.  3. There is reduced right ventricular systolic function.     6MWT (4/3/23)  SpO2: 97% - 91% on RA  HR: 72 - 135  Reuben: 0 - 3  Actual 450m     Echo (3/12/22): normal size RV with mildly reduced function, normal size RA     Pulmonary angiogram (2019) with Dr. Veronica - attempted left pulmonary artery angioplasty.  Pulmonary angioplasty (2019) with Dr. Veronica - balloon angio to distal left main, pulmonary artery and segmental branches to the lingula and left lower lobe.     Assessment/Plan     1) Pulmonary   a. CTED limited mostly to right lung - near normal RV - unchanged exercise ability FC II. Known previous desat with significant exercise, prescribed oxygen, will not use. 2015 stress echo with maintained RVSP , TAPSE and RV size at peak exertion. Echo 10/23/2017 near normal to mild RV, likely unchanged. Cath last year 2017 with resting mPAP of 35 mm Hg. 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 Uptravi started, significant side effects because she inappropriately escalated dosing without reviewing or contacting physician office as instructed. we then slowly increase dose and only change after direct consultation with pulmonary vascular office. Also with pulmonary  vein stenosis as part of the same process.      1/10/2022 - taking meds, no edema tolerating 400/400 ug selexipag.      9/12/2022 - stopped taking PH meds because she is in [place]. Still taking anticoagulation. Echo unchanged with muscular RV but normal size, unchanged compared to 2021.     4/3/2023 - Last seen September 2022 with normal echo but had interrupted meds due to insurance issues. She has not worked to clear this up but did not tell us. Today has insurance approval but has not restarted. Will restart. triple therapy sequentially.      9/25/2023 - (+) 9 m , unchanged drop in SpO2 to 89%, using meds, resistant to uptitration of selexipag due to work.     1/15/2024 - Specialty pharmacy claims no delivery since July 2023 for Uptravi. Patient claims she has significant previously delivered drug which she is using, this is possible given dose, etc. Patient unable to uptitrate past 600 BID due to diarrhea, headache. May have to decrease to 600/400 unless sx decrease over next 1-2 weeks. Walk similar but desaturation to 86%, recommend use of ambulatory oxygen.      b. Desaturation due to CTED and dead space- still SpO2 = 91% on room air.      c. 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 This Limits possibilities for PTE. Angio 4/2019 suggested improvement of this flow and patient underwent BPA with significant subjective improvement which has now waned. Unclear if this is due to restenosis or ?     d. Previously INcreasing wheezing and allergy symptoms, patient has had albuterol inhaler for long time but very, very rarely. Now using regular symbicort, no nocturnal  "symptoms.      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      3) Obesity with increased weight, BMI = 35.5-> 36.9->37-.37.6     4) COVID infection, minor dyspnea, no oxygen , stayed home, no CXR feels \"normal\" now.    Plan    1) No change in meds.  2) Continue anticoagulation.   3) Weight loss.   4) No uptitration of selexipag at this time given symptoms. currently at 400/600. Side effects only diarrhea which is well controlled on imodium. No  change in selexipag for 1 month, patient to call then and we will reassess dosing.              "

## 2025-02-12 ENCOUNTER — APPOINTMENT (OUTPATIENT)
Dept: CARDIOLOGY | Facility: HOSPITAL | Age: 40
End: 2025-02-12
Payer: COMMERCIAL

## 2025-02-12 ENCOUNTER — APPOINTMENT (OUTPATIENT)
Dept: PULMONOLOGY | Facility: HOSPITAL | Age: 40
End: 2025-02-12
Payer: COMMERCIAL

## 2025-02-12 ENCOUNTER — APPOINTMENT (OUTPATIENT)
Dept: RESPIRATORY THERAPY | Facility: HOSPITAL | Age: 40
End: 2025-02-12
Payer: COMMERCIAL

## 2025-02-12 DIAGNOSIS — I27.20 PULMONARY HYPERTENSION (MULTI): ICD-10-CM

## 2025-02-12 DIAGNOSIS — Z79.899 LONG-TERM USE OF HIGH-RISK MEDICATION: ICD-10-CM

## 2025-02-18 ENCOUNTER — APPOINTMENT (OUTPATIENT)
Dept: PULMONOLOGY | Facility: HOSPITAL | Age: 40
End: 2025-02-18
Payer: COMMERCIAL

## 2025-02-18 ENCOUNTER — APPOINTMENT (OUTPATIENT)
Dept: RESPIRATORY THERAPY | Facility: HOSPITAL | Age: 40
End: 2025-02-18
Payer: COMMERCIAL

## 2025-02-18 ENCOUNTER — APPOINTMENT (OUTPATIENT)
Dept: CARDIOLOGY | Facility: HOSPITAL | Age: 40
End: 2025-02-18
Payer: COMMERCIAL

## 2025-02-18 DIAGNOSIS — Z79.899 LONG-TERM USE OF HIGH-RISK MEDICATION: ICD-10-CM

## 2025-02-18 DIAGNOSIS — I27.24 CTEPH (CHRONIC THROMBOEMBOLIC PULMONARY HYPERTENSION): Primary | ICD-10-CM

## 2025-02-19 ENCOUNTER — TELEPHONE (OUTPATIENT)
Dept: PULMONOLOGY | Facility: HOSPITAL | Age: 40
End: 2025-02-19
Payer: COMMERCIAL

## 2025-03-04 ASSESSMENT — ENCOUNTER SYMPTOMS
MUSCULOSKELETAL NEGATIVE: 1
FATIGUE: 0
CONSTITUTIONAL NEGATIVE: 1
PSYCHIATRIC NEGATIVE: 1
NAUSEA: 0
COUGH: 1
SHORTNESS OF BREATH: 1
EYES NEGATIVE: 1
LIGHT-HEADEDNESS: 0
DIZZINESS: 0
ENDOCRINE NEGATIVE: 1
ALLERGIC/IMMUNOLOGIC NEGATIVE: 1
DIARRHEA: 0
HEADACHES: 0
HEMATOLOGIC/LYMPHATIC NEGATIVE: 1
CHEST TIGHTNESS: 0
PALPITATIONS: 0

## 2025-03-04 NOTE — PROGRESS NOTES
History Of Present Illness  Alda Chaney is a 40 y.o. female presenting being seen today for CTEPH follow up.  Patient is NYHA Functional Class 1-2 and WHO Group 4 Her last visit was 7/10/2024. She has been with our clinic since 10/2012. Seeing vascular doctor in [place].     PAH Treatment:  Opsumit 10 mg daily (11/30/2019) restarted (8/12/2024)  Uptravi, 600/600 mcg (3/17/2021)  Tadalafil 40 mg daily (10/2013)  Xarelto 20 mg daily  Oxygen 2 LPM HS  Infusion site: N/A  Treatment history:   Revatio (2013) non compliance    Today's testing includes Echo, 6 MWT and labs     Interval History   ED Visits on 10/26/24 for shortness of breath due to viral illness and 2/2/25 for flu like symptoms. Missed medications for 3 days and now retitrating Uptravi.    Patient reports shortness of breath unchanged since last visit. Allergy symptoms starting, using breathing treatments and PO medications. Now working a full time job, 12 hour days as . Has not taken Opsumit for 2 months due to unable to access lab for pregnancy testing. Plan to discuss OBGYN for letter stating she is surgically unable to conceive after endometrial ablation. Re titrated Uptravi after illness. Diarrhea mostly resolved with 1 tab of imodium daily. +1 Edema in right leg, currently taking bactrim for cellulitis.      Past Medical History  Patient Active Problem List   Diagnosis    Asthma    Complicated migraine    Cough with sputum    CTEPH (chronic thromboembolic pulmonary hypertension)    DVT (deep venous thrombosis) (Multi)    Exertional shortness of breath    Limb pain    Myasthenia gravis, acetylcholine receptor antibody positive (Multi)    Obesity    Pulmonary embolism    Pulmonary vein injury    Seasonal allergic reaction    Secondary hypercoagulable state (Multi)    Shoulder pain    URI (upper respiratory infection)    Anal fissure    BRBPR (bright red blood per rectum)    Cardiomegaly    Anxiety disorder    Swelling of right lower extremity     Residual hemorrhoidal skin tags    Rectal pain    Postthrombotic syndrome of right lower extremity    Pain of right lower extremity    Localized edema    Diverticulosis of large intestine    Furuncle    Dependence on supplemental oxygen    Hypertropia of left eye    Hypertropia of right eye    Post-thrombotic syndrome of right lower extremity        Surgical History  She has a past surgical history that includes Eye surgery (08/28/2013); Mouth surgery (08/28/2013); Other surgical history (12/16/2022); Thymectomy (08/31/2017); Other surgical history (02/09/2017); MR angio head w and wo IV contrast (04/16/2014); MR angio chest w IV contrast (01/23/2018); IR CVC tunneled (09/27/2016); MR angio chest w IV contrast (09/28/2012); Cardiac catheterization (N/A, 03/28/2024); Dilation and curettage of uterus (06/21/2024); and Endometrial ablation (06/21/2024).     Social History  She reports that she has never smoked. She has never been exposed to tobacco smoke. She has never used smokeless tobacco. She reports that she does not currently use alcohol. She reports that she does not use drugs.    Family History  Family History   Problem Relation Name Age of Onset    Other (diabetes mellitus) Mother      Hypertension Mother      Cervical cancer Mother      Heart attack Father      Other (diabetes mellitus) Father      Heart disease Father      Other (Adopted) Father          Medications  Current Outpatient Medications:     acetaminophen (TylenoL) 325 mg tablet, Take by mouth. TAKE 1 TO 2 TABLETS EVERY 4 HOURS AS NEEDED, Disp: , Rfl:     albuterol 90 mcg/actuation inhaler, INHALE 1 PUFF BY MOUTH EVERY 4 HOURS AS NEEDED, Disp: 8 g, Rfl: 11    budesonide-formoteroL (Symbicort) 160-4.5 mcg/actuation inhaler, Inhale 2 puffs 2 times a day. Rinse mouth with water after use to reduce aftertaste and incidence of candidiasis. Do not swallow., Disp: 10.2 g, Rfl: 2    fexofenadine (Allegra) 180 mg tablet, Take 1 tablet (180 mg) by mouth  once daily., Disp: 90 tablet, Rfl: 1    fluticasone (Flonase) 50 mcg/actuation nasal spray, Administer 1 spray into each nostril 1 time if needed for allergies., Disp: 16 g, Rfl: 11    ipratropium-albuteroL (Duo-Neb) 0.5-2.5 mg/3 mL nebulizer solution, Take 3 mL by nebulization every 6 hours., Disp: 100 mL, Rfl: 0    montelukast (Singulair) 10 mg tablet, Take 1 tablet (10 mg) by mouth once daily at bedtime., Disp: 90 tablet, Rfl: 3    NON FORMULARY, Take 2 each by mouth once daily. Vein supplement, Disp: , Rfl:     rivaroxaban (Xarelto) 20 mg tablet, Take 1 tablet (20 mg) by mouth once daily., Disp: 90 tablet, Rfl: 3    selexipag (Uptravi) 200 mcg tablet, Take 2 tablets (400 mcg) by mouth once daily. 400 mg in AM and 600 mg in PM, Disp: , Rfl:     tadalafil, antihypertensive, 20 mg tablet, TAKE 2 TABLETS DAILY, Disp: 60 tablet, Rfl: 11    triamcinolone (Kenalog) 0.1 % cream, Apply topically 2 times a day. Apply to affected area 1-2 times daily as needed. Avoid face and groin., Disp: 30 g, Rfl: 0    calcium carbonate-vitamin D3 600 mg-10 mcg (400 unit) tablet, Take 2 tablets by mouth once daily. Takes infrequently (Patient not taking: Reported on 3/18/2025), Disp: , Rfl:     macitentan (Opsumit) 10 mg tablet tablet, Take 1 tablet (10 mg) by mouth once daily. (Patient not taking: Reported on 3/18/2025), Disp: , Rfl:     pyridostigmine (Mestinon) 60 mg tablet, Take 2 tablets (120 mg) by mouth once daily. (Patient not taking: Reported on 3/18/2025), Disp: , Rfl:     sulfamethoxazole-trimethoprim (Bactrim DS) 800-160 mg tablet, Take 1 tablet by mouth 2 times a day for 10 days., Disp: 20 tablet, Rfl: 0     Allergies  Bee venom protein (honey bee), Ciprofloxacin, Levofloxacin, Magnesium, Magnesium chloride, Magnesium sulfate, and Hibiclens [chlorhexidine gluconate]    Review of Systems   Constitutional: Negative.  Negative for fatigue.   Eyes: Negative.    Respiratory:  Positive for cough and shortness of breath. Negative  for chest tightness.    Cardiovascular:  Negative for chest pain, palpitations and leg swelling.   Gastrointestinal:  Negative for diarrhea, nausea and vomiting.   Endocrine: Negative.    Genitourinary: Negative.    Musculoskeletal: Negative.    Skin: Negative.    Allergic/Immunologic: Negative.    Neurological:  Negative for dizziness, syncope, light-headedness and headaches.   Hematological: Negative.    Psychiatric/Behavioral: Negative.         Last Recorded Vitals  Vitals:    03/18/25 1440   BP: 114/76   Pulse: 88   SpO2: 94%        Physical Exam  Constitutional:       Appearance: Normal appearance. She is obese.   HENT:      Head: Normocephalic.      Nose: Nose normal.      Mouth/Throat:      Mouth: Mucous membranes are moist.   Cardiovascular:      Rate and Rhythm: Normal rate and regular rhythm.   Pulmonary:      Effort: Pulmonary effort is normal.      Breath sounds: Normal breath sounds.   Abdominal:      General: Bowel sounds are normal.      Palpations: Abdomen is soft.   Musculoskeletal:      Cervical back: Normal range of motion.      Right lower leg: No edema.      Left lower leg: No edema.   Skin:     Findings: No rash.   Psychiatric:         Mood and Affect: Mood normal.         Judgment: Judgment normal.       Relevant Results    Echo (3/18/2025) pending  Likely no change or trivial improvement by my eye.      6MWT (3/18/2025)  SP02- 94%-89% on RA  HR-   DANIS- 0-5  Actual Meters- 459m    Echo (7/10/2024)  Left Ventricle: Left ventricular ejection fraction is normal, by visual estimate at 55-60%. There are no regional wall motion abnormalities. The left ventricular cavity size is normal. Spectral Doppler shows a normal pattern of left ventricular diastolic filling.  Left Atrium: The left atrium is normal in size.  Right Ventricle: The right ventricle is upper limits of normal in size. There is low normal right ventricular systolic function.  Right Atrium: The right atrium is mildly  dilated.  Aortic Valve: The aortic valve is trileaflet. There is no evidence of aortic valve regurgitation. The peak instantaneous gradient of the aortic valve is 4.4 mmHg.  Mitral Valve: The mitral valve is normal in structure. There is no evidence of mitral valve regurgitation.  Tricuspid Valve: The tricuspid valve is structurally normal. There is trace tricuspid regurgitation. The right ventricular systolic pressure is unable to be estimated.  Pulmonic Valve: The pulmonic valve is structurally normal. There is physiologic pulmonic valve regurgitation.  Pericardium: There is no pericardial effusion noted.  Aorta: The aortic root is normal.  Systemic Veins: The inferior vena cava size appears small. There is IVC inspiratory collapse greater than 50%.  In comparison to the previous echocardiogram(s): Compared with study dated 2022,.     CONCLUSIONS:   1. Poorly visualized anatomical structures due to suboptimal image quality.   2. Left ventricular ejection fraction is normal, by visual estimate at 55-60%.   3. There is low normal right ventricular systolic function.    6MWT (7/10/2024)  SP02- 98- 93% RA  HR-   REUBEN- 0-3  Actual Meters 408     RHC (3/28/2024)  PAP- 40/18 (27)  PCWP- 13  CO/CI- 2.7/5.2     6MWT (1/15/2024)  HR 64 - 133  O2 94 - 86 on RA  Reuben 0 - 4  Meters 465     6MWT (2023)  SpO2: 96% - 89% on RA  HR: 74 - 139  Reuben: 0 - 3  Actual 466m     6MWT (2023)  SpO2: 97-88% RA  HR:   Reuben: 0-3  Actual meters: 457    Echo (2023)   Left Ventricle: Left ventricular systolic function is normal, with an estimated ejection fraction of 60%. There are no regional wall motion abnormalities. The left ventricular cavity size is normal. Spectral Doppler shows a pseudonormal pattern of left ventricular diastolic filling.  Left Atrium: The left atrium is normal in size. A bubble study using agitated saline was performed. Bubble study is negative.  Right Ventricle: The right ventricle is  upper limits of normal in size. There is reduced right ventricular systolic function.  Right Atrium: The right atrium is normal in size.  Aortic Valve: The aortic valve is trileaflet. There is no evidence of aortic valve regurgitation. The peak instantaneous gradient of the aortic valve is 8.0 mmHg. The mean gradient of the aortic valve is 4.0 mmHg.  Mitral Valve: The mitral valve is normal in structure. There is no evidence of mitral valve regurgitation.  Tricuspid Valve: The tricuspid valve is structurally normal. No evidence of tricuspid regurgitation.  Pulmonic Valve: The pulmonic valve is not well visualized. There is no indication of pulmonic valve regurgitation.  Pericardium: There is no pericardial effusion noted.  Aorta: The aortic root is normal.  Systemic Veins: The inferior vena cava appears to be of normal size. There is IVC inspiratory collapse greater than 50%.  CONCLUSIONS:  1. Left ventricular systolic function is normal with a 60% estimated ejection fraction.  2. Spectral Doppler shows a pseudonormal pattern of left ventricular diastolic filling.  3. There is reduced right ventricular systolic function.     6MWT (4/3/2023)  SpO2: 97% - 91% on RA  HR: 72 - 135  Reuben: 0 - 3  Actual 450m     Echo (3/12/2022)   Normal size RV with mildly reduced function, normal size RA     Pulmonary angiogram (2019)   with Dr. Veronica - attempted left pulmonary artery angioplasty.    Pulmonary angioplasty (2019)   with Dr. Veronica - balloon angio to distal left main, pulmonary artery and segmental branches to the lingula and left lower lobe.    Assessment/Plan     1) Pulmonary   a. CTED limited mostly to right lung - near normal RV - unchanged exercise ability FC II. Known previous desat with significant exercise, prescribed oxygen, will not use. 2015 stress echo with maintained RVSP , TAPSE and RV size at peak exertion. Echo 10/23/2017 near normal to mild RV, likely unchanged. Cath last year 2017  with resting mPAP of 35 mm Hg. 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 Uptravi started, significant side effects because she inappropriately escalated dosing without reviewing or contacting physician office as instructed. we then slowly increase dose and only change after direct consultation with pulmonary vascular office. Also with pulmonary  vein stenosis as part of the same process.      1/10/2022 - taking meds, no edema tolerating 400/400 ug selexipag.      9/12/2022 - stopped taking PH meds because she is in [place]. Still taking anticoagulation. Echo unchanged with muscular RV but normal size, unchanged compared to 2021.     4/3/2023 - Last seen September 2022 with normal echo but had interrupted meds due to insurance issues. She has not worked to clear this up but did not tell us. Today has insurance approval but has not restarted. Will restart. triple therapy sequentially.      9/25/2023 - (+) 9 m , unchanged drop in SpO2 to 89%, using meds, resistant to uptitration of selexipag due to work.     1/15/2024 - Specialty pharmacy claims no delivery since July 2023 for Uptravi. Patient claims she has significant previously delivered drug which she is using, this is possible given dose, etc. Patient unable to uptitrate past 600 BID due to diarrhea, headache. May have to decrease to 600/400 unless sx decrease over next 1-2 weeks. Walk similar but desaturation to 86%, recommend use of ambulatory oxygen.     3/18/2025 - feels better, echo stable to trivially improved by my eye. No edema. Willing to use oxygen. Desaturated now and several months ago. 86 and 89%     b. Desaturation due to CTED     c. 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 This Limits possibilities for PTE. Angio 4/2019 suggested improvement of this flow and patient underwent BPA with significant subjective improvement which has now waned. Unclear if this is due to restenosis or ?     d. Previously INcreasing wheezing and allergy symptoms, patient has had albuterol inhaler for long time but very, very rarely. Now using regular symbicort, no nocturnal symptoms.      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      3) Obesity with increased weight, BMI = 35.5-> 36.9->37-.37.6     4) COVID infection, minor dyspnea, no oxygen , stayed home, no CXR feels \"normal\" now.    Plan    1) Continue slow uptitration of selexipag using 200 micrograms added sequentially to AM and PM dose. Current [facility] records show last shipment was July 2023.  2) Continue anticoagulation.   3) Weight loss.     "

## 2025-03-12 ENCOUNTER — OFFICE VISIT (OUTPATIENT)
Dept: URGENT CARE | Facility: CLINIC | Age: 40
End: 2025-03-12
Payer: COMMERCIAL

## 2025-03-12 VITALS
RESPIRATION RATE: 16 BRPM | HEART RATE: 79 BPM | BODY MASS INDEX: 37.56 KG/M2 | DIASTOLIC BLOOD PRESSURE: 79 MMHG | HEIGHT: 63 IN | TEMPERATURE: 97.9 F | WEIGHT: 212 LBS | SYSTOLIC BLOOD PRESSURE: 124 MMHG | OXYGEN SATURATION: 95 %

## 2025-03-12 DIAGNOSIS — L03.115 CELLULITIS OF RIGHT LOWER EXTREMITY: Primary | ICD-10-CM

## 2025-03-12 PROCEDURE — 99213 OFFICE O/P EST LOW 20 MIN: CPT | Performed by: NURSE PRACTITIONER

## 2025-03-12 RX ORDER — SULFAMETHOXAZOLE AND TRIMETHOPRIM 800; 160 MG/1; MG/1
1 TABLET ORAL 2 TIMES DAILY
Qty: 20 TABLET | Refills: 0 | Status: SHIPPED | OUTPATIENT
Start: 2025-03-12 | End: 2025-03-22

## 2025-03-12 NOTE — PROGRESS NOTES
[facility] URGENT CARE   [place] NOTE:      Name: Alda Chaney, 40 y.o.    CSN:[id number]   MRN:[id number]    PCP: Milagros Norman    ALL:    Allergies   Allergen Reactions    Bee Venom Protein (Honey Bee) Anaphylaxis    Ciprofloxacin Unknown    Levofloxacin Other     contraindicated with myesthenia gravis    unk    Magnesium Anaphylaxis    Magnesium Chloride Unknown    Magnesium Sulfate Unknown     patient notified that elevated Mg level could result in respiratory distress.  not immunologically allergic to magnesium.    Hibiclens [Chlorhexidine Gluconate] Rash       History:    Chief Complaint: Rash (Rash on her right leg for about 1 week with a painful sensation associated with it.)    Encounter Date: 3/12/2025      HPI: The history was obtained from the patient. Alda is a 40 y.o. female, who presents with a chief complaint of Rash (Rash on her right leg for about 1 week with a painful sensation associated with it.) rash to the right lower extremity that began approximately 1 week ago with new onset redness and warmth that began yesterday.  History of eczema.  Recently rehabilitation for lymphedema which has improved.  Denies any fevers, chills, body aches    PMHx:    Past Medical History:   Diagnosis Date    Asthma     CTEPH (chronic thromboembolic pulmonary hypertension)     History of DVT (deep vein thrombosis) 02/15/2012    Hyperopia of both eyes 12/30/2021    Myasthenia gravis, acetylcholine receptor antibody positive (Multi)     Other pulmonary embolism without acute cor pulmonale 04/07/2020    Pulmonary embolism    Personal history of DVT (deep vein thrombosis)     Posterior vitreous detachment of both eyes 09/17/2019    Pulmonary embolism and infarction (Multi) 02/15/2012    Vitreous floater, bilateral 09/17/2019              Current Outpatient Medications   Medication Sig Dispense Refill    acetaminophen (TylenoL) 325 mg tablet Take by mouth. TAKE 1 TO 2 TABLETS EVERY 4 HOURS AS  NEEDED      albuterol 90 mcg/actuation inhaler INHALE 1 PUFF BY MOUTH EVERY 4 HOURS AS NEEDED 8 g 11    budesonide-formoteroL (Symbicort) 160-4.5 mcg/actuation inhaler Inhale 2 puffs 2 times a day. Rinse mouth with water after use to reduce aftertaste and incidence of candidiasis. Do not swallow. 10.2 g 2    calcium carbonate-vitamin D3 600 mg-10 mcg (400 unit) tablet Take 2 tablets by mouth once daily. Takes infrequently      fexofenadine (Allegra) 180 mg tablet Take 1 tablet (180 mg) by mouth once daily. 90 tablet 1    fluticasone (Flonase) 50 mcg/actuation nasal spray Administer 1 spray into each nostril 1 time if needed for allergies. 16 g 11    ipratropium-albuteroL (Duo-Neb) 0.5-2.5 mg/3 mL nebulizer solution Take 3 mL by nebulization every 6 hours. 100 mL 0    macitentan (Opsumit) 10 mg tablet tablet Take 1 tablet (10 mg) by mouth once daily.      montelukast (Singulair) 10 mg tablet Take 1 tablet (10 mg) by mouth once daily at bedtime. 90 tablet 3    NON FORMULARY Take 2 each by mouth once daily. Vein supplement      pyridostigmine (Mestinon) 60 mg tablet Take 2 tablets (120 mg) by mouth once daily.      rivaroxaban (Xarelto) 20 mg tablet Take 1 tablet (20 mg) by mouth once daily. 90 tablet 3    selexipag (Uptravi) 200 mcg tablet Take 2 tablets (400 mcg) by mouth once daily. 400 mg in AM and 600 mg in PM      sulfamethoxazole-trimethoprim (Bactrim DS) 800-160 mg tablet Take 1 tablet by mouth 2 times a day for 10 days. 20 tablet 0    tadalafil, antihypertensive, 20 mg tablet TAKE 2 TABLETS DAILY 60 tablet 11    triamcinolone (Kenalog) 0.1 % cream Apply topically 2 times a day. Apply to affected area 1-2 times daily as needed. Avoid face and groin. (Patient not taking: Reported on 11/7/2024) 30 g 0     No current facility-administered medications for this visit.         PMSx:    Past Surgical History:   Procedure Laterality Date    CARDIAC CATHETERIZATION N/A 03/28/2024    Procedure: Right Heart Cath;   Surgeon: Santiago Dimas MD;  Location: [place] [id number] Cardiac Cath Lab;  Service: Cardiovascular;  Laterality: N/A;    DILATION AND CURETTAGE OF UTERUS  06/21/2024    ENDOMETRIAL ABLATION  06/21/2024    EYE SURGERY  08/28/2013    Eye Surgery    IR CVC TUNNELED  09/27/2016    IR CVC TUNNELED 9/27/2016 [facility] [place]    MOUTH SURGERY  08/28/2013    Oral Surgery Tooth Extraction    MR CHEST ANGIO W IV CONTRAST  01/23/2018    MR CHEST ANGIO W IV CONTRAST [place]23/2018 [facility] [place]    MR CHEST ANGIO W IV CONTRAST  09/28/2012    MR CHEST ANGIO W IV CONTRAST 9/28/2012 [facility] [place] [place]    MR HEAD ANGIO W AND WO IV CONTRAST  04/16/2014    MR HEAD ANGIO W AND WO IV CONTRAST 4/16/2014 [facility]    OTHER SURGICAL HISTORY  12/16/2022    Cardiac catheterization    OTHER SURGICAL HISTORY  02/09/2017    Sternotomy    THYMECTOMY  08/31/2017    Thymectomy       Fam Hx:   Family History   Problem Relation Name Age of Onset    Other (diabetes mellitus) Mother      Hypertension Mother      Cervical cancer Mother      Heart attack Father      Other (diabetes mellitus) Father      Heart disease Father      Other (Adopted) Father         SOC. Hx:     Social History     Socioeconomic History    Marital status:      Spouse name: Not on file    Number of children: Not on file    Years of education: Not on file    Highest education level: Not on file   Occupational History    Not on file   Tobacco Use    Smoking status: Never     Passive exposure: Never    Smokeless tobacco: Never   Vaping Use    Vaping status: Never Used   Substance and Sexual Activity    Alcohol use: Not Currently     Comment: Rarely    Drug use: Never    Sexual activity: Defer   Other Topics Concern    Not on file   Social History Narrative    Not on file     Social Drivers of Health     Financial Resource Strain: Not on file   Food Insecurity: Not on file   Transportation Needs: Not on file   Physical Activity: Not on file   Stress: Not  on file   Social Connections: Not on file   Intimate Partner Violence: Not on file   Housing Stability: Not on file         Vitals:    03/12/25 1817   BP: 124/79   Pulse: 79   Resp: 16   Temp: 36.6 °C (97.9 °F)   SpO2: 95%     96.2 kg (212 lb)          Physical Exam  Vitals and nursing note reviewed.   Constitutional:       Appearance: Normal appearance.   HENT:      Head: Normocephalic and atraumatic.      Mouth/Throat:      Mouth: Mucous membranes are moist.      Pharynx: Oropharynx is clear.   Eyes:      Pupils: Pupils are equal, round, and reactive to light.   Cardiovascular:      Rate and Rhythm: Normal rate and regular rhythm.      Pulses: Normal pulses.      Heart sounds: Normal heart sounds.   Pulmonary:      Effort: Pulmonary effort is normal.      Breath sounds: Normal breath sounds.   Abdominal:      General: Abdomen is flat. Bowel sounds are normal.      Palpations: Abdomen is soft.   Musculoskeletal:         General: Normal range of motion.      Cervical back: Normal range of motion.   Skin:     General: Skin is warm and dry.      Capillary Refill: Capillary refill takes less than 2 seconds.          Neurological:      Mental Status: She is alert and oriented to person, place, and time.       ____________________________________________________________________    I did personally review Alda's past medical history, surgical history, social history, as well as family history (when relevant).  In this case, I also oversaw the her drug management by reviewing her medication list, allergy list, as well as the medications that I prescribed during the UC course and/or recommended as an out-patient (including possible OTC medications such as acetaminophen, NSAIDs , etc).    After reviewing the items above, I did look at previous medical documentation, such as recent hospitalizations, office visits, and/or recent consultations with PCP/specialist.                          SDOH:   Another factor that I  considered in Alda's care was her Social Determinants of Health (SDOH). During this  encounter, she did not have social determinants of health. Those SDOH influencing Alda's care are: none      _____________________________________________________________________       COURSE/MEDICAL DECISION MAKING:    Alda is a 40 y.o., who presents with a working diagnosis of   1. Cellulitis of right lower extremity        Alda was seen today for rash.  Diagnoses and all orders for this visit:  Cellulitis of right lower extremity (Primary)  -     sulfamethoxazole-trimethoprim (Bactrim DS) 800-160 mg tablet; Take 1 tablet by mouth 2 times a day for 10 days.         Plan of care reviewed with patient.  If symptoms change and/or worsen will follow-up with primary care provider, return to urgent care, or go to the nearest emergency department for further evaluation.  Patient states understanding and is agreeable with plan of care.      TUAN Hope, DNP   Advanced Practice Provider  [facility] URGENT CARE    Please note: While the patient may or may not have received printed discharge paperwork, all relevant medical findings, test results, and treatment details are accessible through the electronic medical record system. The patient is encouraged to review their chart via the patient portal for comprehensive information and follow-up instructions.

## 2025-03-14 ENCOUNTER — TELEPHONE (OUTPATIENT)
Dept: PULMONOLOGY | Facility: HOSPITAL | Age: 40
End: 2025-03-14
Payer: COMMERCIAL

## 2025-03-18 ENCOUNTER — APPOINTMENT (OUTPATIENT)
Dept: RESPIRATORY THERAPY | Facility: HOSPITAL | Age: 40
End: 2025-03-18
Payer: COMMERCIAL

## 2025-03-18 ENCOUNTER — HOSPITAL ENCOUNTER (OUTPATIENT)
Dept: CARDIOLOGY | Facility: HOSPITAL | Age: 40
Discharge: HOME | End: 2025-03-18
Payer: COMMERCIAL

## 2025-03-18 ENCOUNTER — OFFICE VISIT (OUTPATIENT)
Dept: PULMONOLOGY | Facility: HOSPITAL | Age: 40
End: 2025-03-18
Payer: COMMERCIAL

## 2025-03-18 ENCOUNTER — HOSPITAL ENCOUNTER (OUTPATIENT)
Dept: RESPIRATORY THERAPY | Facility: HOSPITAL | Age: 40
Discharge: HOME | End: 2025-03-18
Payer: COMMERCIAL

## 2025-03-18 VITALS
HEART RATE: 88 BPM | DIASTOLIC BLOOD PRESSURE: 76 MMHG | OXYGEN SATURATION: 94 % | SYSTOLIC BLOOD PRESSURE: 114 MMHG | WEIGHT: 198.8 LBS | BODY MASS INDEX: 35.22 KG/M2

## 2025-03-18 DIAGNOSIS — Z79.899 LONG-TERM USE OF HIGH-RISK MEDICATION: ICD-10-CM

## 2025-03-18 DIAGNOSIS — I27.24 CTEPH (CHRONIC THROMBOEMBOLIC PULMONARY HYPERTENSION): ICD-10-CM

## 2025-03-18 DIAGNOSIS — I27.20 PULMONARY HYPERTENSION (MULTI): ICD-10-CM

## 2025-03-18 DIAGNOSIS — R06.02 SOB (SHORTNESS OF BREATH): ICD-10-CM

## 2025-03-18 PROCEDURE — 94618 PULMONARY STRESS TESTING: CPT

## 2025-03-18 PROCEDURE — 99215 OFFICE O/P EST HI 40 MIN: CPT | Performed by: INTERNAL MEDICINE

## 2025-03-18 PROCEDURE — 93306 TTE W/DOPPLER COMPLETE: CPT | Performed by: INTERNAL MEDICINE

## 2025-03-18 PROCEDURE — 99215 OFFICE O/P EST HI 40 MIN: CPT | Mod: 25 | Performed by: INTERNAL MEDICINE

## 2025-03-18 PROCEDURE — 93306 TTE W/DOPPLER COMPLETE: CPT

## 2025-03-18 ASSESSMENT — ENCOUNTER SYMPTOMS
DEPRESSION: 0
VOMITING: 0
OCCASIONAL FEELINGS OF UNSTEADINESS: 0
LOSS OF SENSATION IN FEET: 0

## 2025-03-19 ENCOUNTER — DOCUMENTATION (OUTPATIENT)
Dept: PULMONOLOGY | Facility: HOSPITAL | Age: 40
End: 2025-03-19
Payer: COMMERCIAL

## 2025-03-19 DIAGNOSIS — Z79.899 LONG-TERM USE OF HIGH-RISK MEDICATION: ICD-10-CM

## 2025-03-19 DIAGNOSIS — R06.02 SOB (SHORTNESS OF BREATH): ICD-10-CM

## 2025-03-19 DIAGNOSIS — I27.20 PULMONARY HYPERTENSION (MULTI): ICD-10-CM

## 2025-03-19 DIAGNOSIS — R19.7 NAUSEA VOMITING AND DIARRHEA: ICD-10-CM

## 2025-03-19 DIAGNOSIS — I27.24 CTEPH (CHRONIC THROMBOEMBOLIC PULMONARY HYPERTENSION): ICD-10-CM

## 2025-03-19 DIAGNOSIS — R11.2 NAUSEA VOMITING AND DIARRHEA: ICD-10-CM

## 2025-03-19 DIAGNOSIS — J20.9 ACUTE BRONCHITIS, UNSPECIFIED ORGANISM: ICD-10-CM

## 2025-03-19 LAB
AORTIC VALVE MEAN GRADIENT: 3 MMHG
AORTIC VALVE PEAK VELOCITY: 1.25 M/S
AV PEAK GRADIENT: 6 MMHG
AVA (PEAK VEL): 2.95 CM2
AVA (VTI): 2.98 CM2
EJECTION FRACTION APICAL 4 CHAMBER: 57.2
EJECTION FRACTION: 61 %
LEFT ATRIUM VOLUME AREA LENGTH INDEX BSA: 15 ML/M2
LEFT VENTRICLE INTERNAL DIMENSION DIASTOLE: 4.6 CM (ref 3.5–6)
LEFT VENTRICULAR OUTFLOW TRACT DIAMETER: 2.4 CM
MITRAL VALVE E/A RATIO: 0.63
RIGHT VENTRICLE FREE WALL PEAK S': 9.95 CM/S
TRICUSPID ANNULAR PLANE SYSTOLIC EXCURSION: 1.5 CM

## 2025-03-19 NOTE — PROGRESS NOTES
Per [facility]:    Uptravi 600 MCG tablets    Case ID: [id number]    Approved from: 02/16/2025 - 03/18/2026    Addendum: Per [employer], approval covers all doses    Tadalafil (PAH) 20MG tablets    Case ID: [id number]    Approved from: 02/16/2025 - 03/18/2026

## 2025-03-20 RX ORDER — IPRATROPIUM BROMIDE AND ALBUTEROL SULFATE 2.5; .5 MG/3ML; MG/3ML
3 SOLUTION RESPIRATORY (INHALATION)
Qty: 360 ML | Refills: 3 | Status: SHIPPED | OUTPATIENT
Start: 2025-03-20 | End: 2025-07-18

## 2025-03-21 ENCOUNTER — HOSPITAL ENCOUNTER (EMERGENCY)
Facility: HOSPITAL | Age: 40
Discharge: HOME | End: 2025-03-21
Attending: EMERGENCY MEDICINE
Payer: COMMERCIAL

## 2025-03-21 VITALS
WEIGHT: 198 LBS | RESPIRATION RATE: 17 BRPM | TEMPERATURE: 97.9 F | SYSTOLIC BLOOD PRESSURE: 116 MMHG | HEIGHT: 63 IN | HEART RATE: 79 BPM | OXYGEN SATURATION: 96 % | DIASTOLIC BLOOD PRESSURE: 81 MMHG | BODY MASS INDEX: 35.08 KG/M2

## 2025-03-21 DIAGNOSIS — R21 RASH: ICD-10-CM

## 2025-03-21 DIAGNOSIS — L03.115 CELLULITIS OF RIGHT LOWER EXTREMITY: Primary | ICD-10-CM

## 2025-03-21 PROCEDURE — 99283 EMERGENCY DEPT VISIT LOW MDM: CPT | Performed by: EMERGENCY MEDICINE

## 2025-03-21 RX ORDER — MUPIROCIN 20 MG/G
OINTMENT TOPICAL
Qty: 15 G | Refills: 1 | Status: SHIPPED | OUTPATIENT
Start: 2025-03-21 | End: 2025-03-31

## 2025-03-21 RX ORDER — CEPHALEXIN 500 MG/1
500 CAPSULE ORAL 4 TIMES DAILY
Qty: 40 CAPSULE | Refills: 0 | Status: SHIPPED | OUTPATIENT
Start: 2025-03-21 | End: 2025-03-31

## 2025-03-21 ASSESSMENT — PAIN DESCRIPTION - ORIENTATION: ORIENTATION: RIGHT

## 2025-03-21 ASSESSMENT — PAIN - FUNCTIONAL ASSESSMENT: PAIN_FUNCTIONAL_ASSESSMENT: 0-10

## 2025-03-21 ASSESSMENT — PAIN DESCRIPTION - PAIN TYPE: TYPE: ACUTE PAIN

## 2025-03-21 ASSESSMENT — COLUMBIA-SUICIDE SEVERITY RATING SCALE - C-SSRS
1. IN THE PAST MONTH, HAVE YOU WISHED YOU WERE DEAD OR WISHED YOU COULD GO TO SLEEP AND NOT WAKE UP?: NO
6. HAVE YOU EVER DONE ANYTHING, STARTED TO DO ANYTHING, OR PREPARED TO DO ANYTHING TO END YOUR LIFE?: NO
2. HAVE YOU ACTUALLY HAD ANY THOUGHTS OF KILLING YOURSELF?: NO

## 2025-03-21 ASSESSMENT — PAIN DESCRIPTION - LOCATION: LOCATION: LEG

## 2025-03-21 ASSESSMENT — VISUAL ACUITY: OU: 1

## 2025-03-21 ASSESSMENT — PAIN SCALES - GENERAL: PAINLEVEL_OUTOF10: 7

## 2025-03-21 ASSESSMENT — PAIN DESCRIPTION - DESCRIPTORS: DESCRIPTORS: BURNING

## 2025-03-21 NOTE — ED PROVIDER NOTES
Concerned about infection.  This 40-year-old white female presents to the ED with complaint of rash located in the posterior aspect of her right calf area she states that she was initially diagnosed with cellulitis and started on Bactrim DS on 3/12/2025.  She states that she was told not to put anything on the rash but the rash really has not gotten much better.  She had attempted to schedule follow-up appoint with her primary care doctor and found out that her primary care doctor no longer practices in this area is attempting to get a new primary care doctor at this point in time.  She denies a history of systemic symptoms such as fevers or chills.      History provided by:  Patient   used: No         Physical Exam  Vitals and nursing note reviewed.   Constitutional:       Appearance: Normal appearance. She is obese.   HENT:      Head: Normocephalic and atraumatic.      Right Ear: Hearing and external ear normal.      Left Ear: Hearing and external ear normal.      Nose: Nose normal. No congestion or rhinorrhea.      Mouth/Throat:      Lips: Pink.      Mouth: Mucous membranes are moist.      Pharynx: Oropharynx is clear. Uvula midline. No oropharyngeal exudate or posterior oropharyngeal erythema.   Eyes:      General: Lids are normal. Vision grossly intact.         Right eye: No discharge.         Left eye: No discharge.      Extraocular Movements: Extraocular movements intact.      Conjunctiva/sclera: Conjunctivae normal.      Pupils: Pupils are equal, round, and reactive to light.   Cardiovascular:      Rate and Rhythm: Normal rate and regular rhythm.      Pulses: Normal pulses.      Heart sounds: Normal heart sounds. No murmur heard.     No friction rub. No gallop.   Pulmonary:      Effort: Pulmonary effort is normal. No respiratory distress.      Breath sounds: Normal breath sounds. No stridor. No wheezing, rhonchi or rales.   Chest:      Chest wall: No tenderness.   Abdominal:      General:  Abdomen is flat. Bowel sounds are normal. There is no distension.      Palpations: Abdomen is soft. There is no mass.      Tenderness: There is no abdominal tenderness. There is no guarding or rebound.      Hernia: No hernia is present.      Comments: Patient had benign abdominal exam.   Musculoskeletal:         General: No swelling, tenderness, deformity or signs of injury. Normal range of motion.      Cervical back: Full passive range of motion without pain, normal range of motion and neck supple.      Right lower leg: No edema.      Left lower leg: No edema.   Skin:     General: Skin is warm and dry.      Capillary Refill: Capillary refill takes less than 2 seconds.      Coloration: Skin is not jaundiced or pale.      Findings: Rash present. No bruising, erythema or lesion.      Comments: Ration of the patient's right lower extremity for distal pulses are +2/4 and present at the dorsalis pedis and tibialis.  Cap refill less than 2 seconds.  There is a large circular rash around 7 cm in diameter most consistent with eczema there is some mild erythema but no streaks of erythema from this and with respect to infection does not appear infected currently.   Neurological:      General: No focal deficit present.      Mental Status: She is alert and oriented to person, place, and time.      Cranial Nerves: No cranial nerve deficit.      Sensory: No sensory deficit.      Motor: No weakness.      Coordination: Coordination normal.      Deep Tendon Reflexes: Reflexes normal.   Psychiatric:         Mood and Affect: Mood normal.         Behavior: Behavior normal. Behavior is cooperative.         Thought Content: Thought content normal.         Judgment: Judgment normal.          Labs Reviewed - No data to display     No orders to display        Procedures     Medical Decision Making  I told the patient that the rash looked like eczema and with respect to the cellulitis component of it I did not feel it was that infected  currently the patient was prescribed Bactroban cream and prescribed Keflex she was referred to a new primary care doctor for follow-up and a dermatologist.  I told the patient that if this rash continues that she may need to see a dermatologist.  The patient was discharged home in stable satisfactory condition.         Diagnoses as of 03/21/25 0944   Cellulitis of right lower extremity   Rash

## 2025-04-02 ENCOUNTER — TELEPHONE (OUTPATIENT)
Dept: PULMONOLOGY | Facility: HOSPITAL | Age: 40
End: 2025-04-02
Payer: COMMERCIAL

## 2025-04-16 ENCOUNTER — APPOINTMENT (OUTPATIENT)
Dept: CARDIOLOGY | Facility: HOSPITAL | Age: 40
End: 2025-04-16
Payer: COMMERCIAL

## 2025-04-16 ENCOUNTER — APPOINTMENT (OUTPATIENT)
Dept: RADIOLOGY | Facility: HOSPITAL | Age: 40
End: 2025-04-16
Payer: COMMERCIAL

## 2025-04-16 ENCOUNTER — HOSPITAL ENCOUNTER (EMERGENCY)
Facility: HOSPITAL | Age: 40
Discharge: HOME | End: 2025-04-16
Attending: EMERGENCY MEDICINE
Payer: COMMERCIAL

## 2025-04-16 VITALS
OXYGEN SATURATION: 93 % | WEIGHT: 200 LBS | TEMPERATURE: 97 F | HEIGHT: 63 IN | DIASTOLIC BLOOD PRESSURE: 72 MMHG | RESPIRATION RATE: 16 BRPM | HEART RATE: 71 BPM | SYSTOLIC BLOOD PRESSURE: 101 MMHG | BODY MASS INDEX: 35.44 KG/M2

## 2025-04-16 DIAGNOSIS — G43.909 MIGRAINE WITHOUT STATUS MIGRAINOSUS, NOT INTRACTABLE, UNSPECIFIED MIGRAINE TYPE: Primary | ICD-10-CM

## 2025-04-16 LAB
ALBUMIN SERPL BCP-MCNC: 4.2 G/DL (ref 3.4–5)
ALP SERPL-CCNC: 86 U/L (ref 33–110)
ALT SERPL W P-5'-P-CCNC: 25 U/L (ref 7–45)
ANION GAP SERPL CALC-SCNC: 8 MMOL/L (ref 10–20)
AST SERPL W P-5'-P-CCNC: 19 U/L (ref 9–39)
BASOPHILS # BLD AUTO: 0.03 X10*3/UL (ref 0–0.1)
BASOPHILS NFR BLD AUTO: 0.5 %
BILIRUB SERPL-MCNC: 0.6 MG/DL (ref 0–1.2)
BUN SERPL-MCNC: 8 MG/DL (ref 6–23)
CALCIUM SERPL-MCNC: 9.1 MG/DL (ref 8.6–10.3)
CHLORIDE SERPL-SCNC: 104 MMOL/L (ref 98–107)
CO2 SERPL-SCNC: 29 MMOL/L (ref 21–32)
CREAT SERPL-MCNC: 0.56 MG/DL (ref 0.5–1.05)
EGFRCR SERPLBLD CKD-EPI 2021: >90 ML/MIN/1.73M*2
EOSINOPHIL # BLD AUTO: 0 X10*3/UL (ref 0–0.7)
EOSINOPHIL NFR BLD AUTO: 0 %
ERYTHROCYTE [DISTWIDTH] IN BLOOD BY AUTOMATED COUNT: 13.6 % (ref 11.5–14.5)
FLUAV RNA RESP QL NAA+PROBE: NOT DETECTED
FLUBV RNA RESP QL NAA+PROBE: NOT DETECTED
GLUCOSE SERPL-MCNC: 113 MG/DL (ref 74–99)
HCT VFR BLD AUTO: 41.7 % (ref 36–46)
HGB BLD-MCNC: 13.2 G/DL (ref 12–16)
IMM GRANULOCYTES # BLD AUTO: 0.02 X10*3/UL (ref 0–0.7)
IMM GRANULOCYTES NFR BLD AUTO: 0.3 % (ref 0–0.9)
LYMPHOCYTES # BLD AUTO: 0.63 X10*3/UL (ref 1.2–4.8)
LYMPHOCYTES NFR BLD AUTO: 10.8 %
MCH RBC QN AUTO: 29.6 PG (ref 26–34)
MCHC RBC AUTO-ENTMCNC: 31.7 G/DL (ref 32–36)
MCV RBC AUTO: 94 FL (ref 80–100)
MONOCYTES # BLD AUTO: 0.25 X10*3/UL (ref 0.1–1)
MONOCYTES NFR BLD AUTO: 4.3 %
NEUTROPHILS # BLD AUTO: 4.88 X10*3/UL (ref 1.2–7.7)
NEUTROPHILS NFR BLD AUTO: 84.1 %
NRBC BLD-RTO: 0 /100 WBCS (ref 0–0)
PLATELET # BLD AUTO: 217 X10*3/UL (ref 150–450)
POTASSIUM SERPL-SCNC: 4.4 MMOL/L (ref 3.5–5.3)
PROT SERPL-MCNC: 7.1 G/DL (ref 6.4–8.2)
RBC # BLD AUTO: 4.46 X10*6/UL (ref 4–5.2)
SARS-COV-2 RNA RESP QL NAA+PROBE: NOT DETECTED
SODIUM SERPL-SCNC: 137 MMOL/L (ref 136–145)
WBC # BLD AUTO: 5.8 X10*3/UL (ref 4.4–11.3)

## 2025-04-16 PROCEDURE — 2500000005 HC RX 250 GENERAL PHARMACY W/O HCPCS: Performed by: EMERGENCY MEDICINE

## 2025-04-16 PROCEDURE — 70450 CT HEAD/BRAIN W/O DYE: CPT | Performed by: STUDENT IN AN ORGANIZED HEALTH CARE EDUCATION/TRAINING PROGRAM

## 2025-04-16 PROCEDURE — 96374 THER/PROPH/DIAG INJ IV PUSH: CPT

## 2025-04-16 PROCEDURE — 94762 N-INVAS EAR/PLS OXIMTRY CONT: CPT

## 2025-04-16 PROCEDURE — 36415 COLL VENOUS BLD VENIPUNCTURE: CPT | Performed by: EMERGENCY MEDICINE

## 2025-04-16 PROCEDURE — 96375 TX/PRO/DX INJ NEW DRUG ADDON: CPT

## 2025-04-16 PROCEDURE — 70450 CT HEAD/BRAIN W/O DYE: CPT

## 2025-04-16 PROCEDURE — 87636 SARSCOV2 & INF A&B AMP PRB: CPT | Performed by: EMERGENCY MEDICINE

## 2025-04-16 PROCEDURE — 85025 COMPLETE CBC W/AUTO DIFF WBC: CPT | Performed by: EMERGENCY MEDICINE

## 2025-04-16 PROCEDURE — 2500000004 HC RX 250 GENERAL PHARMACY W/ HCPCS (ALT 636 FOR OP/ED): Mod: JZ

## 2025-04-16 PROCEDURE — 84075 ASSAY ALKALINE PHOSPHATASE: CPT | Performed by: EMERGENCY MEDICINE

## 2025-04-16 PROCEDURE — 2500000004 HC RX 250 GENERAL PHARMACY W/ HCPCS (ALT 636 FOR OP/ED): Mod: JZ | Performed by: EMERGENCY MEDICINE

## 2025-04-16 PROCEDURE — 96361 HYDRATE IV INFUSION ADD-ON: CPT

## 2025-04-16 PROCEDURE — 71045 X-RAY EXAM CHEST 1 VIEW: CPT

## 2025-04-16 PROCEDURE — 99285 EMERGENCY DEPT VISIT HI MDM: CPT | Mod: 25 | Performed by: EMERGENCY MEDICINE

## 2025-04-16 PROCEDURE — 93005 ELECTROCARDIOGRAM TRACING: CPT

## 2025-04-16 RX ORDER — DIPHENHYDRAMINE HYDROCHLORIDE 50 MG/ML
INJECTION, SOLUTION INTRAMUSCULAR; INTRAVENOUS
Status: COMPLETED
Start: 2025-04-16 | End: 2025-04-16

## 2025-04-16 RX ORDER — DIPHENHYDRAMINE HYDROCHLORIDE 50 MG/ML
25 INJECTION, SOLUTION INTRAMUSCULAR; INTRAVENOUS ONCE
Status: COMPLETED | OUTPATIENT
Start: 2025-04-16 | End: 2025-04-16

## 2025-04-16 RX ORDER — KETOROLAC TROMETHAMINE 30 MG/ML
INJECTION, SOLUTION INTRAMUSCULAR; INTRAVENOUS
Status: COMPLETED
Start: 2025-04-16 | End: 2025-04-16

## 2025-04-16 RX ORDER — KETOROLAC TROMETHAMINE 30 MG/ML
15 INJECTION, SOLUTION INTRAMUSCULAR; INTRAVENOUS ONCE
Status: COMPLETED | OUTPATIENT
Start: 2025-04-16 | End: 2025-04-16

## 2025-04-16 RX ORDER — METOCLOPRAMIDE HYDROCHLORIDE 5 MG/ML
INJECTION INTRAMUSCULAR; INTRAVENOUS
Status: COMPLETED
Start: 2025-04-16 | End: 2025-04-16

## 2025-04-16 RX ORDER — METOCLOPRAMIDE HYDROCHLORIDE 5 MG/ML
10 INJECTION INTRAMUSCULAR; INTRAVENOUS ONCE
Status: COMPLETED | OUTPATIENT
Start: 2025-04-16 | End: 2025-04-16

## 2025-04-16 RX ADMIN — METOCLOPRAMIDE HYDROCHLORIDE 10 MG: 5 INJECTION INTRAMUSCULAR; INTRAVENOUS at 14:28

## 2025-04-16 RX ADMIN — SODIUM CHLORIDE 500 ML: 0.9 INJECTION, SOLUTION INTRAVENOUS at 14:19

## 2025-04-16 RX ADMIN — KETOROLAC TROMETHAMINE 15 MG: 30 INJECTION, SOLUTION INTRAMUSCULAR at 14:24

## 2025-04-16 RX ADMIN — Medication 4 L/MIN: at 13:42

## 2025-04-16 RX ADMIN — KETOROLAC TROMETHAMINE 15 MG: 30 INJECTION, SOLUTION INTRAMUSCULAR; INTRAVENOUS at 14:24

## 2025-04-16 RX ADMIN — DIPHENHYDRAMINE HYDROCHLORIDE 25 MG: 50 INJECTION, SOLUTION INTRAMUSCULAR; INTRAVENOUS at 14:26

## 2025-04-16 RX ADMIN — METOCLOPRAMIDE 10 MG: 5 INJECTION, SOLUTION INTRAMUSCULAR; INTRAVENOUS at 14:28

## 2025-04-16 ASSESSMENT — PAIN DESCRIPTION - LOCATION
LOCATION: HEAD
LOCATION: HEAD

## 2025-04-16 ASSESSMENT — PAIN SCALES - GENERAL
PAINLEVEL_OUTOF10: 1
PAINLEVEL_OUTOF10: 6
PAINLEVEL_OUTOF10: 4
PAINLEVEL_OUTOF10: 5 - MODERATE PAIN

## 2025-04-16 ASSESSMENT — PAIN DESCRIPTION - PAIN TYPE: TYPE: ACUTE PAIN

## 2025-04-16 ASSESSMENT — PAIN - FUNCTIONAL ASSESSMENT: PAIN_FUNCTIONAL_ASSESSMENT: 0-10

## 2025-04-16 NOTE — DISCHARGE INSTRUCTIONS
An arachnoid cyst was seen on the CT of your brain.  This is an incidental finding.  Please follow-up with your primary care physician for surveillance.

## 2025-04-16 NOTE — ED PROVIDER NOTES
HPI   Chief Complaint   Patient presents with    Headache     Pt here for HA that has been going on for 3 days, but she states that today around 4am it became a migraine. Complains of nausea and dizziness. Pt found to be hypoxic at 77% of arrival. Pt states that she normally wears O2, but decided not to grab her portable tank. Hx Pulmonary HTN, Myasthenia Gravis, and removal of Thymus gland. Also complains of productive green cough       Limitations to History: None    HPI: 40-year-old female presents with headache.  Described as a migraine.  History of migraines.  Is currently on Nurtec but has missed some doses secondary to insurance coverage.  States that the pain is in her bilateral parietal regions.  States that she did have some vomiting this morning which is atypical of her migraines.  Patient has also had a productive cough.  History of pulmonary arterial hypertension as well as myasthenia gravis.  Anticoagulated on Xarelto.    ------------------------------------------------------------------------------------------------------------------------------------------  Physical Exam:    VS: As documented in the triage note and EMR flowsheet from this visit were reviewed.    Appearance: Alert. cooperative,  in no acute distress.   Skin: Intact,  dry skin, no lesions, rash, petechiae or purpura.   Eyes: PERRLA, EOMs intact,  Conjunctiva pink with no redness or exudates.   HENT: Normocephalic, atraumatic. Nares patent. No intraoral lesions.   Neck: Supple, without meningismus. Trachea at midline. No lymphadenopathy.  Pulmonary: Clear bilaterally with good chest wall excursion. No rales, rhonchi or wheezing. No accessory muscle use or stridor.  Cardiac: Regular rate and rhythm, no rubs, murmurs, or gallops.   Abdomen: Abdomen is soft, nontender, and nondistended.  No palpable organomegaly.  No rebound or guarding.  No CVA tenderness. Nonsurgical abdomen.  Genitourinary: Exam deferred.  Musculoskeletal: Full range of  motion.  Pulses full and equal. No cyanosis, clubbing, or edema.  Neurological:  Cranial nerves are grossly intact, grossly normal sensation, no weakness, no focal findings identified.  Psychiatric: Appropriate mood and affect.                Patient History   Medical History[1]  Surgical History[2]  Family History[3]  Social History[4]    Physical Exam   ED Triage Vitals   Temperature Heart Rate Respirations BP   04/16/25 1334 04/16/25 1322 04/16/25 1322 04/16/25 1322   36.1 °C (97 °F) 93 20 120/79      Pulse Ox Temp Source Heart Rate Source Patient Position   04/16/25 1322 04/16/25 1334 04/16/25 1322 --   (!) 77 % Oral Monitor       BP Location FiO2 (%)     -- --             Physical Exam      ED Course & MDM   Diagnoses as of 04/16/25 1525   Migraine without status migrainosus, not intractable, unspecified migraine type                 No data recorded                                 Medical Decision Making  Labs Reviewed  CBC WITH AUTO DIFFERENTIAL - Abnormal     WBC                           5.8                    nRBC                          0.0                    RBC                           4.46                   Hemoglobin                    13.2                   Hematocrit                    41.7                   MCV                           94                     MCH                           29.6                   MCHC                          31.7 (*)               RDW                           13.6                   Platelets                     217                    Neutrophils %                 84.1                   Immature Granulocytes %, Automated   0.3                    Lymphocytes %                 10.8                   Monocytes %                   4.3                    Eosinophils %                 0.0                    Basophils %                   0.5                    Neutrophils Absolute          4.88                   Immature Granulocytes Absolute, Au*   0.02                    Lymphocytes Absolute          0.63 (*)               Monocytes Absolute            0.25                   Eosinophils Absolute          0.00                   Basophils Absolute            0.03                COMPREHENSIVE METABOLIC PANEL - Abnormal     Glucose                       113 (*)                Sodium                        137                    Potassium                     4.4                    Chloride                      104                    Bicarbonate                   29                     Anion Gap                     8 (*)                  Urea Nitrogen                 8                      Creatinine                    0.56                   eGFR                          >90                    Calcium                       9.1                    Albumin                       4.2                    Alkaline Phosphatase          86                     Total Protein                 7.1                    AST                           19                     Bilirubin, Total              0.6                    ALT                           25                  SARS-COV-2 PCR - Normal     Coronavirus 2019, PCR                                    Narrative: This assay is an FDA-cleared, in vitro diagnostic nucleic acid amplification test for the qualitative detection and differentiation of SARS CoV-2 from nasopharyngeal specimens collected from individuals with signs and symptoms of respiratory tract infections, and has been validated for use at [facility]. Negative results do not preclude COVID-19 infections and should not be used as the sole basis for diagnosis, treatment, or other management decisions. Testing for SARS CoV-2 is recommended only for patients who meet current clinical and/or epidemiological criteria defined by federal, state, or local public health directives.  INFLUENZA A AND B PCR - Normal  CT head wo IV contrast   Final Result    No acute intracranial  abnormality.          Stable arachnoid cyst in the midline posterior fossa measuring 2 cm x    3.1 cm x 1.6 cm causing mild anterior direct mass effect on the    vermis. No cerebellar tonsil herniation or vasogenic edema.          MACRO:    None.          Signed by: Carlos Grossman 4/16/2025 2:05 PM    Dictation workstation:   [id number]     XR chest 1 view   Final Result    No acute cardiopulmonary process.          Sequela of remote granulomatous disease in the left upper lobe.          MACRO:    None.          Signed by: Carlos Grossman 4/16/2025 2:05 PM    Dictation workstation:   [id number]     Medical Decision Making:    Patient appears well nontoxic.  Initially 77% on room air however wears home oxygen he does not have this with her at this time.  When placed on her home oxygen patient improved between 93 and 96%.  Lab work unremarkable.  CT brain shows no intracranial hemorrhage.  There is evidence of a stable arachnoid cyst.  No evidence of vasogenic edema or tonsillar herniation.  Patient was made aware of this finding will follow-up with primary care for surveillance.  Chest x-ray clear.  Viral swabs negative.  Patient treated with 500 mL normal saline, Toradol, Reglan, Benadryl.  Feeling much improved.  Advised to return for new or worsening symptoms.  Stable at time of discharge.    Differential Diagnoses Considered: Migraine, intracranial mass, intracranial hemorrhage, electrolyte abnormality, viral illness    Independent Interpretation of Studies:  I independently interpreted: Chest x-ray shows no evidence of pneumonia or pneumothorax.  CT brain without intracranial hemorrhage.    Escalation of Care:  Appropriate for discharge and follow-up with primary care.          Procedure  ECG 12 lead    Performed by: Lopez Ha DO  Authorized by: Lopez Ha DO    ECG interpreted by ED Physician in the absence of a cardiologist: yes    Comments:      EKG interpreted by Dr. Marroquin  LeMasters: Normal sinus rhythm at 83 bpm.  WV interval 198 ms.  QTc of 439 ms.  Nonspecific ST changes.         [1]   Past Medical History:  Diagnosis Date    Asthma     CTEPH (chronic thromboembolic pulmonary hypertension)     History of DVT (deep vein thrombosis) 02/15/2012    Hyperopia of both eyes 12/30/2021    Myasthenia gravis, acetylcholine receptor antibody positive (Multi)     Other pulmonary embolism without acute cor pulmonale 04/07/2020    Pulmonary embolism    Personal history of DVT (deep vein thrombosis)     Posterior vitreous detachment of both eyes 09/17/2019    Pulmonary embolism and infarction (Multi) 02/15/2012    Vitreous floater, bilateral 09/17/2019   [2]   Past Surgical History:  Procedure Laterality Date    CARDIAC CATHETERIZATION N/A 03/28/2024    Procedure: Right Heart Cath;  Surgeon: Santiago Dimas MD;  Location: [place] [address] Cardiac Cath Lab;  Service: Cardiovascular;  Laterality: N/A;    DILATION AND CURETTAGE OF UTERUS  06/21/2024    ENDOMETRIAL ABLATION  06/21/2024    EYE SURGERY  08/28/2013    Eye Surgery    IR CVC TUNNELED  09/27/2016    IR CVC TUNNELED 9/27/2016 [facility] [place] INPATIENT LEGACY    MOUTH SURGERY  08/28/2013    Oral Surgery Tooth Extraction    MR CHEST ANGIO W IV CONTRAST  01/23/2018    MR CHEST ANGIO W IV CONTRAST 1/23/2018 [facility] [place] [facility] ANCILLARY LEGACY    MR CHEST ANGIO W IV CONTRAST  09/28/2012    MR CHEST ANGIO W IV CONTRAST 9/28/2012 [facility] [place] [facility] ANCILLARY LEGACY    MR HEAD ANGIO W AND WO IV CONTRAST  04/16/2014    MR HEAD ANGIO W AND WO IV CONTRAST 4/16/2014 [facility]CILL[facility]    OTHER SURGICAL HISTORY  12/16/2022    Cardiac catheterization    OTHER SURGICAL HISTORY  02/09/2017    Sternotomy    THYMECTOMY  08/31/2017    Thymectomy   [3]   Family History  Problem Relation Name Age of Onset    Other (diabetes mellitus) Mother      Hypertension Mother      Cervical cancer Mother      Heart attack Father      Other (diabetes mellitus) Father      Heart disease Father      Other  (Adopted) Father     [4]   Social History  Tobacco Use    Smoking status: Never     Passive exposure: Never    Smokeless tobacco: Never   Vaping Use    Vaping status: Never Used   Substance Use Topics    Alcohol use: Not Currently     Comment: Rarely    Drug use: Never        Lopez Ha DO  04/16/25 1526

## 2025-04-17 LAB
ATRIAL RATE: 83 BPM
P AXIS: 72 DEGREES
P OFFSET: 184 MS
P ONSET: 129 MS
PR INTERVAL: 198 MS
Q ONSET: 228 MS
QRS COUNT: 13 BEATS
QRS DURATION: 88 MS
QT INTERVAL: 374 MS
QTC CALCULATION(BAZETT): 439 MS
QTC FREDERICIA: 417 MS
R AXIS: 66 DEGREES
T AXIS: 55 DEGREES
T OFFSET: 415 MS
VENTRICULAR RATE: 83 BPM

## 2025-04-22 ENCOUNTER — DOCUMENTATION (OUTPATIENT)
Dept: PULMONOLOGY | Facility: HOSPITAL | Age: 40
End: 2025-04-22
Payer: COMMERCIAL

## 2025-05-07 ENCOUNTER — DOCUMENTATION (OUTPATIENT)
Dept: PULMONOLOGY | Facility: HOSPITAL | Age: 40
End: 2025-05-07
Payer: COMMERCIAL

## 2025-05-08 ENCOUNTER — APPOINTMENT (OUTPATIENT)
Dept: PRIMARY CARE | Facility: CLINIC | Age: 40
End: 2025-05-08
Payer: COMMERCIAL

## 2025-05-16 ENCOUNTER — DOCUMENTATION (OUTPATIENT)
Dept: PULMONOLOGY | Facility: HOSPITAL | Age: 40
End: 2025-05-16
Payer: COMMERCIAL

## 2025-05-16 NOTE — PROGRESS NOTES
RN unable to reach patient. Confirmed [facility].  Medication: Uptravi (selexipag)  Goal dose: 1600 BID    Symptoms: headache/diarrhea/flushing/jaw pain/ nausea/vomiting/neuropathy: Unknown      New dose: 600 mcg/800 mcg BID started on unknown     Plan: Patient to uptitrate slowly, increasing evening dose first.

## 2025-05-21 ENCOUNTER — DOCUMENTATION (OUTPATIENT)
Dept: PULMONOLOGY | Facility: HOSPITAL | Age: 40
End: 2025-05-21
Payer: COMMERCIAL

## 2025-05-21 NOTE — PROGRESS NOTES
RN called patient.  Medication: Opsumit    Started: Monday 5/19    Side effects: Sore throat is new, did not have when she was on medication previously.      Patient called office.   Medication: Uptravi (selexipag)  Goal dose: 1600 BID    Symptoms: headache/diarrhea/flushing/jaw pain/ nausea/vomiting/neuropathy: Diarrhea  treated with imodium PRN    Current dose:  600 mcg/800 mcg BID started on unknown     New dose: 800 mcg  BID plan to start on 5/31/2025    Plan: Patient to uptitrate slowly, increasing evening dose first.

## 2025-06-19 DIAGNOSIS — R06.02 SOB (SHORTNESS OF BREATH): ICD-10-CM

## 2025-06-19 DIAGNOSIS — Z79.899 LONG-TERM USE OF HIGH-RISK MEDICATION: ICD-10-CM

## 2025-06-19 DIAGNOSIS — I27.20 PULMONARY HYPERTENSION (MULTI): ICD-10-CM

## 2025-06-27 ENCOUNTER — APPOINTMENT (OUTPATIENT)
Dept: PRIMARY CARE | Facility: CLINIC | Age: 40
End: 2025-06-27
Payer: COMMERCIAL

## 2025-06-27 VITALS
DIASTOLIC BLOOD PRESSURE: 78 MMHG | WEIGHT: 200 LBS | SYSTOLIC BLOOD PRESSURE: 110 MMHG | HEART RATE: 76 BPM | BODY MASS INDEX: 35.44 KG/M2 | HEIGHT: 63 IN

## 2025-06-27 DIAGNOSIS — Z00.00 ROUTINE GENERAL MEDICAL EXAMINATION AT A HEALTH CARE FACILITY: Primary | ICD-10-CM

## 2025-06-27 DIAGNOSIS — E66.01 CLASS 2 SEVERE OBESITY DUE TO EXCESS CALORIES WITH SERIOUS COMORBIDITY AND BODY MASS INDEX (BMI) OF 35.0 TO 35.9 IN ADULT: ICD-10-CM

## 2025-06-27 DIAGNOSIS — I82.511 CHRONIC DEEP VEIN THROMBOSIS (DVT) OF FEMORAL VEIN OF RIGHT LOWER EXTREMITY: ICD-10-CM

## 2025-06-27 DIAGNOSIS — I27.24 CTEPH (CHRONIC THROMBOEMBOLIC PULMONARY HYPERTENSION): ICD-10-CM

## 2025-06-27 DIAGNOSIS — E66.812 CLASS 2 SEVERE OBESITY DUE TO EXCESS CALORIES WITH SERIOUS COMORBIDITY AND BODY MASS INDEX (BMI) OF 35.0 TO 35.9 IN ADULT: ICD-10-CM

## 2025-06-27 DIAGNOSIS — J45.30 MILD PERSISTENT ASTHMA, UNSPECIFIED WHETHER COMPLICATED (HHS-HCC): ICD-10-CM

## 2025-06-27 DIAGNOSIS — J30.2 SEASONAL ALLERGIES: ICD-10-CM

## 2025-06-27 DIAGNOSIS — Z99.81 DEPENDENCE ON SUPPLEMENTAL OXYGEN: ICD-10-CM

## 2025-06-27 DIAGNOSIS — D68.69 SECONDARY HYPERCOAGULABLE STATE (MULTI): ICD-10-CM

## 2025-06-27 DIAGNOSIS — G70.00 MYASTHENIA GRAVIS, ACETYLCHOLINE RECEPTOR ANTIBODY POSITIVE (MULTI): ICD-10-CM

## 2025-06-27 PROCEDURE — 3008F BODY MASS INDEX DOCD: CPT | Performed by: STUDENT IN AN ORGANIZED HEALTH CARE EDUCATION/TRAINING PROGRAM

## 2025-06-27 PROCEDURE — 99396 PREV VISIT EST AGE 40-64: CPT | Performed by: STUDENT IN AN ORGANIZED HEALTH CARE EDUCATION/TRAINING PROGRAM

## 2025-06-27 RX ORDER — MONTELUKAST SODIUM 10 MG/1
10 TABLET ORAL NIGHTLY
Qty: 90 TABLET | Refills: 3 | Status: SHIPPED | OUTPATIENT
Start: 2025-06-27

## 2025-06-27 RX ORDER — FEXOFENADINE HCL 180 MG/1
180 TABLET ORAL DAILY
Qty: 90 TABLET | Refills: 3 | Status: SHIPPED | OUTPATIENT
Start: 2025-06-27

## 2025-06-27 RX ORDER — TRIAMCINOLONE ACETONIDE 1 MG/G
CREAM TOPICAL 2 TIMES DAILY
Qty: 30 G | Refills: 0 | Status: SHIPPED | OUTPATIENT
Start: 2025-06-27 | End: 2025-06-27 | Stop reason: SDUPTHER

## 2025-06-27 RX ORDER — TRIAMCINOLONE ACETONIDE 1 MG/G
CREAM TOPICAL 2 TIMES DAILY
Qty: 30 G | Refills: 0 | Status: SHIPPED | OUTPATIENT
Start: 2025-06-27

## 2025-06-27 RX ORDER — FLUTICASONE PROPIONATE 50 MCG
1 SPRAY, SUSPENSION (ML) NASAL ONCE AS NEEDED
Qty: 16 G | Refills: 11 | Status: SHIPPED | OUTPATIENT
Start: 2025-06-27

## 2025-06-27 ASSESSMENT — PATIENT HEALTH QUESTIONNAIRE - PHQ9
1. LITTLE INTEREST OR PLEASURE IN DOING THINGS: SEVERAL DAYS
2. FEELING DOWN, DEPRESSED OR HOPELESS: NOT AT ALL
SUM OF ALL RESPONSES TO PHQ9 QUESTIONS 1 AND 2: 1

## 2025-07-13 PROBLEM — R05.8 COUGH WITH SPUTUM: Status: RESOLVED | Noted: 2023-03-19 | Resolved: 2025-07-13

## 2025-07-13 PROBLEM — M79.609 LIMB PAIN: Status: RESOLVED | Noted: 2023-03-19 | Resolved: 2025-07-13

## 2025-07-13 PROBLEM — M79.89 SWELLING OF RIGHT LOWER EXTREMITY: Status: RESOLVED | Noted: 2024-01-04 | Resolved: 2025-07-13

## 2025-07-13 PROBLEM — M25.519 SHOULDER PAIN: Status: RESOLVED | Noted: 2023-03-19 | Resolved: 2025-07-13

## 2025-07-13 PROBLEM — S25.409A: Status: RESOLVED | Noted: 2023-03-19 | Resolved: 2025-07-13

## 2025-07-13 PROBLEM — J06.9 URI (UPPER RESPIRATORY INFECTION): Status: RESOLVED | Noted: 2023-03-19 | Resolved: 2025-07-13

## 2025-07-13 PROBLEM — G43.109 COMPLICATED MIGRAINE: Status: RESOLVED | Noted: 2023-03-19 | Resolved: 2025-07-13

## 2025-07-13 PROBLEM — K62.5 BRBPR (BRIGHT RED BLOOD PER RECTUM): Status: RESOLVED | Noted: 2023-03-19 | Resolved: 2025-07-13

## 2025-07-13 PROBLEM — E66.01 CLASS 2 SEVERE OBESITY DUE TO EXCESS CALORIES WITH SERIOUS COMORBIDITY AND BODY MASS INDEX (BMI) OF 35.0 TO 35.9 IN ADULT: Status: ACTIVE | Noted: 2023-03-19

## 2025-07-13 PROBLEM — I87.001 POST-THROMBOTIC SYNDROME OF RIGHT LOWER EXTREMITY: Status: RESOLVED | Noted: 2024-06-26 | Resolved: 2025-07-13

## 2025-07-13 PROBLEM — K62.89 RECTAL PAIN: Status: RESOLVED | Noted: 2023-03-19 | Resolved: 2025-07-13

## 2025-07-13 PROBLEM — I87.001 POSTTHROMBOTIC SYNDROME OF RIGHT LOWER EXTREMITY: Status: RESOLVED | Noted: 2024-01-04 | Resolved: 2025-07-13

## 2025-07-13 PROBLEM — R60.0 LOCALIZED EDEMA: Status: RESOLVED | Noted: 2024-01-04 | Resolved: 2025-07-13

## 2025-07-13 PROBLEM — M79.604 PAIN OF RIGHT LOWER EXTREMITY: Status: RESOLVED | Noted: 2024-01-04 | Resolved: 2025-07-13

## 2025-07-13 PROBLEM — L02.92 FURUNCLE: Status: RESOLVED | Noted: 2024-01-04 | Resolved: 2025-07-13

## 2025-07-13 PROBLEM — K64.4 RESIDUAL HEMORRHOIDAL SKIN TAGS: Status: RESOLVED | Noted: 2023-04-26 | Resolved: 2025-07-13

## 2025-07-13 PROBLEM — R06.02 EXERTIONAL SHORTNESS OF BREATH: Status: RESOLVED | Noted: 2023-03-19 | Resolved: 2025-07-13

## 2025-07-13 PROBLEM — E66.812 CLASS 2 SEVERE OBESITY DUE TO EXCESS CALORIES WITH SERIOUS COMORBIDITY AND BODY MASS INDEX (BMI) OF 35.0 TO 35.9 IN ADULT: Status: ACTIVE | Noted: 2023-03-19

## 2025-07-13 ASSESSMENT — ENCOUNTER SYMPTOMS
NAUSEA: 0
COUGH: 0
DYSPHORIC MOOD: 0
HEADACHES: 0
RHINORRHEA: 0
ABDOMINAL PAIN: 0
NERVOUS/ANXIOUS: 0
EYE REDNESS: 0
SHORTNESS OF BREATH: 1
PALPITATIONS: 0
DYSURIA: 0
FEVER: 0
CHILLS: 0
CONSTIPATION: 0
ARTHRALGIAS: 0
BLOOD IN STOOL: 0
SLEEP DISTURBANCE: 0
DIARRHEA: 0
VOMITING: 0

## 2025-07-14 NOTE — ASSESSMENT & PLAN NOTE
- Managed on Symbicort, Singulair 10mg every day, prn albuterol  - Continue close follow up with pulmonology as previously scheduled.

## 2025-07-14 NOTE — ASSESSMENT & PLAN NOTE
- Managed on Xarelto 20mg every day  - Continue close follow up with vascular as previously scheduled.

## 2025-07-14 NOTE — ASSESSMENT & PLAN NOTE
- Managed on Uptravi, Opsumit, Xarelto  - Continue close follow up with pulmonology and vascular as previously scheduled.

## 2025-07-21 ENCOUNTER — TELEPHONE (OUTPATIENT)
Dept: PULMONOLOGY | Facility: HOSPITAL | Age: 40
End: 2025-07-21

## 2025-07-23 ENCOUNTER — APPOINTMENT (OUTPATIENT)
Dept: CARDIOLOGY | Facility: CLINIC | Age: 40
End: 2025-07-23
Payer: COMMERCIAL

## 2025-09-23 ENCOUNTER — APPOINTMENT (OUTPATIENT)
Dept: RESPIRATORY THERAPY | Facility: HOSPITAL | Age: 40
End: 2025-09-23
Payer: COMMERCIAL

## 2025-09-23 ENCOUNTER — APPOINTMENT (OUTPATIENT)
Dept: PULMONOLOGY | Facility: HOSPITAL | Age: 40
End: 2025-09-23
Payer: COMMERCIAL

## 2026-06-29 ENCOUNTER — APPOINTMENT (OUTPATIENT)
Dept: PRIMARY CARE | Facility: CLINIC | Age: 41
End: 2026-06-29
Payer: COMMERCIAL

## (undated) DEVICE — SHEATH, PINNACLE, 10 CM,  6FR INTRODUCER, 6FR DIA, 2.5 CM DIALATOR

## (undated) DEVICE — CATHETER, WEDGE PRESSURE, BALLOON, DOUBLE LUMEN, 5 FR, 110 CM

## (undated) DEVICE — GLOVE, SURGICAL, PROTEXIS PI , 6.5, PF, LF

## (undated) DEVICE — DRESSING, TRANSPARENT, TEGADERM, 2-3/8 X 2-3/4 IN

## (undated) DEVICE — PROTECTOR, NERVE, ULNAR, PINK

## (undated) DEVICE — DRAPE, TIBURON, SPLIT SHEET, REINF ADH STRIP, 77X122

## (undated) DEVICE — SUTURE, MONOCRYL, 4-0, 27 IN, RB1, UNDYED

## (undated) DEVICE — INTRODUCER SET, MICROPUNCT, STIFF, 4FR 10CM,PLATINUM TIP,NITINOLWIRE

## (undated) DEVICE — INTRODUCER, GLIDESHEATH SLENDER A-KIT, 5FR 10CM

## (undated) DEVICE — DRAPE, SHEET, THYROID, W/ARMBOARD COVER, 100 X 121 IN, DISPOSABLE, LF, STERILE

## (undated) DEVICE — COVER PROBE, SOFT FLEX W/ GEL, 5 X 48 IN (13X122CM)

## (undated) DEVICE — STRAP, CIRCUMFERENTIAL, 2 X 76""

## (undated) DEVICE — Device

## (undated) DEVICE — DRAPE, SHEET, LAPAROTOMY, W/ISO-BAC, W/ARMBOARD COVERS, 98 X 122 IN, DISPOSABLE, LF, STERILE

## (undated) DEVICE — TORQUE DEVICE, ACCOMODATES WIRES W/DIA .010 TO .038"."

## (undated) DEVICE — CATHETER, QUICKCROSS SUPPORT .035 X 90CM

## (undated) DEVICE — GLOVE, SURGICAL, BIOGEL, 6.5, PF, LATEX, GREEN

## (undated) DEVICE — MANIFOLD, 4 PORT NEPTUNE STANDARD

## (undated) DEVICE — STOPCOCK, HIGH PRESSURE, FLO-SWITCH

## (undated) DEVICE — GUIDEWIRE, STIFF SHAFT, ANGLE TIP, .035 DIA, 260 CM,  3 CM TIP"

## (undated) DEVICE — GUIDEWIRE, FIXED CORE, SAFE-T-J, 0.025 X 180CML

## (undated) DEVICE — STOPCOCK, 4 WAY, SMALL BODY, W/SWIVEL, ULTRA, LIPD RESISTANT, LUER LOCK, MALE, LF

## (undated) DEVICE — COVER, CART, 45 X 27 X 48 IN, CLEAR